# Patient Record
Sex: FEMALE | Race: WHITE | HISPANIC OR LATINO | Employment: OTHER | URBAN - METROPOLITAN AREA
[De-identification: names, ages, dates, MRNs, and addresses within clinical notes are randomized per-mention and may not be internally consistent; named-entity substitution may affect disease eponyms.]

---

## 2019-11-11 ENCOUNTER — HOSPITAL ENCOUNTER (OUTPATIENT)
Dept: RADIOLOGY | Facility: MEDICAL CENTER | Age: 34
End: 2019-11-11
Attending: SPECIALIST
Payer: COMMERCIAL

## 2019-11-11 DIAGNOSIS — N94.4 PRIMARY DYSMENORRHEA: ICD-10-CM

## 2019-11-11 DIAGNOSIS — N94.10 DYSPAREUNIA IN FEMALE: ICD-10-CM

## 2019-11-11 DIAGNOSIS — R10.2 PELVIC PAIN IN FEMALE: ICD-10-CM

## 2019-11-11 PROCEDURE — 76830 TRANSVAGINAL US NON-OB: CPT

## 2019-12-03 DIAGNOSIS — Z01.812 PRE-OPERATIVE LABORATORY EXAMINATION: ICD-10-CM

## 2019-12-03 LAB
ANION GAP SERPL CALC-SCNC: 7 MMOL/L (ref 0–11.9)
BUN SERPL-MCNC: 8 MG/DL (ref 8–22)
CALCIUM SERPL-MCNC: 9.6 MG/DL (ref 8.5–10.5)
CHLORIDE SERPL-SCNC: 104 MMOL/L (ref 96–112)
CO2 SERPL-SCNC: 26 MMOL/L (ref 20–33)
CREAT SERPL-MCNC: 0.64 MG/DL (ref 0.5–1.4)
ERYTHROCYTE [DISTWIDTH] IN BLOOD BY AUTOMATED COUNT: 43.8 FL (ref 35.9–50)
GLUCOSE SERPL-MCNC: 85 MG/DL (ref 65–99)
HCG SERPL QL: NEGATIVE
HCT VFR BLD AUTO: 43.7 % (ref 37–47)
HGB BLD-MCNC: 14.3 G/DL (ref 12–16)
MCH RBC QN AUTO: 31 PG (ref 27–33)
MCHC RBC AUTO-ENTMCNC: 32.7 G/DL (ref 33.6–35)
MCV RBC AUTO: 94.6 FL (ref 81.4–97.8)
PLATELET # BLD AUTO: 235 K/UL (ref 164–446)
PMV BLD AUTO: 9.6 FL (ref 9–12.9)
POTASSIUM SERPL-SCNC: 4.1 MMOL/L (ref 3.6–5.5)
RBC # BLD AUTO: 4.62 M/UL (ref 4.2–5.4)
SODIUM SERPL-SCNC: 137 MMOL/L (ref 135–145)
WBC # BLD AUTO: 8.8 K/UL (ref 4.8–10.8)

## 2019-12-03 PROCEDURE — 85027 COMPLETE CBC AUTOMATED: CPT

## 2019-12-03 PROCEDURE — 84703 CHORIONIC GONADOTROPIN ASSAY: CPT

## 2019-12-03 PROCEDURE — 80048 BASIC METABOLIC PNL TOTAL CA: CPT

## 2019-12-03 PROCEDURE — 36415 COLL VENOUS BLD VENIPUNCTURE: CPT

## 2019-12-03 RX ORDER — FLUTICASONE PROPIONATE 50 MCG
1 SPRAY, SUSPENSION (ML) NASAL DAILY
COMMUNITY

## 2019-12-03 RX ORDER — FEXOFENADINE HCL 180 MG/1
180 TABLET ORAL ONCE
COMMUNITY

## 2019-12-03 RX ORDER — DIPHENHYDRAMINE HCL 25 MG
25 TABLET ORAL EVERY 6 HOURS PRN
Status: ON HOLD | COMMUNITY
End: 2019-12-05

## 2019-12-05 ENCOUNTER — HOSPITAL ENCOUNTER (OUTPATIENT)
Facility: MEDICAL CENTER | Age: 34
End: 2019-12-05
Attending: SPECIALIST | Admitting: SPECIALIST
Payer: COMMERCIAL

## 2019-12-05 ENCOUNTER — ANESTHESIA (OUTPATIENT)
Dept: SURGERY | Facility: MEDICAL CENTER | Age: 34
End: 2019-12-05
Payer: COMMERCIAL

## 2019-12-05 ENCOUNTER — ANESTHESIA EVENT (OUTPATIENT)
Dept: SURGERY | Facility: MEDICAL CENTER | Age: 34
End: 2019-12-05
Payer: COMMERCIAL

## 2019-12-05 VITALS
WEIGHT: 127.43 LBS | TEMPERATURE: 97.1 F | BODY MASS INDEX: 25.69 KG/M2 | OXYGEN SATURATION: 96 % | DIASTOLIC BLOOD PRESSURE: 63 MMHG | HEART RATE: 77 BPM | HEIGHT: 59 IN | RESPIRATION RATE: 16 BRPM | SYSTOLIC BLOOD PRESSURE: 103 MMHG

## 2019-12-05 PROBLEM — J45.909 ASTHMA: Status: ACTIVE | Noted: 2019-12-05

## 2019-12-05 LAB
HCG UR QL: NEGATIVE
PATHOLOGY CONSULT NOTE: NORMAL
SP GR UR REFRACTOMETRY: >=1.03

## 2019-12-05 PROCEDURE — 501572 HCHG TROCAR, SHIELD OBTU 5X100: Performed by: SPECIALIST

## 2019-12-05 PROCEDURE — 160047 HCHG PACU  - EA ADDL 30 MINS PHASE II: Performed by: SPECIALIST

## 2019-12-05 PROCEDURE — A9270 NON-COVERED ITEM OR SERVICE: HCPCS | Performed by: ANESTHESIOLOGY

## 2019-12-05 PROCEDURE — 700111 HCHG RX REV CODE 636 W/ 250 OVERRIDE (IP)

## 2019-12-05 PROCEDURE — 700101 HCHG RX REV CODE 250: Performed by: SPECIALIST

## 2019-12-05 PROCEDURE — 700102 HCHG RX REV CODE 250 W/ 637 OVERRIDE(OP): Performed by: ANESTHESIOLOGY

## 2019-12-05 PROCEDURE — 81025 URINE PREGNANCY TEST: CPT

## 2019-12-05 PROCEDURE — 160025 RECOVERY II MINUTES (STATS): Performed by: SPECIALIST

## 2019-12-05 PROCEDURE — 160002 HCHG RECOVERY MINUTES (STAT): Performed by: SPECIALIST

## 2019-12-05 PROCEDURE — 160041 HCHG SURGERY MINUTES - EA ADDL 1 MIN LEVEL 4: Performed by: SPECIALIST

## 2019-12-05 PROCEDURE — 700105 HCHG RX REV CODE 258: Performed by: ANESTHESIOLOGY

## 2019-12-05 PROCEDURE — 700111 HCHG RX REV CODE 636 W/ 250 OVERRIDE (IP): Performed by: ANESTHESIOLOGY

## 2019-12-05 PROCEDURE — 700101 HCHG RX REV CODE 250: Performed by: ANESTHESIOLOGY

## 2019-12-05 PROCEDURE — 160046 HCHG PACU - 1ST 60 MINS PHASE II: Performed by: SPECIALIST

## 2019-12-05 PROCEDURE — 160036 HCHG PACU - EA ADDL 30 MINS PHASE I: Performed by: SPECIALIST

## 2019-12-05 PROCEDURE — 500854 HCHG NEEDLE, INSUFFLATION FOR STEP: Performed by: SPECIALIST

## 2019-12-05 PROCEDURE — 160035 HCHG PACU - 1ST 60 MINS PHASE I: Performed by: SPECIALIST

## 2019-12-05 PROCEDURE — 501582 HCHG TROCAR, THRD BLADED: Performed by: SPECIALIST

## 2019-12-05 PROCEDURE — 501838 HCHG SUTURE GENERAL: Performed by: SPECIALIST

## 2019-12-05 PROCEDURE — 88302 TISSUE EXAM BY PATHOLOGIST: CPT

## 2019-12-05 PROCEDURE — 700105 HCHG RX REV CODE 258: Performed by: SPECIALIST

## 2019-12-05 PROCEDURE — 160009 HCHG ANES TIME/MIN: Performed by: SPECIALIST

## 2019-12-05 PROCEDURE — A6402 STERILE GAUZE <= 16 SQ IN: HCPCS | Performed by: SPECIALIST

## 2019-12-05 PROCEDURE — 160048 HCHG OR STATISTICAL LEVEL 1-5: Performed by: SPECIALIST

## 2019-12-05 PROCEDURE — 500886 HCHG PACK, LAPAROSCOPY: Performed by: SPECIALIST

## 2019-12-05 PROCEDURE — 160029 HCHG SURGERY MINUTES - 1ST 30 MINS LEVEL 4: Performed by: SPECIALIST

## 2019-12-05 RX ORDER — DIPHENHYDRAMINE HYDROCHLORIDE 50 MG/ML
12.5 INJECTION INTRAMUSCULAR; INTRAVENOUS
Status: DISCONTINUED | OUTPATIENT
Start: 2019-12-05 | End: 2019-12-05 | Stop reason: HOSPADM

## 2019-12-05 RX ORDER — LIDOCAINE HYDROCHLORIDE 20 MG/ML
INJECTION, SOLUTION EPIDURAL; INFILTRATION; INTRACAUDAL; PERINEURAL PRN
Status: DISCONTINUED | OUTPATIENT
Start: 2019-12-05 | End: 2019-12-05 | Stop reason: SURG

## 2019-12-05 RX ORDER — KETOROLAC TROMETHAMINE 30 MG/ML
INJECTION, SOLUTION INTRAMUSCULAR; INTRAVENOUS PRN
Status: DISCONTINUED | OUTPATIENT
Start: 2019-12-05 | End: 2019-12-05 | Stop reason: SURG

## 2019-12-05 RX ORDER — HYDRALAZINE HYDROCHLORIDE 20 MG/ML
5 INJECTION INTRAMUSCULAR; INTRAVENOUS
Status: DISCONTINUED | OUTPATIENT
Start: 2019-12-05 | End: 2019-12-05 | Stop reason: HOSPADM

## 2019-12-05 RX ORDER — ONDANSETRON 2 MG/ML
4 INJECTION INTRAMUSCULAR; INTRAVENOUS ONCE
Status: DISCONTINUED | OUTPATIENT
Start: 2019-12-05 | End: 2019-12-05 | Stop reason: HOSPADM

## 2019-12-05 RX ORDER — DEXAMETHASONE SODIUM PHOSPHATE 4 MG/ML
INJECTION, SOLUTION INTRA-ARTICULAR; INTRALESIONAL; INTRAMUSCULAR; INTRAVENOUS; SOFT TISSUE PRN
Status: DISCONTINUED | OUTPATIENT
Start: 2019-12-05 | End: 2019-12-05 | Stop reason: SURG

## 2019-12-05 RX ORDER — BUPIVACAINE HYDROCHLORIDE AND EPINEPHRINE 2.5; 5 MG/ML; UG/ML
INJECTION, SOLUTION EPIDURAL; INFILTRATION; INTRACAUDAL; PERINEURAL
Status: DISCONTINUED | OUTPATIENT
Start: 2019-12-05 | End: 2019-12-05 | Stop reason: HOSPADM

## 2019-12-05 RX ORDER — CEFOTETAN DISODIUM 2 G/20ML
INJECTION, POWDER, FOR SOLUTION INTRAMUSCULAR; INTRAVENOUS PRN
Status: DISCONTINUED | OUTPATIENT
Start: 2019-12-05 | End: 2019-12-05 | Stop reason: SURG

## 2019-12-05 RX ORDER — PROMETHAZINE HYDROCHLORIDE 25 MG/1
12.5 SUPPOSITORY RECTAL EVERY 4 HOURS PRN
Status: DISCONTINUED | OUTPATIENT
Start: 2019-12-05 | End: 2019-12-05 | Stop reason: HOSPADM

## 2019-12-05 RX ORDER — ROCURONIUM BROMIDE 10 MG/ML
INJECTION, SOLUTION INTRAVENOUS PRN
Status: DISCONTINUED | OUTPATIENT
Start: 2019-12-05 | End: 2019-12-05 | Stop reason: SURG

## 2019-12-05 RX ORDER — LIDOCAINE HYDROCHLORIDE 10 MG/ML
INJECTION, SOLUTION EPIDURAL; INFILTRATION; INTRACAUDAL; PERINEURAL
Status: COMPLETED
Start: 2019-12-05 | End: 2019-12-05

## 2019-12-05 RX ORDER — MEPERIDINE HYDROCHLORIDE 25 MG/ML
12.5 INJECTION INTRAMUSCULAR; INTRAVENOUS; SUBCUTANEOUS
Status: DISCONTINUED | OUTPATIENT
Start: 2019-12-05 | End: 2019-12-05 | Stop reason: HOSPADM

## 2019-12-05 RX ORDER — ACETAMINOPHEN 500 MG
1000 TABLET ORAL ONCE
Status: COMPLETED | OUTPATIENT
Start: 2019-12-05 | End: 2019-12-05

## 2019-12-05 RX ORDER — OXYCODONE HCL 5 MG/5 ML
10 SOLUTION, ORAL ORAL
Status: COMPLETED | OUTPATIENT
Start: 2019-12-05 | End: 2019-12-05

## 2019-12-05 RX ORDER — MIDAZOLAM HYDROCHLORIDE 1 MG/ML
INJECTION INTRAMUSCULAR; INTRAVENOUS PRN
Status: DISCONTINUED | OUTPATIENT
Start: 2019-12-05 | End: 2019-12-05 | Stop reason: SURG

## 2019-12-05 RX ORDER — MIDAZOLAM HYDROCHLORIDE 1 MG/ML
1 INJECTION INTRAMUSCULAR; INTRAVENOUS
Status: DISCONTINUED | OUTPATIENT
Start: 2019-12-05 | End: 2019-12-05 | Stop reason: HOSPADM

## 2019-12-05 RX ORDER — SODIUM CHLORIDE, SODIUM LACTATE, POTASSIUM CHLORIDE, CALCIUM CHLORIDE 600; 310; 30; 20 MG/100ML; MG/100ML; MG/100ML; MG/100ML
INJECTION, SOLUTION INTRAVENOUS
Status: DISCONTINUED | OUTPATIENT
Start: 2019-12-05 | End: 2019-12-05 | Stop reason: SURG

## 2019-12-05 RX ORDER — ONDANSETRON 2 MG/ML
4 INJECTION INTRAMUSCULAR; INTRAVENOUS
Status: COMPLETED | OUTPATIENT
Start: 2019-12-05 | End: 2019-12-05

## 2019-12-05 RX ORDER — HYDROMORPHONE HYDROCHLORIDE 1 MG/ML
0.4 INJECTION, SOLUTION INTRAMUSCULAR; INTRAVENOUS; SUBCUTANEOUS
Status: DISCONTINUED | OUTPATIENT
Start: 2019-12-05 | End: 2019-12-05 | Stop reason: HOSPADM

## 2019-12-05 RX ORDER — ACETAMINOPHEN 500 MG
1000 TABLET ORAL
COMMUNITY
End: 2022-05-19

## 2019-12-05 RX ORDER — HYDROMORPHONE HYDROCHLORIDE 1 MG/ML
0.1 INJECTION, SOLUTION INTRAMUSCULAR; INTRAVENOUS; SUBCUTANEOUS
Status: DISCONTINUED | OUTPATIENT
Start: 2019-12-05 | End: 2019-12-05 | Stop reason: HOSPADM

## 2019-12-05 RX ORDER — PHENYLEPHRINE HCL IN 0.9% NACL 0.5 MG/5ML
SYRINGE (ML) INTRAVENOUS PRN
Status: DISCONTINUED | OUTPATIENT
Start: 2019-12-05 | End: 2019-12-05 | Stop reason: SURG

## 2019-12-05 RX ORDER — HALOPERIDOL 5 MG/ML
1 INJECTION INTRAMUSCULAR
Status: DISCONTINUED | OUTPATIENT
Start: 2019-12-05 | End: 2019-12-05 | Stop reason: HOSPADM

## 2019-12-05 RX ORDER — OXYCODONE HCL 5 MG/5 ML
5 SOLUTION, ORAL ORAL
Status: COMPLETED | OUTPATIENT
Start: 2019-12-05 | End: 2019-12-05

## 2019-12-05 RX ORDER — LABETALOL HYDROCHLORIDE 5 MG/ML
5 INJECTION, SOLUTION INTRAVENOUS
Status: DISCONTINUED | OUTPATIENT
Start: 2019-12-05 | End: 2019-12-05 | Stop reason: HOSPADM

## 2019-12-05 RX ORDER — SODIUM CHLORIDE, SODIUM LACTATE, POTASSIUM CHLORIDE, CALCIUM CHLORIDE 600; 310; 30; 20 MG/100ML; MG/100ML; MG/100ML; MG/100ML
INJECTION, SOLUTION INTRAVENOUS CONTINUOUS
Status: DISCONTINUED | OUTPATIENT
Start: 2019-12-05 | End: 2019-12-05 | Stop reason: HOSPADM

## 2019-12-05 RX ORDER — HYDROMORPHONE HYDROCHLORIDE 1 MG/ML
0.2 INJECTION, SOLUTION INTRAMUSCULAR; INTRAVENOUS; SUBCUTANEOUS
Status: DISCONTINUED | OUTPATIENT
Start: 2019-12-05 | End: 2019-12-05 | Stop reason: HOSPADM

## 2019-12-05 RX ORDER — ONDANSETRON 2 MG/ML
INJECTION INTRAMUSCULAR; INTRAVENOUS PRN
Status: DISCONTINUED | OUTPATIENT
Start: 2019-12-05 | End: 2019-12-05 | Stop reason: SURG

## 2019-12-05 RX ORDER — GABAPENTIN 300 MG/1
600 CAPSULE ORAL ONCE
Status: COMPLETED | OUTPATIENT
Start: 2019-12-05 | End: 2019-12-05

## 2019-12-05 RX ADMIN — OXYCODONE HYDROCHLORIDE 10 MG: 5 SOLUTION ORAL at 16:15

## 2019-12-05 RX ADMIN — MIDAZOLAM 2 MG: 1 INJECTION INTRAMUSCULAR; INTRAVENOUS at 15:03

## 2019-12-05 RX ADMIN — ONDANSETRON 4 MG: 2 INJECTION INTRAMUSCULAR; INTRAVENOUS at 16:13

## 2019-12-05 RX ADMIN — SODIUM CHLORIDE, POTASSIUM CHLORIDE, SODIUM LACTATE AND CALCIUM CHLORIDE: 600; 310; 30; 20 INJECTION, SOLUTION INTRAVENOUS at 16:20

## 2019-12-05 RX ADMIN — DEXAMETHASONE SODIUM PHOSPHATE 8 MG: 4 INJECTION, SOLUTION INTRA-ARTICULAR; INTRALESIONAL; INTRAMUSCULAR; INTRAVENOUS; SOFT TISSUE at 15:07

## 2019-12-05 RX ADMIN — GABAPENTIN 600 MG: 300 CAPSULE ORAL at 14:16

## 2019-12-05 RX ADMIN — ROCURONIUM BROMIDE 50 MG: 10 INJECTION, SOLUTION INTRAVENOUS at 15:07

## 2019-12-05 RX ADMIN — SUGAMMADEX 200 MG: 100 INJECTION, SOLUTION INTRAVENOUS at 15:45

## 2019-12-05 RX ADMIN — PROPOFOL 150 MG: 10 INJECTION, EMULSION INTRAVENOUS at 15:07

## 2019-12-05 RX ADMIN — Medication 0.5 ML: at 14:12

## 2019-12-05 RX ADMIN — KETOROLAC TROMETHAMINE 30 MG: 30 INJECTION, SOLUTION INTRAMUSCULAR at 15:14

## 2019-12-05 RX ADMIN — FENTANYL CITRATE 25 MCG: 0.05 INJECTION, SOLUTION INTRAMUSCULAR; INTRAVENOUS at 17:30

## 2019-12-05 RX ADMIN — CEFOTETAN DISODIUM 2 G: 2 INJECTION, POWDER, FOR SOLUTION INTRAMUSCULAR; INTRAVENOUS at 15:07

## 2019-12-05 RX ADMIN — FENTANYL CITRATE 25 MCG: 0.05 INJECTION, SOLUTION INTRAMUSCULAR; INTRAVENOUS at 16:48

## 2019-12-05 RX ADMIN — LIDOCAINE HYDROCHLORIDE 60 MG: 20 INJECTION, SOLUTION EPIDURAL; INFILTRATION; INTRACAUDAL at 15:07

## 2019-12-05 RX ADMIN — LIDOCAINE HYDROCHLORIDE 0.5 ML: 10 INJECTION, SOLUTION EPIDURAL; INFILTRATION; INTRACAUDAL at 14:12

## 2019-12-05 RX ADMIN — ACETAMINOPHEN 1000 MG: 500 TABLET ORAL at 14:16

## 2019-12-05 RX ADMIN — FENTANYL CITRATE 100 MCG: 50 INJECTION, SOLUTION INTRAMUSCULAR; INTRAVENOUS at 15:12

## 2019-12-05 RX ADMIN — FENTANYL CITRATE 100 MCG: 50 INJECTION, SOLUTION INTRAMUSCULAR; INTRAVENOUS at 15:07

## 2019-12-05 RX ADMIN — SODIUM CHLORIDE, POTASSIUM CHLORIDE, SODIUM LACTATE AND CALCIUM CHLORIDE: 600; 310; 30; 20 INJECTION, SOLUTION INTRAVENOUS at 15:03

## 2019-12-05 RX ADMIN — PROPOFOL 50 MG: 10 INJECTION, EMULSION INTRAVENOUS at 15:12

## 2019-12-05 RX ADMIN — ONDANSETRON 4 MG: 2 INJECTION INTRAMUSCULAR; INTRAVENOUS at 15:40

## 2019-12-05 RX ADMIN — SODIUM CHLORIDE, POTASSIUM CHLORIDE, SODIUM LACTATE AND CALCIUM CHLORIDE: 600; 310; 30; 20 INJECTION, SOLUTION INTRAVENOUS at 14:14

## 2019-12-05 RX ADMIN — Medication 80 MCG: at 15:15

## 2019-12-05 ASSESSMENT — PAIN SCALES - GENERAL: PAIN_LEVEL: 2

## 2019-12-05 NOTE — OR NURSING
Assume care for pt in pre-op. Patient allergies and NPO status verified. Belongings secured. Patient verbalizes understanding of pain scale, expected course of stay and plan of care. Surgical site verified with patient. IV access established. Call light within reach. No further needs at this time. Hourly rounding in place.

## 2019-12-05 NOTE — ANESTHESIA PROCEDURE NOTES
Airway  Date/Time: 12/5/2019 3:08 PM  Performed by: Henrry Bender D.O.  Authorized by: Henrry Bender D.O.     Location:  OR  Urgency:  Elective  Indications for Airway Management:  Anesthesia  Spontaneous Ventilation: absent    Sedation Level:  Deep  Preoxygenated: Yes    Patient Position:  Sniffing  Mask Difficulty Assessment:  1 - vent by mask  Final Airway Type:  Endotracheal airway  Final Endotracheal Airway:  ETT  Cuffed: Yes    Technique Used for Successful ETT Placement:  Direct laryngoscopy  Insertion Site:  Oral  Blade Type:  Carla  Laryngoscope Blade/Videolaryngoscope Blade Size:  3  ETT Size (mm):  7.0  Measured from:  Teeth  ETT to Teeth (cm):  22  Placement Verified by: auscultation and capnometry    Cormack-Lehane Classification:  Grade I - full view of glottis  Number of Attempts at Approach:  1

## 2019-12-05 NOTE — OR SURGEON
Immediate Post OP Note    PreOp Diagnosis: Desire for permanent sterilization,    PostOp Diagnosis: Same, normal pelvis    Procedure(s):  PELVISCOPY - Wound Class: Clean Contaminated  Bilateral SALPINGECTOMY - Wound Class: Clean Contaminated  REMOVAL, INTRAUTERINE DEVICE - Wound Class: Clean Contaminated    Surgeon(s):  Celso Burnett M.D.    Anesthesiologist/Type of Anesthesia:  Anesthesiologist: Henrry Bender D.O./General    Surgical Staff:  Circulator: Alejandrina Dalton R.N.  Scrub Person: Summer Trent [place]: Marco Antonio Elizabeth R.N.    Specimens removed if any:  ID Type Source Tests Collected by Time Destination   A : Bilateral fallopian tubes Tissue Fallopian Tube PATHOLOGY SPECIMEN Celso Burnett M.D. 12/5/2019 1540        Estimated Blood Loss: minimal    Findings: normal uterus,  tubes and ovaries.  Peritoneal services clean    Complications: none    To RR stable, note dictated        12/5/2019 3:51 PM Celso Burnett M.D.

## 2019-12-05 NOTE — ANESTHESIA PREPROCEDURE EVALUATION
Relevant Problems   PULMONARY   (+) Asthma       Physical Exam    Airway   Mallampati: II  TM distance: >3 FB  Neck ROM: full       Cardiovascular - normal exam  Rhythm: regular  Rate: normal  (-) murmur     Dental - normal exam         Pulmonary - normal exam  Breath sounds clear to auscultation     Abdominal    Neurological - normal exam                 Anesthesia Plan    ASA 2       Plan - general       Airway plan will be ETT        Induction: intravenous    Postoperative Plan: Postoperative administration of opioids is intended.    Pertinent diagnostic labs and testing reviewed    Informed Consent:    Anesthetic plan and risks discussed with patient.    Use of blood products discussed with: patient whom consented to blood products.

## 2019-12-06 NOTE — ANESTHESIA TIME REPORT
Anesthesia Start and Stop Event Times     Date Time Event    12/5/2019 1456 Ready for Procedure     1503 Anesthesia Start     1603 Anesthesia Stop        Responsible Staff  12/05/19    Name Role Begin End    Henrry Bender D.O. Anesth 1503 1603        Preop Diagnosis (Free Text):  Pre-op Diagnosis     ENCOUNTER FOR STERILIZATION, DYSPAREUNIA DEEP, ENDOMETRIOSIS, PELVIC PAIN        Preop Diagnosis (Codes):    Post op Diagnosis  Endometriosis      Premium Reason  A. 3PM - 7AM    Comments:

## 2019-12-06 NOTE — DISCHARGE INSTRUCTIONS
ACTIVITY: Rest and take it easy for the first 24 hours.  A responsible adult is recommended to remain with you during that time.  It is normal to feel sleepy.  We encourage you to not do anything that requires balance, judgment or coordination.    MILD FLU-LIKE SYMPTOMS ARE NORMAL. YOU MAY EXPERIENCE GENERALIZED MUSCLE ACHES, THROAT IRRITATION, HEADACHE AND/OR SOME NAUSEA.    FOR 24 HOURS DO NOT:  Drive, operate machinery or run household appliances.  Drink beer or alcoholic beverages.   Make important decisions or sign legal documents.    SPECIAL INSTRUCTIONS: Follow any instructions from Dr Burnett    DIET: To avoid nausea, slowly advance diet as tolerated, avoiding spicy or greasy foods for the first day.  Add more substantial food to your diet according to your physician's instructions. INCREASE FLUIDS AND FIBER TO AVOID CONSTIPATION.    SURGICAL DRESSING/BATHING: no bathing, hot tubs or swimming pools until OK by your MD.    FOLLOW-UP APPOINTMENT:  A follow-up appointment should be arranged with your doctor in 1-2 weeks; call to schedule.    You should CALL YOUR PHYSICIAN if you develop:  Fever greater than 101 degrees F.  Pain not relieved by medication, or persistent nausea or vomiting.  Excessive bleeding (blood soaking through dressing) or unexpected drainage from the wound.  Extreme redness or swelling around the incision site, drainage of pus or foul smelling drainage.  Inability to urinate or empty your bladder within 8 hours.  Problems with breathing or chest pain.    You should call 911 if you develop problems with breathing or chest pain.  If you are unable to contact your doctor or surgical center, you should go to the nearest emergency room or urgent care center.    Physician's telephone #: [phone]    If any questions arise, call your doctor.  If your doctor is not available, please feel free to call the Surgical Center at [phone].  The Center is open Monday through Friday from 7AM  to 7PM.  You can also call the HEALTH HOTLINE open 24 hours/day, 7 days/week and speak to a nurse at [phone], or toll free at [phone].    A registered nurse may call you a few days after your surgery to see how you are doing after your procedure.    MEDICATIONS: Resume taking daily medication.  Take prescribed pain medication with food.  If no medication is prescribed, you may take non-aspirin pain medication if needed.  PAIN MEDICATION CAN BE VERY CONSTIPATING.  Take a stool softener or laxative such as senokot, pericolace, or milk of magnesia if needed.    Prescription given for Prescriptions at home.  Last pain medication given at Oxycodone 10mg at 4:15pm today..    If your physician has prescribed pain medication that includes Acetaminophen (Tylenol), do not take additional Acetaminophen (Tylenol) while taking the prescribed medication.    Depression / Suicide Risk    As you are discharged from this [facility] facility, it is important to learn how to keep safe from harming yourself.    Recognize the warning signs:  · Abrupt changes in personality, positive or negative- including increase in energy   · Giving away possessions  · Change in eating patterns- significant weight changes-  positive or negative  · Change in sleeping patterns- unable to sleep or sleeping all the time   · Unwillingness or inability to communicate  · Depression  · Unusual sadness, discouragement and loneliness  · Talk of wanting to die  · Neglect of personal appearance   · Rebelliousness- reckless behavior  · Withdrawal from people/activities they love  · Confusion- inability to concentrate     If you or a loved one observes any of these behaviors or has concerns about self-harm, here's what you can do:  · Talk about it- your feelings and reasons for harming yourself  · Remove any means that you might use to hurt yourself (examples: pills, rope, extension cords, firearm)  · Get professional help from the community (Mental  Health, Substance Abuse, psychological counseling)  · Do not be alone:Call your Safe Contact- someone whom you trust who will be there for you.  · Call your local CRISIS HOTLINE 900-2122 or 045-157-7138  · Call your local Children's Mobile Crisis Response Team [place] (550) 921-0529 or www.[facility].com  · Call the toll free National Suicide Prevention Hotlines   · National Suicide Prevention Lifeline 721-743-XOGU (3585)  · National Hope Line Network 800-SUICIDE (876-4782)

## 2019-12-06 NOTE — OR NURSING
Report called to Viri KIM. All pertinent events, medical information and care plan details reported to receiving RN. Reviewed hemodynamics, allergies, code status, applicable medications including pain and nausea medicine given, and pertinent assessment findings including focused lower abdominal surgical site assessment. Surgical site reviewed at bedside with receiving RN. All questions and concerns addressed. Patient remains HDS on RA, AOx4 at this time. Pt tolerating PO liquids. Patient educated on next phase of care.This RN transferred patient to phase 2, assisted pt to ambulate, sit in chair with receiving RN.

## 2019-12-06 NOTE — OR NURSING
Patient's spouse, Santos, called per patient request. Patient's family updated via telephone number listed on chart. Patient's family updated on current POC and patient status. All questions and concerns addressed.

## 2019-12-06 NOTE — OR NURSING
VSS, pt steady with ambulation, meets discharge criteria. Discharged home with family. Wheeled to car with hospital escort. Rx given to pt as written by physician. IV dc'd, cathlon intact. Pt to f/u with physician as directed by physician. Pt to return to ER for any emergent sx. Pt verbalizes understanding of discharge instructions and all questions were answered.

## 2019-12-06 NOTE — OP REPORT
DATE OF SERVICE:  12/05/2019    PREOPERATIVE DIAGNOSES:  Desire for permanent sterilization and history of   endometriosis.    POSTOPERATIVE DIAGNOSES:  Desire for permanent sterilization and history of   endometriosis with normal pelvis.    PROCEDURE:  Pelviscopy with bilateral salpingectomy and IUD removal.    SURGEON:  Celso Burnett MD    ANESTHESIA:  General endotracheal.    ANESTHESIOLOGIST:  Henrry Bender DO    FINDINGS:  Mobile anteverted, normal sized uterus.  IUD was in place and   removed.  Tubes and ovaries were mobile bilaterally.  The ovaries appeared   normal.  All of the peritoneal surfaces appeared clean.  There was no evidence   of any active endometriosis.  The liver edge was smooth.  Surface of the   bowel pattern was normal.    PROCEDURE IN DETAIL:  The patient was brought to the OR and after adequate   general endotracheal anesthesia, placed in the low lithotomy position with   knee high NATHANAEL hose and intermittent compression stockings in place.  Multiple   layers of Betadine prep were applied to the vagina, lower abdomen, thighs, and   perineum and then chlorhexidine to the abdomen and then the patient was   draped in a sterile fashion.  After exam under anesthesia, Ballesteros catheter was   sterilely inserted.  Weighted speculum placed and the uterus sounded to 8 cm   and a #8 Nataliya intrauterine manipulator was placed without difficulty.    Attention was paid up above where the umbilicus was infiltrated with 0.25%   Marcaine with epinephrine.  Elliptical skin incision was made and then a   Veress needle was inserted with initial insufflation pressure of 4 mmHg.  The   abdomen was insufflated up to 15 mmHg and then the 11 mm bladed trocar was   placed without difficulty.  The operative scope with the video camera attached   was used and then an incision was made in the suprapubic area and a 5 mm   trocar was placed under direct visualization and the Prestige grasper was used   to grasp the  distal end of the right tube.  It was elevated.  The mesosalpinx   identified, progressively cauterized and cut until it was freed up of the   tubouterine junction and then placed in the cul-de-sac.  Left tube was removed   in a similar fashion and both tubes were then brought out through the   umbilical trocar.  The pedicles created were recauterized and CO2 allowed to   deflate to confirm good hemostasis and then this portion of the procedure was   terminated.  Prior to placing the Nataliya, the IUD had been removed.  After CO2   was deflated, trocars were removed and the umbilical fascia reapproximated   with interrupted #0 Vicryl stitch and then 4-0 Monocryl subcuticular stitches   were placed to reapproximate the skin.  The Tegaderm dressings were placed.    Attention was paid down below where the Ballesteros catheter was removed.  The Nataliya   was removed.  There was good hemostasis.  Patient went to the recovery room in   good condition with sponge, needle, and instrument counts correct.       ____________________________________     MD PAO Sanz / CHAYO    DD:  12/05/2019 15:57:18  DT:  12/05/2019 19:04:48    D#:  [id number]  Job#:  [id number]

## 2019-12-06 NOTE — ANESTHESIA QCDR
2019 [facility] Clinical Data Registry (for Quality Improvement)     Postoperative nausea/vomiting risk protocol (Adult = 18 yrs and Pediatric 3-17 yrs)- (430 and 463)  General inhalation anesthetic (NOT TIVA) with PONV risk factors: Yes  Provision of anti-emetic therapy with at least 2 different classes of agents: Yes   Patient DID NOT receive anti-emetic therapy and reason is documented in Medical Record:  N/A    Multimodal Pain Management- (AQI59)  Patient undergoing Elective Surgery (i.e. Outpatient, or ASC, or Prescheduled Surgery prior to Hospital Admission): Yes  Use of Multimodal Pain Management, two or more drugs and/or interventions, NOT including systemic opioids: Yes   Exception: Documented allergy to multiple classes of analgesics:  N/A    PACU assessment of acute postoperative pain prior to Anesthesia Care End- Applies to Patients Age = 18- (ABG7)  Initial PACU pain score is which of the following: < 7/10  Patient unable to report pain score: N/A    Post-anesthetic transfer of care checklist/protocol to PACU/ICU- (426 and 427)  Upon conclusion of case, patient transferred to which of the following locations: PACU/Non-ICU  Use of transfer checklist/protocol: Yes  Exclusion: Service Performed in Patient Hospital Room (and thus did not require transfer): N/A    PACU Reintubation- (AQI31)  General anesthesia requiring endotracheal intubation (ETT) along with subsequent extubation in OR or PACU: Yes  Required reintubation in the PACU: No   Extubation was a planned trial documented in the medical record prior to removal of the original airway device:  N/A    Unplanned admission to ICU related to anesthesia service up through end of PACU care- (MD51)  Unplanned admission to ICU (not initially anticipated at anesthesia start time): No

## 2019-12-06 NOTE — ANESTHESIA POSTPROCEDURE EVALUATION
Patient: Tesha Mason    Procedure Summary     Date:  12/05/19 Room / Location:  [place] [id number] / SURGERY [facility]    Anesthesia Start:  1503 Anesthesia Stop:  1603    Procedures:       PELVISCOPY (Abdomen)      SALPINGECTOMY (Bilateral Abdomen)      REMOVAL, INTRAUTERINE DEVICE (Uterus) Diagnosis:  (ENCOUNTER FOR STERILIZATION, DYSPAREUNIA DEEP, ENDOMETRIOSIS, PELVIC PAIN)    Surgeon:  Celso Burnett M.D. Responsible Provider:  Henrry Bender D.O.    Anesthesia Type:  general ASA Status:  2          Final Anesthesia Type: general  Last vitals  BP   Blood Pressure: 103/63    Temp   36.2 °C (97.1 °F)    Pulse   Pulse: 77   Resp   16    SpO2   96 %      Anesthesia Post Evaluation    Patient location during evaluation: PACU  Patient participation: complete - patient participated  Level of consciousness: awake and alert  Pain score: 2    Airway patency: patent  Anesthetic complications: no  Cardiovascular status: hemodynamically stable  Respiratory status: acceptable  Hydration status: euvolemic    PONV: none           Nurse Pain Score: 2 (NPRS)

## 2019-12-06 NOTE — OR NURSING
Assumed patient care at 1800. Patient alert and oriented. See flow sheet for VS. Pain is rated 2/10. Dressings are clean, dry, and intact. Call light and personal belongings within reach.  Gurney in lowest position. Monitor alarms set appropriately.

## 2019-12-20 ENCOUNTER — HOSPITAL ENCOUNTER (OUTPATIENT)
Dept: LAB | Facility: MEDICAL CENTER | Age: 34
End: 2019-12-20
Attending: SPECIALIST
Payer: COMMERCIAL

## 2019-12-20 PROCEDURE — 87086 URINE CULTURE/COLONY COUNT: CPT

## 2019-12-23 LAB
BACTERIA UR CULT: NORMAL
SIGNIFICANT IND 70042: NORMAL
SITE SITE: NORMAL
SOURCE SOURCE: NORMAL

## 2020-02-21 ENCOUNTER — HOSPITAL ENCOUNTER (OUTPATIENT)
Dept: LAB | Facility: MEDICAL CENTER | Age: 35
End: 2020-02-21
Attending: INTERNAL MEDICINE
Payer: COMMERCIAL

## 2020-02-21 LAB
ALBUMIN SERPL BCP-MCNC: 5 G/DL (ref 3.2–4.9)
ALBUMIN/GLOB SERPL: 1.8 G/DL
ALP SERPL-CCNC: 51 U/L (ref 30–99)
ALT SERPL-CCNC: 14 U/L (ref 2–50)
ANION GAP SERPL CALC-SCNC: 5 MMOL/L (ref 0–11.9)
APPEARANCE UR: CLEAR
AST SERPL-CCNC: 18 U/L (ref 12–45)
BASOPHILS # BLD AUTO: 1 % (ref 0–1.8)
BASOPHILS # BLD: 0.07 K/UL (ref 0–0.12)
BILIRUB SERPL-MCNC: 0.8 MG/DL (ref 0.1–1.5)
BILIRUB UR QL STRIP.AUTO: NEGATIVE
BUN SERPL-MCNC: 9 MG/DL (ref 8–22)
CALCIUM SERPL-MCNC: 9.7 MG/DL (ref 8.5–10.5)
CHLORIDE SERPL-SCNC: 103 MMOL/L (ref 96–112)
CO2 SERPL-SCNC: 28 MMOL/L (ref 20–33)
COLOR UR: YELLOW
CREAT SERPL-MCNC: 0.91 MG/DL (ref 0.5–1.4)
EOSINOPHIL # BLD AUTO: 0.42 K/UL (ref 0–0.51)
EOSINOPHIL NFR BLD: 5.8 % (ref 0–6.9)
ERYTHROCYTE [DISTWIDTH] IN BLOOD BY AUTOMATED COUNT: 44 FL (ref 35.9–50)
GLOBULIN SER CALC-MCNC: 2.8 G/DL (ref 1.9–3.5)
GLUCOSE SERPL-MCNC: 89 MG/DL (ref 65–99)
GLUCOSE UR STRIP.AUTO-MCNC: NEGATIVE MG/DL
HCT VFR BLD AUTO: 45.9 % (ref 37–47)
HGB BLD-MCNC: 15.2 G/DL (ref 12–16)
IMM GRANULOCYTES # BLD AUTO: 0.02 K/UL (ref 0–0.11)
IMM GRANULOCYTES NFR BLD AUTO: 0.3 % (ref 0–0.9)
KETONES UR STRIP.AUTO-MCNC: NEGATIVE MG/DL
LEUKOCYTE ESTERASE UR QL STRIP.AUTO: NEGATIVE
LYMPHOCYTES # BLD AUTO: 2.73 K/UL (ref 1–4.8)
LYMPHOCYTES NFR BLD: 37.4 % (ref 22–41)
MCH RBC QN AUTO: 31.4 PG (ref 27–33)
MCHC RBC AUTO-ENTMCNC: 33.1 G/DL (ref 33.6–35)
MCV RBC AUTO: 94.8 FL (ref 81.4–97.8)
MICRO URNS: NORMAL
MONOCYTES # BLD AUTO: 0.46 K/UL (ref 0–0.85)
MONOCYTES NFR BLD AUTO: 6.3 % (ref 0–13.4)
NEUTROPHILS # BLD AUTO: 3.6 K/UL (ref 2–7.15)
NEUTROPHILS NFR BLD: 49.2 % (ref 44–72)
NITRITE UR QL STRIP.AUTO: NEGATIVE
NRBC # BLD AUTO: 0 K/UL
NRBC BLD-RTO: 0 /100 WBC
PH UR STRIP.AUTO: 6 [PH] (ref 5–8)
PLATELET # BLD AUTO: 301 K/UL (ref 164–446)
PMV BLD AUTO: 10.2 FL (ref 9–12.9)
POTASSIUM SERPL-SCNC: 4.3 MMOL/L (ref 3.6–5.5)
PROT SERPL-MCNC: 7.8 G/DL (ref 6–8.2)
PROT UR QL STRIP: NEGATIVE MG/DL
RBC # BLD AUTO: 4.84 M/UL (ref 4.2–5.4)
RBC UR QL AUTO: NEGATIVE
SODIUM SERPL-SCNC: 136 MMOL/L (ref 135–145)
SP GR UR REFRACTOMETRY: 1.02
UROBILINOGEN UR STRIP.AUTO-MCNC: 0.2 MG/DL
WBC # BLD AUTO: 7.3 K/UL (ref 4.8–10.8)

## 2020-02-21 PROCEDURE — 36415 COLL VENOUS BLD VENIPUNCTURE: CPT

## 2020-02-21 PROCEDURE — 81003 URINALYSIS AUTO W/O SCOPE: CPT

## 2020-02-21 PROCEDURE — 80053 COMPREHEN METABOLIC PANEL: CPT

## 2020-02-21 PROCEDURE — 85025 COMPLETE CBC W/AUTO DIFF WBC: CPT

## 2021-10-12 ENCOUNTER — APPOINTMENT (OUTPATIENT)
Dept: BEHAVIORAL HEALTH | Facility: PSYCHIATRIC FACILITY | Age: 36
End: 2021-10-12
Payer: COMMERCIAL

## 2021-10-26 ENCOUNTER — OFFICE VISIT (OUTPATIENT)
Dept: BEHAVIORAL HEALTH | Facility: PSYCHIATRIC FACILITY | Age: 36
End: 2021-10-26
Payer: COMMERCIAL

## 2021-10-26 DIAGNOSIS — F90.9 ATTENTION DEFICIT HYPERACTIVITY DISORDER (ADHD), UNSPECIFIED ADHD TYPE: ICD-10-CM

## 2021-10-26 DIAGNOSIS — F43.10 PTSD (POST-TRAUMATIC STRESS DISORDER): ICD-10-CM

## 2021-10-26 DIAGNOSIS — F41.1 GENERALIZED ANXIETY DISORDER: ICD-10-CM

## 2021-10-26 DIAGNOSIS — F90.2 ATTENTION DEFICIT HYPERACTIVITY DISORDER (ADHD), COMBINED TYPE: ICD-10-CM

## 2021-10-26 PROCEDURE — 99213 OFFICE O/P EST LOW 20 MIN: CPT | Mod: GE | Performed by: STUDENT IN AN ORGANIZED HEALTH CARE EDUCATION/TRAINING PROGRAM
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
## 2021-10-27 VITALS — WEIGHT: 118.8 LBS | BODY MASS INDEX: 23.99 KG/M2 | SYSTOLIC BLOOD PRESSURE: 122 MMHG | DIASTOLIC BLOOD PRESSURE: 78 MMHG

## 2021-10-27 PROBLEM — F90.9 ADHD: Status: ACTIVE | Noted: 2021-10-27

## 2021-10-27 PROBLEM — F43.10 PTSD (POST-TRAUMATIC STRESS DISORDER): Status: ACTIVE | Noted: 2021-10-27

## 2021-10-27 PROBLEM — F41.1 GENERALIZED ANXIETY DISORDER: Status: ACTIVE | Noted: 2021-10-27

## 2021-10-27 RX ORDER — METHYLPHENIDATE HYDROCHLORIDE 27 MG/1
27 TABLET ORAL EVERY MORNING
Qty: 30 TABLET | Refills: 0 | Status: SHIPPED | OUTPATIENT
Start: 2021-10-27 | End: 2021-11-26

## 2021-10-27 RX ORDER — ESCITALOPRAM OXALATE 5 MG/1
5 TABLET ORAL DAILY
Qty: 30 TABLET | Refills: 2 | Status: SHIPPED | OUTPATIENT
Start: 2021-10-27 | End: 2021-12-07 | Stop reason: DRUGHIGH

## 2021-10-27 ASSESSMENT — FIBROSIS 4 INDEX: FIB4 SCORE: 0.53

## 2021-12-07 ENCOUNTER — APPOINTMENT (OUTPATIENT)
Dept: BEHAVIORAL HEALTH | Facility: PSYCHIATRIC FACILITY | Age: 36
End: 2021-12-07
Payer: COMMERCIAL

## 2021-12-07 ENCOUNTER — TELEPHONE (OUTPATIENT)
Dept: BEHAVIORAL HEALTH | Facility: PSYCHIATRIC FACILITY | Age: 36
End: 2021-12-07

## 2021-12-07 DIAGNOSIS — F90.9 ATTENTION DEFICIT HYPERACTIVITY DISORDER (ADHD), UNSPECIFIED ADHD TYPE: ICD-10-CM

## 2021-12-07 DIAGNOSIS — F41.1 GENERALIZED ANXIETY DISORDER: ICD-10-CM

## 2021-12-07 RX ORDER — METHYLPHENIDATE HYDROCHLORIDE 27 MG/1
27 TABLET, EXTENDED RELEASE ORAL DAILY
Qty: 30 TABLET | Refills: 0 | Status: SHIPPED | OUTPATIENT
Start: 2021-12-07 | End: 2022-01-10 | Stop reason: SDUPTHER

## 2021-12-07 RX ORDER — ESCITALOPRAM OXALATE 10 MG/1
10 TABLET ORAL DAILY
Qty: 30 TABLET | Refills: 2 | Status: SHIPPED | OUTPATIENT
Start: 2021-12-07 | End: 2022-01-18

## 2021-12-07 NOTE — TELEPHONE ENCOUNTER
Writer contacted patient at listed telephone number due to clinic cancellation to ensure patient has adequate refills to get to next appointment.    Patient requested refill of methylphenidate ER 27mg PO daily as well as increasing escitalopram to 10mg daily as discussed at last visit as she states she has responded to this medication well without side effects at 5mg PO daily. Writer placed orders for these medications.    [place]  checked and patient has not received any prescriptions for controlled substances from other providers within last 90 days.

## 2022-01-10 DIAGNOSIS — F90.9 ATTENTION DEFICIT HYPERACTIVITY DISORDER (ADHD), UNSPECIFIED ADHD TYPE: ICD-10-CM

## 2022-01-11 RX ORDER — METHYLPHENIDATE HYDROCHLORIDE 27 MG/1
27 TABLET, EXTENDED RELEASE ORAL DAILY
Qty: 30 TABLET | Refills: 0 | Status: SHIPPED | OUTPATIENT
Start: 2022-01-11 | End: 2022-01-18 | Stop reason: SDUPTHER

## 2022-01-11 NOTE — TELEPHONE ENCOUNTER
Received request via: Patient    Was the patient seen in the last year in this department? Yes    Does the patient have an active prescription (recently filled or refills available) for medication(s) requested? No     Patient called needs refill for methylphenidate 27 MG. Next appt is 1/18.

## 2022-01-18 ENCOUNTER — OFFICE VISIT (OUTPATIENT)
Dept: BEHAVIORAL HEALTH | Facility: PSYCHIATRIC FACILITY | Age: 37
End: 2022-01-18
Payer: COMMERCIAL

## 2022-01-18 DIAGNOSIS — F41.1 GENERALIZED ANXIETY DISORDER: ICD-10-CM

## 2022-01-18 DIAGNOSIS — F90.9 ATTENTION DEFICIT HYPERACTIVITY DISORDER (ADHD), UNSPECIFIED ADHD TYPE: ICD-10-CM

## 2022-01-18 PROCEDURE — 99213 OFFICE O/P EST LOW 20 MIN: CPT | Mod: GE | Performed by: STUDENT IN AN ORGANIZED HEALTH CARE EDUCATION/TRAINING PROGRAM

## 2022-01-18 RX ORDER — ESCITALOPRAM OXALATE 10 MG/1
20 TABLET ORAL DAILY
Qty: 30 TABLET | Refills: 3 | Status: SHIPPED | OUTPATIENT
Start: 2022-01-18 | End: 2022-02-08

## 2022-01-18 RX ORDER — METHYLPHENIDATE HYDROCHLORIDE 27 MG/1
27 TABLET, EXTENDED RELEASE ORAL DAILY
Qty: 30 TABLET | Refills: 0 | Status: SHIPPED | OUTPATIENT
Start: 2022-01-18 | End: 2022-02-17
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
## 2022-02-05 DIAGNOSIS — F41.1 GENERALIZED ANXIETY DISORDER: ICD-10-CM

## 2022-02-08 RX ORDER — ESCITALOPRAM OXALATE 10 MG/1
TABLET ORAL
Qty: 180 TABLET | Refills: 1 | Status: SHIPPED | OUTPATIENT
Start: 2022-02-08 | End: 2022-02-22 | Stop reason: SDUPTHER

## 2022-02-22 ENCOUNTER — OFFICE VISIT (OUTPATIENT)
Dept: BEHAVIORAL HEALTH | Facility: PSYCHIATRIC FACILITY | Age: 37
End: 2022-02-22
Payer: COMMERCIAL

## 2022-02-22 VITALS — DIASTOLIC BLOOD PRESSURE: 81 MMHG | BODY MASS INDEX: 26.86 KG/M2 | WEIGHT: 133 LBS | SYSTOLIC BLOOD PRESSURE: 131 MMHG

## 2022-02-22 DIAGNOSIS — F41.1 GENERALIZED ANXIETY DISORDER: ICD-10-CM

## 2022-02-22 DIAGNOSIS — F90.2 ADHD (ATTENTION DEFICIT HYPERACTIVITY DISORDER), COMBINED TYPE: ICD-10-CM

## 2022-02-22 PROCEDURE — 99213 OFFICE O/P EST LOW 20 MIN: CPT | Mod: GE | Performed by: STUDENT IN AN ORGANIZED HEALTH CARE EDUCATION/TRAINING PROGRAM

## 2022-02-22 RX ORDER — ESCITALOPRAM OXALATE 10 MG/1
20 TABLET ORAL
Qty: 180 TABLET | Refills: 1 | Status: SHIPPED | OUTPATIENT
Start: 2022-02-22 | End: 2022-03-15 | Stop reason: SDUPTHER

## 2022-02-22 RX ORDER — METHYLPHENIDATE HYDROCHLORIDE 36 MG/1
36 TABLET ORAL EVERY MORNING
Qty: 14 TABLET | Refills: 0 | Status: SHIPPED | OUTPATIENT
Start: 2022-02-22 | End: 2022-03-08
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
## 2022-03-15 ENCOUNTER — OFFICE VISIT (OUTPATIENT)
Dept: BEHAVIORAL HEALTH | Facility: PSYCHIATRIC FACILITY | Age: 37
End: 2022-03-15
Payer: COMMERCIAL

## 2022-03-15 DIAGNOSIS — F90.2 ADHD (ATTENTION DEFICIT HYPERACTIVITY DISORDER), COMBINED TYPE: ICD-10-CM

## 2022-03-15 DIAGNOSIS — F41.1 GENERALIZED ANXIETY DISORDER: ICD-10-CM

## 2022-03-15 PROCEDURE — 99214 OFFICE O/P EST MOD 30 MIN: CPT | Performed by: STUDENT IN AN ORGANIZED HEALTH CARE EDUCATION/TRAINING PROGRAM

## 2022-03-15 RX ORDER — ESCITALOPRAM OXALATE 10 MG/1
20 TABLET ORAL
Qty: 180 TABLET | Refills: 3 | Status: SHIPPED | OUTPATIENT
Start: 2022-03-15 | End: 2022-03-25

## 2022-03-15 RX ORDER — METHYLPHENIDATE HYDROCHLORIDE 36 MG/1
36 TABLET, EXTENDED RELEASE ORAL DAILY
Qty: 30 TABLET | Refills: 0 | Status: SHIPPED | OUTPATIENT
Start: 2022-03-15 | End: 2022-04-14 | Stop reason: SDUPTHER
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
## 2022-03-24 DIAGNOSIS — F41.1 GENERALIZED ANXIETY DISORDER: ICD-10-CM

## 2022-03-25 RX ORDER — ESCITALOPRAM OXALATE 20 MG/1
20 TABLET ORAL DAILY
Qty: 30 TABLET | Refills: 3 | Status: SHIPPED | OUTPATIENT
Start: 2022-03-25 | End: 2022-04-20

## 2022-04-14 DIAGNOSIS — F90.2 ADHD (ATTENTION DEFICIT HYPERACTIVITY DISORDER), COMBINED TYPE: ICD-10-CM

## 2022-04-14 RX ORDER — METHYLPHENIDATE HYDROCHLORIDE 36 MG/1
36 TABLET, EXTENDED RELEASE ORAL DAILY
Qty: 30 TABLET | Refills: 0 | Status: SHIPPED | OUTPATIENT
Start: 2022-04-14 | End: 2022-05-03 | Stop reason: SDUPTHER

## 2022-04-14 NOTE — TELEPHONE ENCOUNTER
Received request via: Patient    Was the patient seen in the last year in this department? Yes    Does the patient have an active prescription (recently filled or refills available) for medication(s) requested? No     Patient called she needs refill for methylphenidate.Next appt is 5/3

## 2022-04-20 DIAGNOSIS — F41.1 GENERALIZED ANXIETY DISORDER: ICD-10-CM

## 2022-04-20 RX ORDER — ESCITALOPRAM OXALATE 20 MG/1
TABLET ORAL
Qty: 30 TABLET | Refills: 3 | Status: SHIPPED | OUTPATIENT
Start: 2022-04-20 | End: 2022-08-15 | Stop reason: SDUPTHER

## 2022-05-03 ENCOUNTER — OFFICE VISIT (OUTPATIENT)
Dept: BEHAVIORAL HEALTH | Facility: PSYCHIATRIC FACILITY | Age: 37
End: 2022-05-03
Payer: COMMERCIAL

## 2022-05-03 DIAGNOSIS — F90.2 ADHD (ATTENTION DEFICIT HYPERACTIVITY DISORDER), COMBINED TYPE: ICD-10-CM

## 2022-05-03 PROCEDURE — 99213 OFFICE O/P EST LOW 20 MIN: CPT | Mod: GE | Performed by: STUDENT IN AN ORGANIZED HEALTH CARE EDUCATION/TRAINING PROGRAM

## 2022-05-03 RX ORDER — METHYLPHENIDATE HYDROCHLORIDE 36 MG/1
36 TABLET, EXTENDED RELEASE ORAL DAILY
Qty: 30 TABLET | Refills: 0 | Status: SHIPPED | OUTPATIENT
Start: 2022-05-14 | End: 2022-06-13
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
## 2022-05-19 ENCOUNTER — OFFICE VISIT (OUTPATIENT)
Dept: NEUROLOGY | Facility: MEDICAL CENTER | Age: 37
End: 2022-05-19
Attending: PSYCHIATRY & NEUROLOGY
Payer: COMMERCIAL

## 2022-05-19 ENCOUNTER — TELEPHONE (OUTPATIENT)
Dept: NEUROLOGY | Facility: MEDICAL CENTER | Age: 37
End: 2022-05-19

## 2022-05-19 VITALS
OXYGEN SATURATION: 98 % | RESPIRATION RATE: 16 BRPM | HEIGHT: 59 IN | HEART RATE: 83 BPM | SYSTOLIC BLOOD PRESSURE: 112 MMHG | TEMPERATURE: 96.7 F | BODY MASS INDEX: 26.62 KG/M2 | DIASTOLIC BLOOD PRESSURE: 70 MMHG | WEIGHT: 132.06 LBS

## 2022-05-19 DIAGNOSIS — G43.719 INTRACTABLE CHRONIC MIGRAINE WITHOUT AURA AND WITHOUT STATUS MIGRAINOSUS: Primary | ICD-10-CM

## 2022-05-19 PROBLEM — F90.8 ATTENTION-DEFICIT HYPERACTIVITY DISORDER, OTHER TYPE: Status: ACTIVE | Noted: 2021-01-11

## 2022-05-19 PROBLEM — F43.10 POSTTRAUMATIC STRESS DISORDER: Status: ACTIVE | Noted: 2021-01-11

## 2022-05-19 PROBLEM — F41.1 GENERALIZED ANXIETY DISORDER: Status: ACTIVE | Noted: 2021-01-11

## 2022-05-19 PROCEDURE — 99212 OFFICE O/P EST SF 10 MIN: CPT | Performed by: PSYCHIATRY & NEUROLOGY

## 2022-05-19 PROCEDURE — 99204 OFFICE O/P NEW MOD 45 MIN: CPT | Performed by: PSYCHIATRY & NEUROLOGY

## 2022-05-19 RX ORDER — SUMATRIPTAN 50 MG/1
TABLET, FILM COATED ORAL
Qty: 9 TABLET | Refills: 5 | Status: SHIPPED | OUTPATIENT
Start: 2022-05-19 | End: 2022-06-18

## 2022-05-19 RX ORDER — ERENUMAB-AOOE 70 MG/ML
70 INJECTION SUBCUTANEOUS
Qty: 1 ML | Refills: 5 | Status: SHIPPED | OUTPATIENT
Start: 2022-05-19 | End: 2022-11-13

## 2022-05-19 RX ORDER — ONDANSETRON 4 MG/1
TABLET, FILM COATED ORAL
Qty: 20 TABLET | Refills: 5 | Status: SHIPPED | OUTPATIENT
Start: 2022-05-19 | End: 2022-06-18

## 2022-05-19 ASSESSMENT — PATIENT HEALTH QUESTIONNAIRE - PHQ9
CLINICAL INTERPRETATION OF PHQ2 SCORE: 2
SUM OF ALL RESPONSES TO PHQ QUESTIONS 1-9: 7
5. POOR APPETITE OR OVEREATING: 0 - NOT AT ALL

## 2022-05-19 ASSESSMENT — PAIN SCALES - GENERAL: PAINLEVEL: 2=MINIMAL-SLIGHT

## 2022-05-19 NOTE — TELEPHONE ENCOUNTER
Aimovig 70mg/ml SOAJ    PA submitted via CMM and approved caseId: [id number]; Status: Approved; Review Type: Prior Auth; Coverage Start Date: 04/19/2022; Coverage End Date: 05/19/2023, TC paid copay $100.00 #1ml/30 DS Max 30 DS notifying liaison Kinga Mcfadden. - 05/19/2022 11:49am

## 2022-05-19 NOTE — PROGRESS NOTES
"[facility] NEUROLOGY  GENERAL NEUROLOGY  NEW PATIENT VISIT    Referral source: Don Bauman MD    CC: \"headache, dizziness, giddiness\"    HISTORY OF ILLNESS:  Tesha Mason is a 36 y.o. woman with a history most notable for asthma, ADHD, JENN, and PTSD.  Today, she was accompanied by her , and she provided the following history:    The following is a summary of headache symptoms, presented in my standard format:    Family History: unknown (adopted)  Age at onset: childhood  Location: occipital  Radiation: bi-frontal  Frequency: 1-7 days/week  Duration: 3-5 hours  Headache Days/Month: 25/30  Quality: \"pressure, tension, throbbing\"  Intensity: 4-10/10  Aura: none  Photophobia/Phonophobia/Nausea/Vomiting: yes/yes/yes/no  Provoked by Physical Activity?: no  Triggers: anxiety  Associated Symptoms: \"vertigo\"  Autonomic Signs (such as ptosis, miosis, conjunctival injection, rhinorrhea, increased lacrimation): no  Head Trauma: yes  Association with Menses: no  ED Visits: yes  Hospitalizations: no  Missed Work Days (): yes  Sleep: 5-8 hours/night  Caffeine Intake: de-caf  Hydration: keeps well-hydrated  Nutrition: eats regular meals  Exercise:   Analgesic Overuse:     Current Medication Regimen:  - Excedrin: somewhat helpful    Medications Tried: Response  Preventive:  - venlafaxine: ineffective    Abortive:  - ibuprofen  - acetaminophen    Medications Not Tried:  - tricyclic antidepressants: will avoid given use of escitalopram  - propranolol: will avoid given history of asthma    MEDICAL AND SURGICAL HISTORY:  Past Medical History:   Diagnosis Date   • Asthma     prn inhaler   • Bronchitis     2009   • Head ache      Past Surgical History:   Procedure Laterality Date   • [place] LAP,DIAGNOSTIC ABDOMEN  12/5/2019    Procedure: PELVISCOPY;  Surgeon: Celso Burnett M.D.;  Location: SURGERY [facility];  Service: Gynecology   • [place] REMOVE INTRAUTERINE DEVICE  12/5/2019    Procedure: REMOVAL, " INTRAUTERINE DEVICE;  Surgeon: Celso Burnett M.D.;  Location: SURGERY [facility];  Service: Gynecology   • SALPINGECTOMY Bilateral 12/5/2019    Procedure: SALPINGECTOMY;  Surgeon: Celso Burnett M.D.;  Location: SURGERY [facility];  Service: Gynecology   • GYN SURGERY  2010, 2012    laparoscopy   • OTHER      tonsillectomy   • OTHER      wisdom teeth removal 2006     MEDICATIONS:  Current Outpatient Medications   Medication Sig   • Methylphenidate HCl ER 36 MG TABLET SR 24 HR Take 36 mg by mouth every day for 30 days.   • escitalopram (LEXAPRO) 20 MG tablet TAKE 1 TABLET BY MOUTH EVERY DAY   • fluticasone (FLONASE) 50 MCG/ACT nasal spray Spray 1 Spray in nose every day.   • asa/apap/caffeine (EXCEDRIN) 250-250-65 MG Tab Take 1 Tab by mouth every 6 hours as needed for Headache.   • NON SPECIFIED 1 Each by Injection route 2X A WEEK. Allergy shot 2x per week    • fexofenadine (ALLEGRA) 180 MG tablet Take 180 mg by mouth Once.     SOCIAL HISTORY:  Social History     Tobacco Use   • Smoking status: Never Smoker   • Smokeless tobacco: Never Used   Substance Use Topics   • Alcohol use: Yes     Comment: 2 drinks per week     Social History     Social History Narrative   • Not on file     FAMILY HISTORY:  History reviewed. No pertinent family history.     REVIEW OF SYSTEMS:  A ROS was completed.  Pertinent positives and negatives were included in the HPI, above.  All other systems were reviewed and are negative.    PHYSICAL EXAM:  General/Medical:  - NAD  - hair, skin, nails, and joints were normal    Neuro:  MENTAL STATUS: awake and alert; no deficits of speech or language; oriented to person, place, and time; affect was appropriate to situation; pleasant, cooperative    CRANIAL NERVES:    II: acuity: J1+/J1+, fields: intact to confrontation, pupils: 3/3 to 2/2 without a relative afferent pupillary defect, discs: sharp    III/IV/VI: versions: intact without nystagmus    V: facial sensation: symmetric to  "light touch    VII: facial expression: symmetric    VIII: hearing: intact to finger rub    IX/X: palate: elevates symmetrically    XI: shoulder shrug: symmetric    XII: tongue: midline    MOTOR:  - bulk: normal throughout  - tone: normal in the upper extremities  Upper Extremity Strength  (R/L)    5/5   Elbow flexion 5/5   Elbow extension 5/5   Shoulder abduction 5/5     Lower Extremity Strength  (R/L)   Hip flexion 5/5   Knee extension 5/5   Knee flexion 5/5   Ankle plantarflexion 5/5   Ankle dorsiflexion 5/5     - pronator drift: absent  - abnormal movements: none    SENSATION:  - light touch: grossly intact over the upper and lower extremities  - vibration (R/L, seconds): NT/NT at the great toes  - pinprick: NT  - proprioception: NT  - Romberg: absent    COORDINATION:  - finger to nose: normal, no ataxia on exam  - finger tapping: rapid and accurate, bilaterally    REFLEXES:  Reflex Right Left   BR 2+ 2+   Biceps 2+ 2+   Triceps 2+ 2+   Patellae 2+ 2+   Achilles NT NT   Toes NT NT     GAIT:  - narrow base and normal  - heel-raised/toe-raised gait: NT/NT  - tandem gait: NT    REVIEW OF IMAGING STUDIES: I reviewed the following studies:  MRI Brain:  Date: 3/28/2022  W/o and w/ contrast?: yes  Indication: \"dizziness, memory loss, migraine, visual disturbances...\"  Comparison: none  Impression:  \"No sign of intracranial traumatic shear injury or other specific abnormality.  Moderate inflammatory mucosal swelling circumferentially lining the wall of the left maxillary sinus surrounding occluding the left ostiomeatal complex and containing inspissated mucus.\"    REVIEW OF LABORATORY STUDIES:  No recent data available.    ASSESSMENT:  Tesha Mason is a 36 y.o. woman with chronic migraine w/o aura as well as asthma, ADHD, JENN, and PTSD.  Plans/recoimmendations as follows:    PLAN:  Chronic Migraine w/o Aura:  Prevention:  - start Aimovig 70 mg/month  - will avoid amitriptyline and nortriptyline due to " drug-drug interaction with escitalopram  - will avoid propranolol given h/o asthma requiring medication  - try supplementing:  - magnesium: 400-600 mg once or 200-300 mg twice daily  - riboflavin (vitamin B2): 400 mg once daily  - coenzyme Q10: 300 mg daily  - get 7-9 hours of sleep per night; can try supplementing melatonin 2-10 mg, 2-3 hours before bedtime  - drink plenty of fluids (urine should be nearly clear)  - avoid excessive caffeine intake (no more than 2 servings per day and nothing in the afternoon)  - eat regular meals (don't skip meals)  - get moderate exercise (even just a 20 minute walk daily)    Rescue:  - sumatriptan 50 mg: take this at the onset of aura or headache pain; may re-dose x1 after 2 hours if headache persists; if you find you often have to re-dose after 2 hours, simply take 100 mg at the onset of headache pain; do not use more than 2 days/week  - prochlorperazine 5-10 mg: take this at the onset of aura or headache to prevent or reduce nausea; may re-dose after 2 hours if headache or nausea persist; do not use more often than 3 days/week  - do not use analgesics (e.g., ibuprofen, acetaminophen) more than 2 days per week in order to avoid analgesic rebound headaches    - keep a headache log    Follow-Up:  - Return in about 4 months (around 9/19/2022).    Signed: Dorian Love M.D.

## 2022-05-24 PROCEDURE — RXMED WILLOW AMBULATORY MEDICATION CHARGE: Performed by: PSYCHIATRY & NEUROLOGY

## 2022-05-26 ENCOUNTER — DOCUMENTATION (OUTPATIENT)
Dept: PHARMACY | Facility: MEDICAL CENTER | Age: 37
End: 2022-05-26
Payer: COMMERCIAL

## 2022-05-29 NOTE — PROGRESS NOTES
PHARMACIST PRE SCREEN - **TRF Onboarding**     List Drug Allergies: hydrocodone  List Non-Drug Allergies: nka  List ICD-10:  G43.719  List Diagnosis: Intractable chronic migraine without aura and without status migrainosus  List Goals of Therapy:   • To reduce migraine frequency, duration, and severity   • To improve acute medication responsiveness and reduce need for acute attacks   • To identify and modify migraine triggers  Drug Therapy (name/formulation/dose/route of admin/freq): Erenumab (AIMOVIG) 70 mg/mL Auto-injector, 1 pen (70mg/mL) SC Q 30 days   • Appropriateness Review (Y/N + If being prescribed off label, notate supporting reference): Y    • Dose appropriateness (Y/N + BSA, height/weight if applicable): Y    • Any Renal/Hepatic adjustments needed (Y/N + explain)? N    • Comorbidity and Past Medical History reviewed in EMR. Any comorbidities, PMH, precautions, or contraindications that pose medication safety concern (Y/N + document and reach out to provider if appropriate)? N   • EMR med list reviewed. List DDI’s: no clinically significant DDIs   • Patient’s ability to self-administer medication: No issues identified per EMR        PHARMACIST NEW START - Migraine Call Review   Dx:  G43.719 Intractable chronic migraine without aura and without status migrainosus     Tx prescribed:  Erenumab (AIMOVIG) 70 mg/mL Auto-injector SC Q 30 days   • Duration of therapy: until progression, toxicity, or no longer clinically beneficial   • Past Txt: Imitrex; excedrin; venlafaxine; ibuprofen; acetaminophen  Med Rec/updated Drug list: EMR accurate, no medication changes reviewed with Tesha.   • Common DI Avoidance: none   • Acute Episodic Medication Use:  Imitrex; excedrin; OTC tiger balm (at the base of her neck for tension + heat wrap)   • Other Pertinent Migraine Med: prochlorperazine   DI Check:  no clinically significant DDIs  Goals of Therapy:   • To reduce migraine frequency, duration, and severity   • To  "improve acute medication responsiveness and reduce need for acute attacks   • To identify and modify migraine triggers  Patient has agreed to/understands goals of therapy during education/counseling   S/Sx(Baseline)   • # of Migraine Days Past 30 Days:  2 to 3 a week   • Migraine Symptoms:     ? Sensitivity to :     § Light     § Sound     § Nausea      ? Vomiting     ? Pressure    ? Tension - back of neck and head    ? Throbbing    ? Dizziness/vertigo    ? Vision changes: blurry \"right eye since a few sessions ago\"    • Migraine Pain Severity: Usually a 3 to 4 in severity but sometimes it's a 8 - 10; this is when she goes home    • Average Duration of Migraine (Hours): if left untreated/episodic meds ineffective can last up to 24 hours.     ? Medicated: excedrine at onset usually approx 1 to a couple hours. Will take a 2nd dose if not working excedrin PM + melatonin  Labs no recent, last in 2021   • CBC:    • Chem7:    • LFTs:    • BP/HR:  Admin/Schedule:  Erenumab (AIMOVIG) 70 mg/mL Auto-injector, 1 pen (70mg/mL) SC Q 30 days   • Inj technique: decllined review, shared she and her spouse had no difficulty injecting in to her outer upper arm. Advised can also use the top of thigh along the quads (avoiding the inner/outer thighs and saying away from the groin creases and knee cap), and the abdomen (avoiding the bellybutton by at least 2 inches).   Adherence: demoed understanding to dose on the same numerical day of each month   • Missed dose mgmt: n/a   • Barriers (if exist): none     List Current SE Reviewed: Shared she felt a little \"loopy\" after her first injection but went away over time. Review of common side effects and mitigation below  Proper Handling/Storage/Excursion/Disposal: aware to use sharps container once injection is completed. For any pens on hand to store in fridge, protect from heat, light, and freezing. Bring to room temp over 30 minutes but  do not shake, hold, or heat. Once brought to room " "temp not return to refrigeration, can be kept at room temp (max of 77F) for up to 7 days.     Wellness/Lifestyle:     • Triggers: stress   • Diet/hydration: eats at regular intervals, avoids caffeine   • Lifestyle: feels her posture while working contributes to her neck stiffness which in turn triggers her migraines. Currently has a kneeling chair and will change position throughout the day (sitting/standing) and trys to not slouch. Is on the phone throughout the day and has to use a headset/speaker phone to ensure she is not tilting her head to hold the .   Risk for falls (use STEADI): n/a  Vaccines: deferred**  ADLs: none missed   Additional: Had a nice talk with Tesha and welcomed her to our services. She had her first injection administered by her  on 5/19 and verbalized next will be due on 6/19. She feels in a short time she's already starting to see improvements. She'll start to get a HA and then when they typically start to progress they start to subside.     [facility] review/discussion:  She shared a \"loopy\" feeling of when she gets injections and I had advised that is not a known side effect of aimovig but continue to monitor for now. Denies any constipation or injection site reactions but recommend icing her injection site prior to cleaning with alcohol and after admin to limit pain/discomfort; to maintain adequate hydration of 64 - 80 oz/water/day to help minimize constipation. She thanked me for the call and happy to have the additional support; agreeable to future calls from clinical pharmacist team.   "

## 2022-05-31 ENCOUNTER — PHARMACY VISIT (OUTPATIENT)
Dept: PHARMACY | Facility: MEDICAL CENTER | Age: 37
End: 2022-05-31
Payer: COMMERCIAL

## 2022-06-07 ENCOUNTER — APPOINTMENT (OUTPATIENT)
Dept: BEHAVIORAL HEALTH | Facility: PSYCHIATRIC FACILITY | Age: 37
End: 2022-06-07
Payer: COMMERCIAL

## 2022-06-16 ENCOUNTER — TELEPHONE (OUTPATIENT)
Dept: BEHAVIORAL HEALTH | Facility: PSYCHIATRIC FACILITY | Age: 37
End: 2022-06-16

## 2022-06-16 NOTE — TELEPHONE ENCOUNTER
Received request via: Patient    Was the patient seen in the last year in this department? Yes    Does the patient have an active prescription (recently filled or refills available) for medication(s) requested? No     Patient called she needs refill for methylphenidate 36 MG.

## 2022-06-20 ENCOUNTER — TELEPHONE (OUTPATIENT)
Dept: BEHAVIORAL HEALTH | Facility: PSYCHIATRIC FACILITY | Age: 37
End: 2022-06-20
Payer: COMMERCIAL

## 2022-06-20 DIAGNOSIS — F90.2 ADHD (ATTENTION DEFICIT HYPERACTIVITY DISORDER), COMBINED TYPE: ICD-10-CM

## 2022-06-20 RX ORDER — METHYLPHENIDATE HYDROCHLORIDE 36 MG/1
36 TABLET ORAL EVERY MORNING
Qty: 30 TABLET | Refills: 0 | Status: SHIPPED | OUTPATIENT
Start: 2022-06-20 | End: 2022-07-18 | Stop reason: SDUPTHER

## 2022-06-21 ENCOUNTER — APPOINTMENT (OUTPATIENT)
Dept: BEHAVIORAL HEALTH | Facility: PSYCHIATRIC FACILITY | Age: 37
End: 2022-06-21
Payer: COMMERCIAL

## 2022-06-22 ENCOUNTER — PHARMACY VISIT (OUTPATIENT)
Dept: PHARMACY | Facility: MEDICAL CENTER | Age: 37
End: 2022-06-22
Payer: COMMERCIAL

## 2022-06-22 PROCEDURE — RXMED WILLOW AMBULATORY MEDICATION CHARGE: Performed by: PSYCHIATRY & NEUROLOGY

## 2022-07-18 DIAGNOSIS — F90.2 ADHD (ATTENTION DEFICIT HYPERACTIVITY DISORDER), COMBINED TYPE: ICD-10-CM

## 2022-07-18 RX ORDER — METHYLPHENIDATE HYDROCHLORIDE 36 MG/1
36 TABLET ORAL EVERY MORNING
Qty: 30 TABLET | Refills: 0 | Status: SHIPPED | OUTPATIENT
Start: 2022-07-18 | End: 2022-08-15 | Stop reason: SDUPTHER

## 2022-07-18 NOTE — TELEPHONE ENCOUNTER
Pt. Has medication (concerta 36 mg daily) till 7.20.22 and sees new provider in this clinic on 8.1.22.  I personally saw this patient on 7.13.22 in this clinic and will reorder for 30 days.  checked and no concerns.

## 2022-08-01 ENCOUNTER — OFFICE VISIT (OUTPATIENT)
Dept: BEHAVIORAL HEALTH | Facility: PSYCHIATRIC FACILITY | Age: 37
End: 2022-08-01
Payer: COMMERCIAL

## 2022-08-01 VITALS
SYSTOLIC BLOOD PRESSURE: 134 MMHG | OXYGEN SATURATION: 92 % | HEIGHT: 59 IN | BODY MASS INDEX: 29.03 KG/M2 | DIASTOLIC BLOOD PRESSURE: 83 MMHG | WEIGHT: 144 LBS | HEART RATE: 93 BPM

## 2022-08-01 DIAGNOSIS — F90.2 ADHD (ATTENTION DEFICIT HYPERACTIVITY DISORDER), COMBINED TYPE: ICD-10-CM

## 2022-08-01 DIAGNOSIS — F41.1 GENERALIZED ANXIETY DISORDER: ICD-10-CM

## 2022-08-01 DIAGNOSIS — F33.0 MILD EPISODE OF RECURRENT MAJOR DEPRESSIVE DISORDER (HCC): ICD-10-CM

## 2022-08-01 DIAGNOSIS — F43.10 PTSD (POST-TRAUMATIC STRESS DISORDER): ICD-10-CM

## 2022-08-01 PROCEDURE — 99213 OFFICE O/P EST LOW 20 MIN: CPT | Mod: GE | Performed by: STUDENT IN AN ORGANIZED HEALTH CARE EDUCATION/TRAINING PROGRAM

## 2022-08-01 ASSESSMENT — ENCOUNTER SYMPTOMS
HEMOPTYSIS: 0
NAUSEA: 0
CONSTIPATION: 0
DIARRHEA: 0
SHORTNESS OF BREATH: 0
TREMORS: 0
CHILLS: 0
BLOOD IN STOOL: 0
BLURRED VISION: 0
PALPITATIONS: 0
FEVER: 0
VOMITING: 0
HEARTBURN: 1
HEADACHES: 0
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
## 2022-08-08 ENCOUNTER — OFFICE VISIT (OUTPATIENT)
Dept: BEHAVIORAL HEALTH | Facility: PSYCHIATRIC FACILITY | Age: 37
End: 2022-08-08
Payer: COMMERCIAL

## 2022-08-08 DIAGNOSIS — F41.1 GENERALIZED ANXIETY DISORDER: ICD-10-CM

## 2022-08-08 DIAGNOSIS — F43.10 PTSD (POST-TRAUMATIC STRESS DISORDER): ICD-10-CM

## 2022-08-08 DIAGNOSIS — F33.0 MILD EPISODE OF RECURRENT MAJOR DEPRESSIVE DISORDER (HCC): ICD-10-CM

## 2022-08-08 PROCEDURE — 90834 PSYTX W PT 45 MINUTES: CPT | Performed by: STUDENT IN AN ORGANIZED HEALTH CARE EDUCATION/TRAINING PROGRAM

## 2022-08-08 PROCEDURE — RXMED WILLOW AMBULATORY MEDICATION CHARGE: Performed by: PSYCHIATRY & NEUROLOGY

## 2022-08-09 ENCOUNTER — PHARMACY VISIT (OUTPATIENT)
Dept: PHARMACY | Facility: MEDICAL CENTER | Age: 37
End: 2022-08-09
Payer: COMMERCIAL

## 2022-08-10 NOTE — PROGRESS NOTES
[facility]  Psychotherapy Summary Note    Full therapy note has been documented and is under restricted viewing.  Please see below for summary of today's session.     Patient Name: Tesha Mason  Patient MRN: [id number]  Today's Date:  8/8/22    Resident/Fellow providing service: Cherelle Reyes M.D.    Type of session:Individual psychotherapy  Session start time: 2:10  Session stop time: 3:00pm  Length of time spent face to face with patient: 50min  Persons in attendance:Patient    Diagnoses:   1. Generalized anxiety disorder    2. PTSD (post-traumatic stress disorder)    3. Mild episode of recurrent major depressive disorder (HCC)         Symptoms currently being addressed in therapy: anxiety and interpersonal difficulty    Therapeutic Intervention(s): Supportive psychotherapy and Therapeutic relationship    Medications Reviewed: No    Treatment Goal(s)/Objective(s) addressed: establish rapport, interpersonal conflict skills     Progress toward Treatment Goals: No change    Plan:      - Continue weekly therapy.    Discussed with supervising attending, Zaki Bishop MD.    Cherelle Reyes M.D.

## 2022-08-10 NOTE — PSYCHOTHERAPY
" [facility]  Psychotherapy Progress Note    Patient Name: Tesha Mason  Patient MRN: [id number]  Today's Date: 8/9/2022     Resident/Fellow providing service: Cherelle Reyes M.D.  Supervising Attending: Zaki Bishop MD    Type of session:Individual psychotherapy  Session start time: 2;10  Session stop time: 300  Length of time spent face to face with patient: 50m  Persons in attendance:Patient    Subjective/New Info:   Interpersonal conflict with [employer] club she joined with pressure from . Club takes all of spare time and is very small, 8 people. 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 She finds this difficult to maange and as her boss is part of it too, it's all encompasing.     Significant lack of confidence around progress and  Competence due to hx of ADHD making yary difficult and her finally accomplishing her dream to be a  to have politics intervene. They told her she was not able to make adequate reports and fired her during her probation period to give her job to a staff memver returning from leave. She takes that to mean she was incompetent. Avoids [facility] and her friends. She left [facility] after that for lyly to start over. States she runs from anxiety and wants to stop. Will return with list of goals    Objective/Observations:   Participation: Active verbal participation, Engaged and Open to feedback   Grooming: Casual   Cognition: Alert and Fully Oriented   Eye contact: Good   Mood: \"good\"   Affect: Flexible, Full range and Bright   Thought process: Logical, Circumstantial and Tangential   Speech: Rate within normal limits, Volume within normal limits and Hypertalkative   Other:     Diagnoses:   1. Generalized anxiety disorder    2. PTSD (post-traumatic stress disorder)    3. Mild episode of recurrent major depressive disorder (HCC)         Current assessment of risk:   SUICIDE: Not applicable   Homicide: " Not applicable   Self-harm: Not applicable   Relapse: Not applicable   Other:     Therapeutic Intervention(s): Supportive psychotherapy and Therapeutic relationship    Treatment Goal(s)/Objective(s) addressed: interpersonal skills    Progress toward Treatment Goals: No change    Plan:      - Continue weekly therapy.    Cherelle Reyes M.D.  8/9/2022

## 2022-08-15 ENCOUNTER — OFFICE VISIT (OUTPATIENT)
Dept: BEHAVIORAL HEALTH | Facility: PSYCHIATRIC FACILITY | Age: 37
End: 2022-08-15
Payer: COMMERCIAL

## 2022-08-15 DIAGNOSIS — F41.1 GENERALIZED ANXIETY DISORDER: ICD-10-CM

## 2022-08-15 DIAGNOSIS — F90.2 ADHD (ATTENTION DEFICIT HYPERACTIVITY DISORDER), COMBINED TYPE: ICD-10-CM

## 2022-08-15 PROCEDURE — 90837 PSYTX W PT 60 MINUTES: CPT | Performed by: STUDENT IN AN ORGANIZED HEALTH CARE EDUCATION/TRAINING PROGRAM

## 2022-08-15 RX ORDER — METHYLPHENIDATE HYDROCHLORIDE 36 MG/1
36 TABLET ORAL EVERY MORNING
Qty: 30 TABLET | Refills: 0 | Status: SHIPPED | OUTPATIENT
Start: 2022-08-15 | End: 2022-09-13 | Stop reason: SDUPTHER

## 2022-08-15 RX ORDER — ESCITALOPRAM OXALATE 20 MG/1
20 TABLET ORAL
Qty: 30 TABLET | Refills: 3 | Status: SHIPPED | OUTPATIENT
Start: 2022-08-15 | End: 2022-09-13 | Stop reason: SDUPTHER

## 2022-08-16 NOTE — PROGRESS NOTES
[facility]  Psychotherapy Summary Note    Full therapy note has been documented and is under restricted viewing.  Please see below for summary of today's session.     Patient Name: Tesha Mason  Patient MRN: [id number]  Today's Date:      Resident/Fellow providing service: Cherelle Reyes M.D.    Type of session:Medication management with psychotherapy  Session start time: 3:05  Session stop time: 4:00  Length of time spent face to face with patient: 55m  Persons in attendance:Patient    Diagnoses:   1. Generalized anxiety disorder    2. ADHD (attention deficit hyperactivity disorder), combined type         Symptoms currently being addressed in therapy: interpersonal difficulty    Therapeutic Intervention(s): Goal-setting    Medications Reviewed: Yes: refills sent    Treatment Goal(s)/Objective(s) addressed: goal setting, interpersonal convlict     Progress toward Treatment Goals: No change    Plan:      - Continue weekly therapy.    Discussed with supervising attending, Zaki Bishop MD.    Cherelle Reyes M.D.

## 2022-08-16 NOTE — PSYCHOTHERAPY
" [facility]  Psychotherapy Progress Note    Patient Name: Tesha Mason  Patient MRN: [id number]  Today's Date: 8/15/2022     Resident/Fellow providing service: Cherelle Reyes M.D.  Supervising Attending: Zaki Bishop MD    Type of session:Medication management with psychotherapy  Session start time: 3:05  Session stop time: 4:00  Length of time spent face to face with patient: 55m  Persons in attendance:Patient    Subjective/New Info:   Pt was able to speak in meeting but \"blacked out\" about what she said when she was so mad. Has occurred since childhoold. Would consider propranolol but BP inconsistent between 135's and 98 systolic. Unclear If machine inaccurate.     Discussed her goals:   --confidence, to speak out instead of shutting down (issue since childhood)  -find source of confidence lost since before ptsd    Previously she was confident before PTSD, worked security, discussed source of confidence, she will think about for next visit. Found exercise and being physically strong helpful, discussed boxing and working out. She is suseptable to peer pressure in that music and TV shows of empowerment are helpful. Discussed her need to feel validated and has done \"a good job\"     Objective/Observations:   Participation: Active verbal participation, Attentive, and Engaged   Grooming: Casual   Cognition: Alert and Fully Oriented   Eye contact: Good   Mood: \"good\"   Affect: Flexible and Full range   Thought process: Logical and Goal-directed   Speech: Volume within normal limits and Hypertalkative   Other:     Diagnoses:   1. Generalized anxiety disorder    2. ADHD (attention deficit hyperactivity disorder), combined type         Current assessment of risk:   SUICIDE: Low   Homicide: Low   Self-harm: Low   Relapse: Low   Other:    Safety Plan reviewed? Not Indicated   If evidence of imminent risk is present, intervention/plan:     Therapeutic Intervention(s): Goal-setting    Treatment " Goal(s)/Objective(s) addressed: goal setting      Progress toward Treatment Goals: No change    Plan:      - Continue weekly therapy.    Cherelle Reyes M.D.  8/15/2022

## 2022-08-22 ENCOUNTER — OFFICE VISIT (OUTPATIENT)
Dept: BEHAVIORAL HEALTH | Facility: PSYCHIATRIC FACILITY | Age: 37
End: 2022-08-22
Payer: COMMERCIAL

## 2022-08-22 DIAGNOSIS — F41.1 GENERALIZED ANXIETY DISORDER: ICD-10-CM

## 2022-08-22 PROCEDURE — 90837 PSYTX W PT 60 MINUTES: CPT | Performed by: STUDENT IN AN ORGANIZED HEALTH CARE EDUCATION/TRAINING PROGRAM
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
## 2022-08-23 NOTE — PROGRESS NOTES
[facility]  Psychotherapy Summary Note    Full therapy note has been documented and is under restricted viewing.  Please see below for summary of today's session.     Patient Name: Tesha Mason  Patient MRN: [id number]  Today's Date:      Resident/Fellow providing service: Cherelle Reyes M.D.    Type of session:Individual psychotherapy  Session start time: 3:15pm  Session stop time: 4:10pm  Length of time spent face to face with patient: 55m  Persons in attendance:Patient    Diagnoses:   1. Generalized anxiety disorder         Symptoms currently being addressed in therapy: social anxiety and interpersonal difficulty    Therapeutic Intervention(s): Distress tolerance skills and Maladaptive behavior addressed    Medications Reviewed: No    Treatment Goal(s)/Objective(s) addressed: interpersonal skills, confidence     Progress toward Treatment Goals: No change    Plan:      - Continue weekly therapy.    Discussed with supervising attending, Zaki Bishop MD.    Cherelle Reyes M.D.

## 2022-08-29 ENCOUNTER — OFFICE VISIT (OUTPATIENT)
Dept: BEHAVIORAL HEALTH | Facility: PSYCHIATRIC FACILITY | Age: 37
End: 2022-08-29
Payer: COMMERCIAL

## 2022-08-29 DIAGNOSIS — F41.1 GENERALIZED ANXIETY DISORDER: ICD-10-CM

## 2022-08-29 PROCEDURE — 90837 PSYTX W PT 60 MINUTES: CPT | Performed by: STUDENT IN AN ORGANIZED HEALTH CARE EDUCATION/TRAINING PROGRAM

## 2022-08-29 RX ORDER — PROPRANOLOL HYDROCHLORIDE 10 MG/1
5 TABLET ORAL
Qty: 15 TABLET | Refills: 0 | Status: SHIPPED | OUTPATIENT
Start: 2022-08-29 | End: 2022-09-26 | Stop reason: SDUPTHER

## 2022-08-30 NOTE — PSYCHOTHERAPY
" [facility]  Psychotherapy Progress Note    Patient Name: Tesha Mason  Patient MRN: [id number]  Today's Date: 8/29/2022     Resident/Fellow providing service: Cherelle Reyes M.D.  Supervising Attending: Zaki Bishop MD    Type of session:Individual psychotherapy  Session start time: 3:10pm  Session stop time: 4:10pm  Length of time spent face to face with patient: 60m  Persons in attendance:Patient    Subjective/New Info:   Did very well in meeting and was able to go to dinner with  for their anniversary with minimal anxiety, 3/10. She is having poor boundaries sexually at home, at work, and in her family. Her sister is very close with her roommate who just moved in 3 months ago and is part of the [employer] community. Sister is not open. She told the partner of the sister and sister is now very upset. She is feeling more confident in her boundaries and daily interactions with others. She is bringing her support animal to visits and not \"neglicting him at home\" as much. Her daughter is asking some questions about the man that visits a lot who she is having a sexual relationship with, daughter 11yo. Is not discussing with her at this time.     She had increase in migraines and dry mouth with 12.5mg hydroxyzine. Dc'd. Previously failed buspar but does not remember why. Discussed propranolol 5mg daily prn for use before public speaking.     Objective/Observations:   Participation: Active verbal participation, Engaged, and Open to feedback   Grooming: Casual   Cognition: Alert and Fully Oriented   Eye contact: Good   Mood: \"good\"   Affect: Flexible and Full range   Thought process: Logical and Goal-directed   Speech: Rate within normal limits and Volume within normal limits   Other:     Diagnoses:   1. Generalized anxiety disorder         Current assessment of risk:   SUICIDE: Low   Homicide: Not applicable   Self-harm: Low   Relapse: Not applicable   Other:    Safety Plan reviewed? Not " Indicated   If evidence of imminent risk is present, intervention/plan:     Therapeutic Intervention(s): Cognitive behavioral therapy, Conflict resolution skills, and Maladaptive behavior addressed    Treatment Goal(s)/Objective(s) addressed: interpersonal skills, anxiety mitigation      Progress toward Treatment Goals: No change    Plan:      - Continue weekly therapy.    Cherelle Ryees M.D.  8/22/2022

## 2022-08-30 NOTE — PROGRESS NOTES
[facility]  Psychotherapy Summary Note    Full therapy note has been documented and is under restricted viewing.  Please see below for summary of today's session.     Patient Name: Tesha Mason  Patient MRN: [id number]  Today's Date:      Resident/Fellow providing service: Cherelle Reyes M.D.    Type of session:Individual psychotherapy  Session start time: 3:10pm  Session stop time: 4:10pm  Length of time spent face to face with patient: 60m  Persons in attendance:Patient    Diagnoses:   1. Generalized anxiety disorder         Symptoms currently being addressed in therapy: social anxiety and interpersonal difficulty    Therapeutic Intervention(s): Distress tolerance skills and Maladaptive behavior addressed    Medications Reviewed: yes, 5mg propranolol for anxiety daily prn     Treatment Goal(s)/Objective(s) addressed: interpersonal skills, confidence     Progress toward Treatment Goals: No change    Plan:      - Continue weekly therapy.    Discussed with supervising attending, aZki Bishop MD.    Cherelle Reyes M.D.

## 2022-09-09 ENCOUNTER — OFFICE VISIT (OUTPATIENT)
Dept: NEUROLOGY | Facility: MEDICAL CENTER | Age: 37
End: 2022-09-09
Attending: PSYCHIATRY & NEUROLOGY
Payer: COMMERCIAL

## 2022-09-09 ENCOUNTER — TELEPHONE (OUTPATIENT)
Dept: NEUROLOGY | Facility: MEDICAL CENTER | Age: 37
End: 2022-09-09

## 2022-09-09 VITALS
HEART RATE: 79 BPM | RESPIRATION RATE: 15 BRPM | OXYGEN SATURATION: 97 % | SYSTOLIC BLOOD PRESSURE: 116 MMHG | BODY MASS INDEX: 26.89 KG/M2 | HEIGHT: 59 IN | DIASTOLIC BLOOD PRESSURE: 74 MMHG | WEIGHT: 133.38 LBS | TEMPERATURE: 96.8 F

## 2022-09-09 DIAGNOSIS — G43.719 INTRACTABLE CHRONIC MIGRAINE WITHOUT AURA AND WITHOUT STATUS MIGRAINOSUS: Primary | ICD-10-CM

## 2022-09-09 PROBLEM — H81.393 PERIPHERAL VERTIGO OF BOTH EARS: Status: ACTIVE | Noted: 2022-08-11

## 2022-09-09 PROBLEM — A64 STD (FEMALE): Status: ACTIVE | Noted: 2022-06-06

## 2022-09-09 PROBLEM — F90.9 ATTENTION DEFICIT HYPERACTIVITY DISORDER (ADHD): Status: ACTIVE | Noted: 2021-01-11

## 2022-09-09 PROBLEM — J45.20 MILD INTERMITTENT ASTHMA, UNCOMPLICATED: Status: ACTIVE | Noted: 2022-08-11

## 2022-09-09 PROCEDURE — 99214 OFFICE O/P EST MOD 30 MIN: CPT | Performed by: PSYCHIATRY & NEUROLOGY

## 2022-09-09 PROCEDURE — 99211 OFF/OP EST MAY X REQ PHY/QHP: CPT | Performed by: PSYCHIATRY & NEUROLOGY

## 2022-09-09 RX ORDER — ERENUMAB-AOOE 140 MG/ML
140 INJECTION, SOLUTION SUBCUTANEOUS
Qty: 1 ML | Refills: 5 | Status: SHIPPED | OUTPATIENT
Start: 2022-09-09 | End: 2023-05-11 | Stop reason: SDUPTHER

## 2022-09-09 RX ORDER — ELETRIPTAN HYDROBROMIDE 40 MG/1
TABLET, FILM COATED ORAL
Qty: 9 TABLET | Refills: 5 | Status: SHIPPED | OUTPATIENT
Start: 2022-09-09 | End: 2022-10-09

## 2022-09-09 RX ORDER — ONDANSETRON 4 MG/1
4 TABLET, FILM COATED ORAL EVERY 4 HOURS PRN
Qty: 20 TABLET | Refills: 11 | Status: SHIPPED | OUTPATIENT
Start: 2022-09-09 | End: 2022-10-09

## 2022-09-09 ASSESSMENT — FIBROSIS 4 INDEX: FIB4 SCORE: 0.73

## 2022-09-09 NOTE — TELEPHONE ENCOUNTER
Aimovig 140mg/ml SOAJ    Request rec'd via [employer], WILLIAM Hodgson paid copay $5.00 after copay card on file - EXPIRATION [id number] MAX QTY SUPPLY   DAY PERIOD MAXIMUM DAY SUPPLY  ALLOWED, notifying liaison Kinga Mcfadden.  - 09/09/2022 9:39am

## 2022-09-09 NOTE — PROGRESS NOTES
"[facility] NEUROLOGY  GENERAL NEUROLOGY  FOLLOW-UP VISIT    CC: migraine w/o aura    INTERVAL HISTORY:  Tesha Mason is a 36 y.o. woman with chronic migraine w/o aura as well as asthma, ADHD, JENN, and PTSD.  I last saw her in the clinic on 5/16/2022.  At that time I recommended she start Aimovig 70 mg/month and take sumatriptan PRN.  Today, she was unaccompanied, and she provided the following interval history:    The following is a summary of headache symptoms, presented in my standard format:     Family History: unknown (adopted)  Age at onset: childhood  Location: occipital  Radiation: bi-frontal  Frequency: baseline: 1-7 days/week, lately: daily  Duration: 3-5 hours  Headache Days/Month: baseline: 25/30, lately:   Quality: \"pressure, tension, throbbing\"  Intensity: baseline: 4-10/10  Aura: none  Photophobia/Phonophobia/Nausea/Vomiting: yes/yes/yes/no  Provoked by Physical Activity?: no  Triggers: anxiety  Associated Symptoms: \"vertigo\"  Autonomic Signs (such as ptosis, miosis, conjunctival injection, rhinorrhea, increased lacrimation): no  Head Trauma: yes  Association with Menses: no  ED Visits: yes  Hospitalizations: no  Missed Work Days (): yes  Sleep: 7-8 hours/night  Caffeine Intake: de-caf coffee, no soda w/ caffeine  Hydration: keeps well-hydrated  Nutrition: eats regular meals  Exercise:   Analgesic Overuse:      Current Medication Regimen:  - Aimovig 70 mg/month: helpful at reducing intensity of headaches  - ondansetron: helpful  - Excedrin: somewhat helpful     Medications Tried: Response  Preventive:  - venlafaxine: ineffective  - propranolol: 5 mg was ineffective     Abortive:  - ibuprofen  - acetaminophen  - sumatriptan: 50 mg was associated with chest pain and shortness breath     Medications Not Tried:  - tricyclic antidepressants: will avoid given use of escitalopram  - propranolol: will avoid given history of asthma    MEDICATIONS:  Current Outpatient Medications   Medication " Sig    Erenumab-aooe (AIMOVIG) 140 MG/ML Solution Auto-injector Inject 140 mg under the skin Q30 DAYS for 1 dose.    eletriptan (RELPAX) 40 MG tablet Take 40 mg at the onset of aura/HA; may re-dose x1 after 2 hrs if HA persists; MDD: 80 mg; do not use >2 days/week.    ondansetron (ZOFRAN) 4 MG Tab tablet Take 1 Tablet by mouth every four hours as needed for Nausea/Vomiting for up to 30 days.    escitalopram (LEXAPRO) 20 MG tablet Take 1 Tablet by mouth every day.    methylphenidate (CONCERTA) 36 MG CR tablet Take 1 Tablet by mouth every morning for 30 days.    fluticasone (FLONASE) 50 MCG/ACT nasal spray Spray 1 Spray in nose every day.    asa/apap/caffeine (EXCEDRIN) 250-250-65 MG Tab Take 1 Tab by mouth every 6 hours as needed for Headache.    NON SPECIFIED 1 Each by Injection route 2X A WEEK. Allergy shot 2x per week     fexofenadine (ALLEGRA) 180 MG tablet Take 180 mg by mouth Once.    propranolol (INDERAL) 10 MG Tab Take 0.5 Tablets by mouth 1 time a day as needed (for anxiety, take 60min before public speaking) for up to 30 days. (Patient not taking: Reported on 9/9/2022)     MEDICAL, SOCIAL, AND FAMILY HISTORY:  There is no change in the patient's ROS or medical, social, or family histories since the previous visit on 5/19/2022.    REVIEW OF SYSTEMS:  A ROS was completed.  Pertinent positives and negatives were included in the HPI, above.  All other systems were reviewed and are negative.    PHYSICAL EXAM:  General/Medical:  - NAD    Neuro:  MENTAL STATUS: awake and alert; no deficits of speech or language; oriented to person, place, and time; affect was appropriate to situation; pleasant, cooperative    CRANIAL NERVES:    II: acuity: NT, fields: NT, pupils: NT, discs: NT    III/IV/VI: versions: grossly intact    V: facial sensation: NT    VII: facial expression: symmetric    VIII: hearing: intact to voice    IX/X: palate: NT    XI: shoulder shrug: NT    XII: tongue: NT    MOTOR:  - bulk: NT  - tone:  NT  Upper Extremity Strength  (R/L)    NT   Elbow flexion NT   Elbow extension NT   Shoulder abduction NT     Lower Extremity Strength  (R/L)   Hip flexion NT   Knee extension NT   Knee flexion NT   Ankle plantarflexion NT   Ankle dorsiflexion NT     - pronator drift: NT  - abnormal movements: none    SENSATION:  - light touch: NT  - vibration (R/L, seconds): NT at the great toes  - pinprick: NT  - proprioception: NT  - Romberg: absent    COORDINATION:  - finger to nose: NT  - finger tapping: NT    REFLEXES:  Reflex Right Left   BR NT NT   Biceps NT NT   Triceps NT NT   Patellae NT NT   Achilles NT NT   Toes NT NT     GAIT:  - NT    REVIEW OF IMAGING STUDIES:  No additional data since the last visit.    REVIEW OF LABORATORY STUDIES:  8/11/2022:  - CMP: WNL    ASSESSMENT:  Tesha Mason is a 36 y.o. woman with chronic migraine without aura as well as asthma, ADHD, JENN, and PTSD.    PLAN:  Chronic Migraine w/o Aura:  Prevention:  - increase Aimovig to 140 mg/month  - try supplementing:  - magnesium: 400-600 mg once or 200-300 mg twice daily  - riboflavin (vitamin B2): 400 mg once daily  - coenzyme Q10: 300 mg daily  - get 7-9 hours of sleep per night; can try supplementing melatonin 2-10 mg, 2-3 hours before bedtime  - drink plenty of fluids (urine should be nearly clear)  - avoid excessive caffeine intake (no more than 2 servings per day and nothing in the afternoon)  - eat regular meals (don't skip meals)  - get moderate exercise (even just a 20 minute walk daily)    Rescue:  - stop sumatriptan (due to side effects)  - start eletriptan 40 mg: take this at the onset of aura or headache pain; may re-dose x1 after 2 hours if headache persists; do not use more than 2 days/week  - do not use analgesics (e.g., ibuprofen, acetaminophen) more than 2 days per week in order to avoid analgesic rebound headaches    - keep a headache log    Follow-Up:  - Return in about 3 months (around 12/9/2022).    Signed: Dorian NGUYEN  DEBBIE Love.

## 2022-09-12 ENCOUNTER — APPOINTMENT (OUTPATIENT)
Dept: BEHAVIORAL HEALTH | Facility: PSYCHIATRIC FACILITY | Age: 37
End: 2022-09-12
Payer: COMMERCIAL

## 2022-09-13 DIAGNOSIS — F90.2 ADHD (ATTENTION DEFICIT HYPERACTIVITY DISORDER), COMBINED TYPE: ICD-10-CM

## 2022-09-13 DIAGNOSIS — F41.1 GENERALIZED ANXIETY DISORDER: ICD-10-CM

## 2022-09-13 RX ORDER — METHYLPHENIDATE HYDROCHLORIDE 36 MG/1
36 TABLET ORAL EVERY MORNING
Qty: 30 TABLET | Refills: 0 | Status: SHIPPED | OUTPATIENT
Start: 2022-09-13 | End: 2022-10-13

## 2022-09-13 RX ORDER — ESCITALOPRAM OXALATE 20 MG/1
20 TABLET ORAL
Qty: 30 TABLET | Refills: 3 | Status: SHIPPED | OUTPATIENT
Start: 2022-09-13 | End: 2022-11-01

## 2022-09-14 ENCOUNTER — PHARMACY VISIT (OUTPATIENT)
Dept: PHARMACY | Facility: MEDICAL CENTER | Age: 37
End: 2022-09-14
Payer: COMMERCIAL

## 2022-09-14 PROCEDURE — RXMED WILLOW AMBULATORY MEDICATION CHARGE: Performed by: PSYCHIATRY & NEUROLOGY

## 2022-09-19 ENCOUNTER — OFFICE VISIT (OUTPATIENT)
Dept: BEHAVIORAL HEALTH | Facility: PSYCHIATRIC FACILITY | Age: 37
End: 2022-09-19
Payer: COMMERCIAL

## 2022-09-19 DIAGNOSIS — F33.0 MILD EPISODE OF RECURRENT MAJOR DEPRESSIVE DISORDER (HCC): ICD-10-CM

## 2022-09-19 DIAGNOSIS — F41.1 GENERALIZED ANXIETY DISORDER: ICD-10-CM

## 2022-09-19 PROCEDURE — 90837 PSYTX W PT 60 MINUTES: CPT | Mod: GC | Performed by: STUDENT IN AN ORGANIZED HEALTH CARE EDUCATION/TRAINING PROGRAM

## 2022-09-20 NOTE — PROGRESS NOTES
[facility]  Psychotherapy Summary Note    Full therapy note has been documented and is under restricted viewing.  Please see below for summary of today's session.     Patient Name: Tesha Mason  Patient MRN: [id number]  Today's Date:      Resident/Fellow providing service: Cherelle Reyes M.D.    Type of session:Individual psychotherapy  Session start time: 3:10pm  Session stop time: 4:10pm  Length of time spent face to face with patient: 60m  Persons in attendance:Patient    Diagnoses:   1. Generalized anxiety disorder     2.    Major Depression, recurrent, in depressive state    Symptoms currently being addressed in therapy: social anxiety and interpersonal difficulty leading to exacerbation of MDD and SI. Discussed interpersonal conflict skills, conflict resolution plans, and completed a safety plan for SI which listed her  and close family as contacts.pt aware of emergency numbers including [facility] and 911. Pt SI intrusive thoughts with means but without plan or intent to die.    Therapeutic Intervention(s): Distress tolerance skills and Maladaptive behavior addressed. Safety plan completed.     Medications Reviewed: yes, increased to 10mg propranolol for anxiety daily prn.     Treatment Goal(s)/Objective(s) addressed: interpersonal skills, boundary setting     Progress toward Treatment Goals: deteriorated    Plan:      - Continue weekly therapy.    Discussed with supervising attending, Zaki Bishop MD.    Cherelle Reyes M.D.
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
## 2022-10-04 ENCOUNTER — OFFICE VISIT (OUTPATIENT)
Dept: BEHAVIORAL HEALTH | Facility: PSYCHIATRIC FACILITY | Age: 37
End: 2022-10-04
Payer: COMMERCIAL

## 2022-10-04 DIAGNOSIS — F33.0 MILD EPISODE OF RECURRENT MAJOR DEPRESSIVE DISORDER (HCC): ICD-10-CM

## 2022-10-04 DIAGNOSIS — F41.1 GENERALIZED ANXIETY DISORDER: ICD-10-CM

## 2022-10-04 DIAGNOSIS — F43.10 PTSD (POST-TRAUMATIC STRESS DISORDER): ICD-10-CM

## 2022-10-04 PROCEDURE — 90837 PSYTX W PT 60 MINUTES: CPT | Performed by: STUDENT IN AN ORGANIZED HEALTH CARE EDUCATION/TRAINING PROGRAM
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
## 2022-10-05 NOTE — PROGRESS NOTES
[facility]  Psychotherapy Summary Note    Full therapy note has been documented and is under restricted viewing.  Please see below for summary of today's session.     Patient Name: Tesha Mason  Patient MRN: [id number]  Today's Date:     Resident/Fellow providing service: Cherelle Reyes M.D.    Type of session:Individual psychotherapy  Session start time: 3:10pm  Session stop time: 4:10pm  Length of time spent face to face with patient: 60min  Persons in attendance:Patient    Diagnoses:   1. Generalized anxiety disorder    2. PTSD (post-traumatic stress disorder)    3. Mild episode of recurrent major depressive disorder (HCC)           Symptoms currently being addressed in therapy: depression, anxiety, and interpersonal difficulty MDD -SI, reviewed Safety plan reviewed, denies active SI with plan, intrusive thoughts resolved. Has means but no intent as she reminds herself of her daughter and does not want to die. Forward thinking and goal oriented. low risk.     Therapeutic Intervention(s): Conflict resolution skills and Distress tolerance skills    Medications Reviewed: No    Treatment Goal(s)/Objective(s) addressed: interpersonal conflict skills     Progress toward Treatment Goals: Mild improvement and Exacerbation of symptoms    Plan:      - Continue weekly therapy.    Discussed with supervising attending, Latrell Martell MD.    Cherelle Reyes M.D.

## 2022-10-10 ENCOUNTER — PHARMACY VISIT (OUTPATIENT)
Dept: PHARMACY | Facility: MEDICAL CENTER | Age: 37
End: 2022-10-10
Payer: COMMERCIAL

## 2022-10-10 ENCOUNTER — DOCUMENTATION (OUTPATIENT)
Dept: PHARMACY | Facility: MEDICAL CENTER | Age: 37
End: 2022-10-10
Payer: COMMERCIAL

## 2022-10-10 PROCEDURE — RXMED WILLOW AMBULATORY MEDICATION CHARGE: Performed by: PSYCHIATRY & NEUROLOGY

## 2022-10-11 ENCOUNTER — OFFICE VISIT (OUTPATIENT)
Dept: BEHAVIORAL HEALTH | Facility: PSYCHIATRIC FACILITY | Age: 37
End: 2022-10-11
Payer: COMMERCIAL

## 2022-10-11 DIAGNOSIS — F41.1 GENERALIZED ANXIETY DISORDER: ICD-10-CM

## 2022-10-11 DIAGNOSIS — F43.10 PTSD (POST-TRAUMATIC STRESS DISORDER): ICD-10-CM

## 2022-10-11 PROCEDURE — 90837 PSYTX W PT 60 MINUTES: CPT | Performed by: STUDENT IN AN ORGANIZED HEALTH CARE EDUCATION/TRAINING PROGRAM
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
## 2022-10-12 NOTE — PROGRESS NOTES
[facility]  Psychotherapy Summary Note    Full therapy note has been documented and is under restricted viewing.  Please see below for summary of today's session.     Patient Name: Tesha Mason  Patient MRN: [id number]  Today's Date:     Resident/Fellow providing service: Cherelle Reyes M.D.    Type of session:Individual psychotherapy  Session start time: 3:00pm  Session stop time: 4:00pm  Length of time spent face to face with patient: 60min  Persons in attendance:Patient    Diagnoses:   1. Generalized anxiety disorder    2. PTSD (post-traumatic stress disorder)          Symptoms currently being addressed in therapy: depression, anxiety, and interpersonal difficulty denies SI, focusing on interpersonal difficulties and upcoming social stressors.  Denies need for medication changes at this time    Therapeutic Intervention(s): Conflict resolution skills and Distress tolerance skills    Medications Reviewed: No    Treatment Goal(s)/Objective(s) addressed: interpersonal conflict skills     Progress toward Treatment Goals: Mild improvement    Plan:      - Continue weekly therapy.    Discussed with supervising attending, Latrell Martell MD.    Cherelle Reyes M.D.

## 2022-10-14 NOTE — PROGRESS NOTES
====CLINICAL INTERVENTION-PATIENT===  Savannah reached out to [facility] team to relay dosing concern. Tesha had recently increased her Aimovig dose from 70mg monthly to 140mg every 30 days, however was sent a single 70mg dose for her 10/9 fill - confirmed she had started 140mg, provided during last Neuro OV 9/9/22. She had injected the 70mg dose on 10/9 and wonders when she should inject the other 70mg pen that will be arriving from the pharmacy.     Advised she can inject the Aimovig 70mg pen upon arrival and avoid the last injection site by at least 2 inches. Next dose of Aimovig 140mg/mL, one pen injected 11/9/22. Thanked me for the review, no additional questions/concerns at this time

## 2022-10-17 DIAGNOSIS — F90.2 ADHD (ATTENTION DEFICIT HYPERACTIVITY DISORDER), COMBINED TYPE: ICD-10-CM

## 2022-10-17 RX ORDER — METHYLPHENIDATE HYDROCHLORIDE 36 MG/1
36 TABLET ORAL EVERY MORNING
Qty: 30 TABLET | Refills: 0 | Status: CANCELLED | OUTPATIENT
Start: 2022-10-17 | End: 2022-11-16

## 2022-10-18 ENCOUNTER — OFFICE VISIT (OUTPATIENT)
Dept: BEHAVIORAL HEALTH | Facility: PSYCHIATRIC FACILITY | Age: 37
End: 2022-10-18
Payer: COMMERCIAL

## 2022-10-18 DIAGNOSIS — F90.2 ADHD (ATTENTION DEFICIT HYPERACTIVITY DISORDER), COMBINED TYPE: ICD-10-CM

## 2022-10-18 PROCEDURE — 90837 PSYTX W PT 60 MINUTES: CPT | Performed by: STUDENT IN AN ORGANIZED HEALTH CARE EDUCATION/TRAINING PROGRAM

## 2022-10-18 RX ORDER — METHYLPHENIDATE HYDROCHLORIDE 36 MG/1
36 TABLET ORAL EVERY MORNING
Qty: 30 TABLET | Refills: 0 | Status: SHIPPED | OUTPATIENT
Start: 2022-10-18 | End: 2022-11-15 | Stop reason: SDUPTHER

## 2022-10-19 NOTE — PROGRESS NOTES
" [facility]  Psychotherapy Summary Note    Full therapy note has been documented and is under restricted viewing.  Please see below for summary of today's session.     Patient Name: Tesha Mason  Patient MRN: [id number]  Today's Date:     Resident/Fellow providing service: Cherelle Reyes M.D.    Type of session:Individual psychotherapy  Session start time: 3:00pm  Session stop time: 4:00pm  Length of time spent face to face with patient: 60min  Persons in attendance:Patient    Diagnoses:   1. ADHD (attention deficit hyperactivity disorder), combined type          Symptoms currently being addressed in therapy: depression, anxiety, and interpersonal difficulty denies SI, focusing on interpersonal difficulties and upcoming social stressors.  Denies need for medication changes at this time    Therapeutic Intervention(s): Conflict resolution skills and Distress tolerance skills    Medications Reviewed: Yes: Discussed current asthma symptoms.  Patient reports no active asthma attacks in recent memory.  Reports asthma is \"better than it was, I just had spirometry done.\"  Discussed risk/benefits of continuing propanolol due to interaction with asthma.  Patient aware it may exacerbate asthma and would like to continue taking it for anxiety.  Understands she is to discontinue immediately if she has an asthma attack at all.  Increase propanolol to 10 mg twice daily to be used 5 mg twice daily and 5 mg as needed, max 20 mg a day.    Treatment Goal(s)/Objective(s) addressed: interpersonal conflict skills     Progress toward Treatment Goals: Mild improvement    Plan:      - Continue weekly therapy.    Discussed with supervising attending, Latrell Martell MD.    Cherelle Reyes M.D.      "
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
## 2022-10-23 DIAGNOSIS — F41.1 GENERALIZED ANXIETY DISORDER: ICD-10-CM

## 2022-10-24 RX ORDER — PROPRANOLOL HYDROCHLORIDE 10 MG/1
TABLET ORAL
Qty: 30 TABLET | Refills: 1 | Status: SHIPPED | OUTPATIENT
Start: 2022-10-24 | End: 2022-11-17

## 2022-10-24 NOTE — TELEPHONE ENCOUNTER
Received request via: Pharmacy    Was the patient seen in the last year in this department? Yes    Does the patient have an active prescription (recently filled or refills available) for medication(s) requested?  1 Refill Ordered

## 2022-10-25 ENCOUNTER — APPOINTMENT (OUTPATIENT)
Dept: BEHAVIORAL HEALTH | Facility: PSYCHIATRIC FACILITY | Age: 37
End: 2022-10-25
Payer: COMMERCIAL

## 2022-11-01 ENCOUNTER — OFFICE VISIT (OUTPATIENT)
Dept: BEHAVIORAL HEALTH | Facility: PSYCHIATRIC FACILITY | Age: 37
End: 2022-11-01
Payer: COMMERCIAL

## 2022-11-01 DIAGNOSIS — F90.2 ADHD (ATTENTION DEFICIT HYPERACTIVITY DISORDER), COMBINED TYPE: ICD-10-CM

## 2022-11-01 DIAGNOSIS — F41.1 GENERALIZED ANXIETY DISORDER: ICD-10-CM

## 2022-11-01 PROCEDURE — 90837 PSYTX W PT 60 MINUTES: CPT | Performed by: STUDENT IN AN ORGANIZED HEALTH CARE EDUCATION/TRAINING PROGRAM

## 2022-11-01 NOTE — PROGRESS NOTES
[facility]  Psychotherapy Summary Note    Full therapy note has been documented and is under restricted viewing.  Please see below for summary of today's session.     Patient Name: Tehsa Mason  Patient MRN: [id number]  Today's Date:     Resident/Fellow providing service: Cherelle Reyes M.D.    Type of session:Individual psychotherapy  Session start time: 3:00pm  Session stop time: 4:00pm  Length of time spent face to face with patient: 60min  Persons in attendance:Patient    Diagnoses:   1. Generalized anxiety disorder    2. ADHD (attention deficit hyperactivity disorder), combined type            Symptoms currently being addressed in therapy: depression, anxiety, and interpersonal difficulty denies SI, focusing on interpersonal difficulties and upcoming social stressors.  Increase daily use of PRN propranolol for breakthrough anxiety.     Therapeutic Intervention(s): Conflict resolution skills and Distress tolerance skills    Medications Reviewed: yes, increase use of PRN propranolol (10mg every am, 5mg qnoon roughly 3x/w) on top of Lexapro 20mg. Baseline anxiety uncontrolled, cross tapering Lexapro with Zoloft. Lexapro to decrease by 5mg weekly until discontinued. Zoloft to be started at 25mg daily for two weeks then increased to 50mg daily. Longterm plan to DC proranolol PRN, no increase or change in asthma.     Treatment Goal(s)/Objective(s) addressed: interpersonal conflict skills     Progress toward Treatment Goals: Mild improvement in depression symptoms, anxiety continues     Plan:      - Continue weekly therapy.    Discussed with supervising attending, Latrell Martell MD.    Cherelle Reyes M.D.

## 2022-11-01 NOTE — PSYCHOTHERAPY
" [facility]  Psychotherapy Progress Note    Patient Name: Tesha Mason  Patient MRN: [id number]  Today's Date:     Resident/Fellow providing service: Cherelle Reyes M.D.  Supervising Attending: Latrell Martell MD    Type of session:Medication management with psychotherapy  Session start time: 3:00pm  Session stop time: 4:00pm  Length of time spent face to face with patient: 60min  Persons in attendance:Patient    Subjective/New Info:   Continues to have difficulty with identity. Allowed bf to close relationship into triad against what she sees is her identity. Discussed transitioning to a more DBT model in [facility] to address underlinnig cluster B traits and emotional regulation. Having to take proranolol 10mg daily with prn 5mg around noon 3x week, changing to zoloft, discussed possibility of mood stabilizer later to help therapy. Borderline much more evident. Discussed modeling with adrián and how she behaves when she is disregulated can be similar/how she is controlled by dad is similar.       Stressors:   -- not being seen for who I am  -- failing     Objective/Observations:   Participation: Active verbal participation, Attentive, Engaged, and Open to feedback   Grooming: appropriate   Cognition: Alert   Eye contact: appropriate   Mood: \"stable\"   Affect: Flexible and Full range   Thought process: Logical and Goal-directed   Speech: Rate within normal limits and Volume within normal limits   Other:     Diagnoses:   1. Generalized anxiety disorder    2. ADHD (attention deficit hyperactivity disorder), combined type         Current assessment of risk:   SUICIDE: Low   Homicide: Not applicable   Self-harm: Low   Relapse: Not applicable   Other:    Safety Plan reviewed? Yes   If evidence of imminent risk is present, intervention/plan: has safety plan, she gave it to her spouse, bf, and mother    Therapeutic Intervention(s): Distress tolerance skills and Limit-setting    Treatment Goal(s)/Objective(s) " addressed: conflict resolution skills, Starting Zoloft 25mg for two weeks then 50mg for two weeks. Will decrease Lexapro 20mg daily by 5mg every week until discontinued. Ultimate plan to DC propranolol as anxiety reduced. MARCELLO signed for allergist to discuss propranolol     Progress toward Treatment Goals: Mild improvement    Plan:      - Continue weekly therapy.    Cherelle Reyes M.D.

## 2022-11-03 ENCOUNTER — PHARMACY VISIT (OUTPATIENT)
Dept: PHARMACY | Facility: MEDICAL CENTER | Age: 37
End: 2022-11-03
Payer: COMMERCIAL

## 2022-11-03 PROCEDURE — RXMED WILLOW AMBULATORY MEDICATION CHARGE: Performed by: PSYCHIATRY & NEUROLOGY

## 2022-11-08 ENCOUNTER — OFFICE VISIT (OUTPATIENT)
Dept: BEHAVIORAL HEALTH | Facility: PSYCHIATRIC FACILITY | Age: 37
End: 2022-11-08
Payer: COMMERCIAL

## 2022-11-08 DIAGNOSIS — F43.10 PTSD (POST-TRAUMATIC STRESS DISORDER): ICD-10-CM

## 2022-11-08 DIAGNOSIS — F41.1 GENERALIZED ANXIETY DISORDER: ICD-10-CM

## 2022-11-08 PROCEDURE — 90837 PSYTX W PT 60 MINUTES: CPT | Performed by: STUDENT IN AN ORGANIZED HEALTH CARE EDUCATION/TRAINING PROGRAM

## 2022-11-09 NOTE — PROGRESS NOTES
[facility]  Psychotherapy Summary Note    Full therapy note has been documented and is under restricted viewing.  Please see below for summary of today's session.     Patient Name: Tesha Mason  Patient MRN: [id number]  Today's Date:     Resident/Fellow providing service: Cherelle Reyes M.D.    Type of session:Individual psychotherapy  Session start time: 3:00pm  Session stop time: 4:00pm  Length of time spent face to face with patient: 60min  Persons in attendance:Patient    Diagnoses:   1. Generalized anxiety disorder    2. PTSD (post-traumatic stress disorder)          Symptoms currently being addressed in therapy: depression, anxiety, and interpersonal difficulty denies SI, focusing on interpersonal difficulties and upcoming social stressors.  Increase daily use of PRN propranolol for breakthrough anxiety.     Therapeutic Intervention(s): Conflict resolution skills and Distress tolerance skills    Medications Reviewed: yes, Baseline anxiety uncontrolled, cross tapering Lexapro with Zoloft. Lexapro to decrease by 5mg weekly until discontinued. Zoloft to be started at 25mg daily for two weeks then increased to 50mg daily. Longterm plan to DC proranolol PRN, no increase or change in asthma.     Treatment Goal(s)/Objective(s) addressed: interpersonal conflict skills     Progress toward Treatment Goals: Mild improvement in depression symptoms, anxiety continues     Plan:      - Continue weekly therapy.    Discussed with supervising attending, Latrell Martell MD.    Cherelle Reyes M.D.

## 2022-11-09 NOTE — PSYCHOTHERAPY
" [facility]  Psychotherapy Progress Note    Patient Name: Tesha Mason  Patient MRN: [id number]  Today's Date:     Resident/Fellow providing service: Cherelle Reyes M.D.  Supervising Attending: Latrell Martell MD    Type of session:Medication management with psychotherapy  Session start time: 3:00pm  Session stop time: 4:00pm  Length of time spent face to face with patient: 60min  Persons in attendance:Patient    Subjective/New Info:   Doing very well in interpersonal relationships with boyfriend and .  She is presenting the past since I have spoken with her.  Discussed relationship requirements with boyfriend goal to \"get my motor back\" boyfriend of her dancing which is what she previously had done prior to PTSD events.   and boyfriend incredibly supportive to getting her back to baseline with understanding that open relationship can continue after the holidays if necessary.  Is continuing taper with out side effects anxiety is well controlled at work now.  Has very few panic attacks and is goal oriented to increase stress tolerance.      Stressors:   -- not being seen for who I am  -- failing     Objective/Observations:   Participation: Active verbal participation, Attentive, Engaged, and Open to feedback   Grooming: appropriate   Cognition: Alert   Eye contact: appropriate   Mood: \"stable\"   Affect: Flexible and Full range   Thought process: Logical and Goal-directed   Speech: Rate within normal limits and Volume within normal limits   Other:     Diagnoses:   1. Generalized anxiety disorder    2. PTSD (post-traumatic stress disorder)         Current assessment of risk:   SUICIDE: Low   Homicide: Not applicable   Self-harm: Low   Relapse: Not applicable   Other:    Safety Plan reviewed? Yes   If evidence of imminent risk is present, intervention/plan: has safety plan, she gave it to her spouse, bf, and mother    Therapeutic Intervention(s): Distress tolerance skills and " Limit-setting    Treatment Goal(s)/Objective(s) addressed: conflict resolution skills, Starting Zoloft 25mg for two weeks then 50mg for two weeks. Will decrease Lexapro 20mg daily by 5mg every week until discontinued. Ultimate plan to DC propranolol as anxiety reduced. MARCELLO signed for allergist to discuss propranolol     Progress toward Treatment Goals: Mild improvement    Plan:      - Continue weekly therapy.    Cherelle Reyes M.D.

## 2022-11-15 ENCOUNTER — OFFICE VISIT (OUTPATIENT)
Dept: BEHAVIORAL HEALTH | Facility: PSYCHIATRIC FACILITY | Age: 37
End: 2022-11-15
Payer: COMMERCIAL

## 2022-11-15 DIAGNOSIS — F90.2 ADHD (ATTENTION DEFICIT HYPERACTIVITY DISORDER), COMBINED TYPE: ICD-10-CM

## 2022-11-15 PROCEDURE — 90837 PSYTX W PT 60 MINUTES: CPT | Performed by: STUDENT IN AN ORGANIZED HEALTH CARE EDUCATION/TRAINING PROGRAM

## 2022-11-15 RX ORDER — METHYLPHENIDATE HYDROCHLORIDE 36 MG/1
36 TABLET ORAL EVERY MORNING
Qty: 30 TABLET | Refills: 0 | Status: SHIPPED | OUTPATIENT
Start: 2022-11-15 | End: 2022-12-19 | Stop reason: SDUPTHER

## 2022-11-17 DIAGNOSIS — F41.1 GENERALIZED ANXIETY DISORDER: ICD-10-CM

## 2022-11-17 RX ORDER — PROPRANOLOL HYDROCHLORIDE 10 MG/1
TABLET ORAL
Qty: 30 TABLET | Refills: 1 | Status: SHIPPED | OUTPATIENT
Start: 2022-11-17 | End: 2022-12-06

## 2022-11-17 NOTE — TELEPHONE ENCOUNTER
Received request via: Pharmacy    Was the patient seen in the last year in this department? Yes    Does the patient have an active prescription (recently filled or refills available) for medication(s) requested?  Will be out of med on 11/24/22, one refill ordered    Does the patient have custodial Plus and need 100 day supply (blood pressure, diabetes and cholesterol meds only)? Patient does not have SCP

## 2022-11-17 NOTE — PSYCHOTHERAPY
" [facility]  Psychotherapy Progress Note    Patient Name: Tesha Mason  Patient MRN: [id number]  Today's Date:     Resident/Fellow providing service: Cherelle Reyes M.D.  Supervising Attending: Latrell Martell MD    Type of session:Medication management with psychotherapy  Session start time: 3:00pm  Session stop time: 4:00pm  Length of time spent face to face with patient: 60min  Persons in attendance:Patient    Subjective/New Info:   Doing very well in interpersonal relationships with boyfriend and .  She is presenting the past since I have spoken with her.  Discussed relationship requirements with boyfriend goal to \"get my motor back\" boyfriend of her dancing which is what she previously had done prior to PTSD events.   and boyfriend incredibly supportive to getting her back to baseline with understanding that open relationship can continue after the holidays if necessary.  Is continuing taper with out side effects anxiety is well controlled at work now.  Has very few panic attacks and is goal oriented to increase stress tolerance.    Engaged with workup with daughter adrián who is being seen fridays by dr. Melendez. She is having some crossed messages with intent of discussions specifically about telling daughter she does not have bio dad. Is doing well on medicaitons and starting 50mg this week.       Stressors:   -- not being seen for who I am  -- failing     Objective/Observations:   Participation: Active verbal participation, Attentive, Engaged, and Open to feedback   Grooming: appropriate   Cognition: Alert   Eye contact: appropriate   Mood: \"stable\"   Affect: Flexible and Full range   Thought process: Logical and Goal-directed   Speech: Rate within normal limits and Volume within normal limits   Other:     Diagnoses:   1. ADHD (attention deficit hyperactivity disorder), combined type         Current assessment of risk:   SUICIDE: Low   Homicide: Not applicable   Self-harm: " Low   Relapse: Not applicable   Other:    Safety Plan reviewed? Yes   If evidence of imminent risk is present, intervention/plan: has safety plan, she gave it to her spouse, bf, and mother    Therapeutic Intervention(s): Distress tolerance skills and Limit-setting    Treatment Goal(s)/Objective(s) addressed: conflict resolution skills, Starting Zoloft 25mg for two weeks then 50mg for two weeks. Will decrease Lexapro 20mg daily by 5mg every week until discontinued. Ultimate plan to DC propranolol as anxiety reduced. MARCELLO signed for allergist to discuss propranolol     Progress toward Treatment Goals: Mild improvement    Plan:      - Continue weekly therapy.    Cherelle Reyes M.D.

## 2022-11-22 ENCOUNTER — APPOINTMENT (OUTPATIENT)
Dept: BEHAVIORAL HEALTH | Facility: PSYCHIATRIC FACILITY | Age: 37
End: 2022-11-22
Payer: COMMERCIAL

## 2022-11-23 DIAGNOSIS — F41.1 GENERALIZED ANXIETY DISORDER: ICD-10-CM

## 2022-11-23 NOTE — TELEPHONE ENCOUNTER
Received request via: Pharmacy    Was the patient seen in the last year in this department? Yes    Does the patient have an active prescription (recently filled or refills available) for medication(s) requested?  End date: 12/1/22    Does the patient have group home Plus and need 100 day supply (blood pressure, diabetes and cholesterol meds only)? Patient does not have SCP

## 2022-11-25 DIAGNOSIS — F41.1 GENERALIZED ANXIETY DISORDER: ICD-10-CM

## 2022-11-25 RX ORDER — SERTRALINE HYDROCHLORIDE 100 MG/1
100 TABLET, FILM COATED ORAL DAILY
Qty: 30 TABLET | Refills: 2 | Status: SHIPPED | OUTPATIENT
Start: 2022-11-25 | End: 2022-12-06

## 2022-11-29 ENCOUNTER — APPOINTMENT (OUTPATIENT)
Dept: BEHAVIORAL HEALTH | Facility: PSYCHIATRIC FACILITY | Age: 37
End: 2022-11-29
Payer: COMMERCIAL

## 2022-11-30 PROCEDURE — RXMED WILLOW AMBULATORY MEDICATION CHARGE: Performed by: PSYCHIATRY & NEUROLOGY

## 2022-12-01 ENCOUNTER — DOCUMENTATION (OUTPATIENT)
Dept: PHARMACY | Facility: MEDICAL CENTER | Age: 37
End: 2022-12-01
Payer: COMMERCIAL

## 2022-12-01 ENCOUNTER — PHARMACY VISIT (OUTPATIENT)
Dept: PHARMACY | Facility: MEDICAL CENTER | Age: 37
End: 2022-12-01
Payer: COMMERCIAL

## 2022-12-01 NOTE — PROGRESS NOTES
Spoke with pt regarding Aimovig refill $0/30. Pt is doing great on medication and reports no changes. Pt has 0 doses remaining and next dose due 12/9. Pt had no questions or concerns. Delivery scheduled 12/2

## 2022-12-06 ENCOUNTER — OFFICE VISIT (OUTPATIENT)
Dept: BEHAVIORAL HEALTH | Facility: PSYCHIATRIC FACILITY | Age: 37
End: 2022-12-06
Payer: COMMERCIAL

## 2022-12-06 ENCOUNTER — TELEPHONE (OUTPATIENT)
Dept: NEUROLOGY | Facility: MEDICAL CENTER | Age: 37
End: 2022-12-06
Payer: COMMERCIAL

## 2022-12-06 DIAGNOSIS — F41.1 GENERALIZED ANXIETY DISORDER: ICD-10-CM

## 2022-12-06 PROCEDURE — 90837 PSYTX W PT 60 MINUTES: CPT | Performed by: STUDENT IN AN ORGANIZED HEALTH CARE EDUCATION/TRAINING PROGRAM

## 2022-12-06 RX ORDER — SERTRALINE HYDROCHLORIDE 100 MG/1
150 TABLET, FILM COATED ORAL DAILY
Qty: 30 TABLET | Refills: 2 | Status: SHIPPED | OUTPATIENT
Start: 2022-12-06 | End: 2023-02-20 | Stop reason: SDUPTHER

## 2022-12-13 ENCOUNTER — OFFICE VISIT (OUTPATIENT)
Dept: BEHAVIORAL HEALTH | Facility: PSYCHIATRIC FACILITY | Age: 37
End: 2022-12-13
Payer: COMMERCIAL

## 2022-12-13 ENCOUNTER — APPOINTMENT (OUTPATIENT)
Dept: NEUROLOGY | Facility: MEDICAL CENTER | Age: 37
End: 2022-12-13
Attending: PSYCHIATRY & NEUROLOGY
Payer: COMMERCIAL

## 2022-12-13 DIAGNOSIS — F43.10 PTSD (POST-TRAUMATIC STRESS DISORDER): ICD-10-CM

## 2022-12-13 DIAGNOSIS — F90.2 ADHD (ATTENTION DEFICIT HYPERACTIVITY DISORDER), COMBINED TYPE: ICD-10-CM

## 2022-12-13 DIAGNOSIS — F41.1 GENERALIZED ANXIETY DISORDER: ICD-10-CM

## 2022-12-13 PROCEDURE — 90837 PSYTX W PT 60 MINUTES: CPT | Performed by: STUDENT IN AN ORGANIZED HEALTH CARE EDUCATION/TRAINING PROGRAM

## 2022-12-14 DIAGNOSIS — F90.2 ADHD (ATTENTION DEFICIT HYPERACTIVITY DISORDER), COMBINED TYPE: ICD-10-CM

## 2022-12-14 NOTE — TELEPHONE ENCOUNTER
Received request via: Patient    Was the patient seen in the last year in this department? Yes    Does the patient have an active prescription (recently filled or refills available) for medication(s) requested? No    Patient called pt needs refill for methylphenidate 36 MG. Thank you

## 2022-12-14 NOTE — PROGRESS NOTES
[facility]  Psychotherapy Summary Note    Full therapy note has been documented and is under restricted viewing.  Please see below for summary of today's session.     Patient Name: Tesha Mason  Patient MRN: [id number]  Today's Date:     Resident/Fellow providing service: Cherelle Reyes M.D.    Type of session:Individual psychotherapy  Session start time: 3:00pm  Session stop time: 4:00pm  Length of time spent face to face with patient: 60min  Persons in attendance:Patient    Diagnoses:   1. Generalized anxiety disorder          Symptoms currently being addressed in therapy: depression, anxiety, and interpersonal difficulty denies SI, focusing on interpersonal difficulties and upcoming social stressors.     Therapeutic Intervention(s): Conflict resolution skills and Distress tolerance skills    Medications Reviewed: yes, refilled concerta. Zoloft increased to 150mg qam  with good effect on anxiety and no side effects. Denies panic attacks. Propranolol Dc'd.     Treatment Goal(s)/Objective(s) addressed: interpersonal conflict skills     Progress toward Treatment Goals: Mild improvement in depression symptoms, anxiety continues     Plan:      - Continue weekly therapy.    Discussed with supervising attending, Latrell Martell MD.    Cherelle Reyes M.D.

## 2022-12-14 NOTE — PROGRESS NOTES
[facility]  Psychotherapy Summary Note    Full therapy note has been documented and is under restricted viewing.  Please see below for summary of today's session.     Patient Name: Tesha Mason  Patient MRN: [id number]  Today's Date:     Resident/Fellow providing service: Cherelle Reyes M.D.    Type of session:Individual psychotherapy  Session start time: 3:00pm  Session stop time: 4:00pm  Length of time spent face to face with patient: 60min  Persons in attendance:Patient    Diagnoses:   1. ADHD (attention deficit hyperactivity disorder), combined type          Symptoms currently being addressed in therapy: depression, anxiety, and interpersonal difficulty denies SI, focusing on interpersonal difficulties and upcoming social stressors.     Therapeutic Intervention(s): Conflict resolution skills and Distress tolerance skills    Medications Reviewed: NO    Treatment Goal(s)/Objective(s) addressed: interpersonal conflict skills     Progress toward Treatment Goals: Mild improvement in depression symptoms, anxiety continues     Plan:      - Continue weekly therapy.    Discussed with supervising attending, Zaki Bishop MD.    Cherelle Reyes M.D.

## 2022-12-14 NOTE — PSYCHOTHERAPY
" [facility]  Psychotherapy Progress Note    Patient Name: Tesha Mason  Patient MRN: [id number]  Today's Date:     Resident/Fellow providing service: Cherelle Reyes M.D.  Supervising Attending: Latrell Martell MD    Type of session:Medication management with psychotherapy  Session start time: 3:00pm  Session stop time: 4:00pm  Length of time spent face to face with patient: 60min  Persons in attendance:Patient    Subjective/New Info:   Increased interpersonal conflict with  as he pulls away. Declines sex and wants to be with other women. Tesha increases needs from him. As he changed to working nights she is upset he is not home and requires BF to talk her to sleep every night. Discussed self soothing options that do not involve others including calm dawn and meditation. She agreed to trial. Agreed to use stress reduction skills at her upcoming trip to [place]. Discussed how to time it throughout the day to decrease anxiety and behavioral issues with her and child.     Relationship with child still strained, some missed appointments with Dr. Melendez but otherwise family trying to engage with treatment though have poor insight into overall effects of behaviors on child.       Stressors:   -- not being seen for who I am  -- failing     Objective/Observations:   Participation: Active verbal participation, Attentive, Engaged, and Open to feedback   Grooming: appropriate   Cognition: Alert   Eye contact: appropriate   Mood: \"alright\"   Affect: Flexible and Full range   Thought process: Logical and Goal-directed   Speech: Rate within normal limits and Volume within normal limits   Other:     Diagnoses:   1. Generalized anxiety disorder         Current assessment of risk:   SUICIDE: Low   Homicide: Not applicable   Self-harm: Low   Relapse: Not applicable   Other:    Safety Plan reviewed? Yes   If evidence of imminent risk is present, intervention/plan: has safety plan, she gave it to her spouse, bf, " and mother    Therapeutic Intervention(s): Distress tolerance skills and Limit-setting    Treatment Goal(s)/Objective(s) addressed: conflict resolution skills, stress tolerance skills     Progress toward Treatment Goals: Mild improvement    Plan:      - Continue weekly therapy.    Cherelle Reyes M.D.
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
## 2022-12-16 ENCOUNTER — OFFICE VISIT (OUTPATIENT)
Dept: NEUROLOGY | Facility: MEDICAL CENTER | Age: 37
End: 2022-12-16
Attending: PSYCHIATRY & NEUROLOGY
Payer: COMMERCIAL

## 2022-12-16 VITALS
BODY MASS INDEX: 27.03 KG/M2 | TEMPERATURE: 97.3 F | WEIGHT: 133.82 LBS | DIASTOLIC BLOOD PRESSURE: 78 MMHG | OXYGEN SATURATION: 99 % | SYSTOLIC BLOOD PRESSURE: 124 MMHG | HEART RATE: 70 BPM

## 2022-12-16 DIAGNOSIS — G43.719 INTRACTABLE CHRONIC MIGRAINE WITHOUT AURA AND WITHOUT STATUS MIGRAINOSUS: ICD-10-CM

## 2022-12-16 PROCEDURE — 99213 OFFICE O/P EST LOW 20 MIN: CPT | Performed by: PSYCHIATRY & NEUROLOGY

## 2022-12-16 PROCEDURE — 99212 OFFICE O/P EST SF 10 MIN: CPT | Performed by: PSYCHIATRY & NEUROLOGY

## 2022-12-16 RX ORDER — METHYLPHENIDATE HYDROCHLORIDE 5 MG/1
5 TABLET ORAL 2 TIMES DAILY
COMMUNITY
End: 2022-12-20 | Stop reason: CLARIF

## 2022-12-16 ASSESSMENT — FIBROSIS 4 INDEX: FIB4 SCORE: 0.65

## 2022-12-16 NOTE — PROGRESS NOTES
"[facility] NEUROLOGY  GENERAL NEUROLOGY  FOLLOW-UP VISIT    CC: migraine w/o aura    INTERVAL HISTORY:  Tesha Mason is a 37 y.o. woman with chronic migraine w/o aura as well as asthma, ADHD, JENN, and PTSD.  I last saw her in the clinic on 9/9/2022.  At that time I recommended she increase Aimovig to 140 mg/month and take eletritpan PRN.  Today, she was unaccompanied, and she provided the following interval history:    The following is a summary of headache symptoms, presented in my standard format:     Family History: unknown (adopted)  Age at onset: childhood  Location: occipital  Radiation: bi-frontal  Frequency: baseline: 1-7 days/week, lately: every 2-3 days  Duration: 3-5 hours  Headache Days/Month: baseline: 25/30, lately: 10/30  Quality: \"pressure, tension, throbbing\"  Intensity: baseline: 4-10/10, lately: 2-3/10  Aura: none  Photophobia/Phonophobia/Nausea/Vomiting: yes/yes/yes/no  Provoked by Physical Activity?: no  Triggers: anxiety  Associated Symptoms: \"vertigo\"  Autonomic Signs (such as ptosis, miosis, conjunctival injection, rhinorrhea, increased lacrimation): no  Head Trauma: yes  Association with Menses: no  ED Visits: yes  Hospitalizations: no  Missed Work Days (): yes  Sleep: 6 hours/night  Caffeine Intake: de-caf coffee, no soda w/ caffeine  Hydration: keeps well-hydrated  Nutrition: eats regular meals  Exercise:   Analgesic Overuse:      Current Medication Regimen:  - Aimovig 140 mg/month: helpful, better than 70 mg/month dosage  - Excedrin PM: helpful  - eletritpan: hasn't tried this yet (forgot about it)    - ondansetron: helpful  - Excedrin: somewhat helpful     Medications Tried: Response  Preventive:  - venlafaxine: ineffective  - propranolol: 5 mg was ineffective  - Aimovig 70 mg/month: helpful at reducing intensity of headaches     Abortive:  - ibuprofen  - acetaminophen  - sumatriptan: 50 mg was associated with chest pain and shortness breath     Medications Not Tried:  - " tricyclic antidepressants: will avoid given use of escitalopram  - propranolol: will avoid given history of asthma    MEDICATIONS:  Current Outpatient Medications   Medication Sig    methylphenidate (RITALIN) 5 MG Tab Take 5 mg by mouth 2 times a day.    sertraline (ZOLOFT) 100 MG Tab Take 1.5 Tablets by mouth every day.    Erenumab-aooe (AIMOVIG) 140 MG/ML Solution Auto-injector Inject 140 mg under the skin every 30 days.    fluticasone (FLONASE) 50 MCG/ACT nasal spray Spray 1 Spray in nose every day.    asa/apap/caffeine (EXCEDRIN) 250-250-65 MG Tab Take 1 Tab by mouth every 6 hours as needed for Headache.    NON SPECIFIED 1 Each by Injection route 2X A WEEK. Allergy shot 2x per week     fexofenadine (ALLEGRA) 180 MG tablet Take 180 mg by mouth Once.     MEDICAL, SOCIAL, AND FAMILY HISTORY:  There is no change in the patient's ROS or medical, social, or family histories since the previous visit on 9/9/2022.    REVIEW OF SYSTEMS:  A ROS was completed.  Pertinent positives and negatives were included in the HPI, above.  All other systems were reviewed and are negative.    PHYSICAL EXAM:  General/Medical:  - NAD    Neuro:  MENTAL STATUS: awake and alert; no deficits of speech or language; oriented to person, place, and time; affect was appropriate to situation; pleasant, cooperative    CRANIAL NERVES:    II: acuity: NT, fields: NT, pupils: NT, discs: NT    III/IV/VI: versions: grossly intact    V: facial sensation: NT    VII: facial expression: symmetric    VIII: hearing: intact to voice    IX/X: palate: NT    XI: shoulder shrug: NT    XII: tongue: NT    MOTOR:  - bulk: NT  - tone: NT  Upper Extremity Strength  (R/L)    NT   Elbow flexion NT   Elbow extension NT   Shoulder abduction NT     Lower Extremity Strength  (R/L)   Hip flexion NT   Knee extension NT   Knee flexion NT   Ankle plantarflexion NT   Ankle dorsiflexion NT     - pronator drift: NT  - abnormal movements: none    SENSATION:  - light touch: NT  -  vibration (R/L, seconds): NT at the great toes  - pinprick: NT  - proprioception: NT  - Romberg: absent    COORDINATION:  - finger to nose: NT  - finger tapping: NT    REFLEXES:  Reflex Right Left   BR NT NT   Biceps NT NT   Triceps NT NT   Patellae NT NT   Achilles NT NT   Toes NT NT     GAIT:  - NT    REVIEW OF IMAGING STUDIES:  No additional data since the last visit.    REVIEW OF LABORATORY STUDIES:  8/11/2022:  - CMP: WNL    ASSESSMENT:  Tesha Mason is a 37 y.o. woman with chronic migraine without aura as well as asthma, ADHD, JENN, and PTSD.    PLAN:  Chronic Migraine w/o Aura:  Prevention:  - continue Aimovig to 140 mg/month  - try supplementing:  - magnesium: 400-600 mg once or 200-300 mg twice daily  - riboflavin (vitamin B2): 400 mg once daily  - coenzyme Q10: 300 mg daily  - get 7-9 hours of sleep per night; can try supplementing melatonin 2-10 mg, 2-3 hours before bedtime  - drink plenty of fluids (urine should be nearly clear)  - avoid excessive caffeine intake (no more than 2 servings per day and nothing in the afternoon)  - eat regular meals (don't skip meals)  - get moderate exercise (even just a 20 minute walk daily)    Rescue:  - continue eletriptan 40 mg: take this at the onset of aura or headache pain; may re-dose x1 after 2 hours if headache persists; do not use more than 2 days/week  - samples of Nurtec 75 mg: take 1 tablet (75 mg) at the onset of aura/headache; limit 1 tablet (75 mg) in 24 hours; try to limit Nurtec to 1 tablet every 48 hours; do not dose Nurtec and any triptan within 24-hours of one another  - do not use analgesics (e.g., ibuprofen, acetaminophen) more than 2 days per week in order to avoid analgesic rebound headaches    - keep a headache log    Follow-Up:  - Return in about 6 months (around 6/16/2023).    Signed: Dorian Love M.D.    BILLING DOCUMENTATION:   I spent 20 minutes reviewing the medical record, interviewing and examining the patient, discussing my  "impression (see \"assessment\" above), and coordinating care.  "

## 2022-12-19 DIAGNOSIS — F41.1 GENERALIZED ANXIETY DISORDER: ICD-10-CM

## 2022-12-19 DIAGNOSIS — F90.2 ADHD (ATTENTION DEFICIT HYPERACTIVITY DISORDER), COMBINED TYPE: ICD-10-CM

## 2022-12-19 NOTE — TELEPHONE ENCOUNTER
Received request via: Patient    Was the patient seen in the last year in this department? Yes    Does the patient have an active prescription (recently filled or refills available) for medication(s) requested? No    Patient called pt needs refill for sertraline 100 MG not sure what other medication she is on. Thank you

## 2022-12-20 RX ORDER — METHYLPHENIDATE HYDROCHLORIDE 36 MG/1
36 TABLET ORAL EVERY MORNING
Qty: 30 TABLET | Refills: 0 | OUTPATIENT
Start: 2022-12-20 | End: 2023-01-19

## 2022-12-20 RX ORDER — METHYLPHENIDATE HYDROCHLORIDE 36 MG/1
36 TABLET ORAL EVERY MORNING
Qty: 30 TABLET | Refills: 0 | Status: SHIPPED | OUTPATIENT
Start: 2022-12-20 | End: 2023-01-10 | Stop reason: DRUGHIGH

## 2022-12-20 RX ORDER — SERTRALINE HYDROCHLORIDE 100 MG/1
150 TABLET, FILM COATED ORAL DAILY
Qty: 30 TABLET | Refills: 2 | OUTPATIENT
Start: 2022-12-20

## 2022-12-27 ENCOUNTER — APPOINTMENT (OUTPATIENT)
Dept: BEHAVIORAL HEALTH | Facility: PSYCHIATRIC FACILITY | Age: 37
End: 2022-12-27
Payer: COMMERCIAL

## 2023-01-10 ENCOUNTER — OFFICE VISIT (OUTPATIENT)
Dept: BEHAVIORAL HEALTH | Facility: PSYCHIATRIC FACILITY | Age: 38
End: 2023-01-10
Payer: COMMERCIAL

## 2023-01-10 DIAGNOSIS — F90.2 ADHD (ATTENTION DEFICIT HYPERACTIVITY DISORDER), COMBINED TYPE: Primary | ICD-10-CM

## 2023-01-10 PROCEDURE — 90837 PSYTX W PT 60 MINUTES: CPT | Performed by: STUDENT IN AN ORGANIZED HEALTH CARE EDUCATION/TRAINING PROGRAM

## 2023-01-10 RX ORDER — METHYLPHENIDATE HYDROCHLORIDE 54 MG/1
54 TABLET, EXTENDED RELEASE ORAL EVERY MORNING
Qty: 30 TABLET | Refills: 0 | Status: SHIPPED | OUTPATIENT
Start: 2023-01-19 | End: 2023-02-20 | Stop reason: SDUPTHER
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
## 2023-01-11 NOTE — PROGRESS NOTES
[facility]  Psychotherapy Summary Note    Full therapy note has been documented and is under restricted viewing.  Please see below for summary of today's session.     Patient Name: Tesha Mason  Patient MRN: [id number]  Today's Date:     Resident/Fellow providing service: Cherelle Reyes M.D.    Type of session:Individual psychotherapy  Session start time: 3:00pm  Session stop time: 4:00pm  Length of time spent face to face with patient: 60min  Persons in attendance:Patient    Diagnoses:   1. ADHD (attention deficit hyperactivity disorder), combined type          Symptoms currently being addressed in therapy: anxiety and interpersonal relationship skills    Therapeutic Intervention(s): Conflict resolution skills and Distress tolerance skills    Medications Reviewed: Yes, increased concerta to 54mg ER due to decrease in concentration and impulsivity. RX will start 1/19/23    Treatment Goal(s)/Objective(s) addressed: interpersonal conflict skills     Progress toward Treatment Goals: Mild improvement in depression symptoms and moderate improvement in anxiety    Plan:      - Continue weekly therapy.    Discussed with supervising attending, Zaki Bishop MD.    Cherelle Reyes M.D.

## 2023-01-17 ENCOUNTER — APPOINTMENT (OUTPATIENT)
Dept: BEHAVIORAL HEALTH | Facility: PSYCHIATRIC FACILITY | Age: 38
End: 2023-01-17
Payer: COMMERCIAL

## 2023-01-23 PROCEDURE — RXMED WILLOW AMBULATORY MEDICATION CHARGE: Performed by: PSYCHIATRY & NEUROLOGY

## 2023-01-24 ENCOUNTER — APPOINTMENT (OUTPATIENT)
Dept: BEHAVIORAL HEALTH | Facility: PSYCHIATRIC FACILITY | Age: 38
End: 2023-01-24
Payer: COMMERCIAL

## 2023-01-27 ENCOUNTER — PHARMACY VISIT (OUTPATIENT)
Dept: PHARMACY | Facility: MEDICAL CENTER | Age: 38
End: 2023-01-27
Payer: COMMERCIAL

## 2023-01-31 ENCOUNTER — TELEMEDICINE (OUTPATIENT)
Dept: BEHAVIORAL HEALTH | Facility: PSYCHIATRIC FACILITY | Age: 38
End: 2023-01-31
Payer: COMMERCIAL

## 2023-01-31 DIAGNOSIS — F43.10 PTSD (POST-TRAUMATIC STRESS DISORDER): ICD-10-CM

## 2023-01-31 DIAGNOSIS — F90.2 ADHD (ATTENTION DEFICIT HYPERACTIVITY DISORDER), COMBINED TYPE: ICD-10-CM

## 2023-01-31 DIAGNOSIS — F41.1 GENERALIZED ANXIETY DISORDER: ICD-10-CM

## 2023-01-31 PROCEDURE — 90837 PSYTX W PT 60 MINUTES: CPT | Performed by: STUDENT IN AN ORGANIZED HEALTH CARE EDUCATION/TRAINING PROGRAM

## 2023-01-31 NOTE — PROGRESS NOTES
[facility]  Psychotherapy Summary Note    Full therapy note has been documented and is under restricted viewing.  Please see below for summary of today's session.     Patient Name: Tesha Mason  Patient MRN: [id number]  Today's Date:     Resident/Fellow providing service: Cherelle Reyes M.D.    Type of session:Individual psychotherapy, telehealth appointment   Session start time: 3:00pm  Session stop time: 4:00pm  Length of time spent face to face with patient: 60min  Persons in attendance:Patient    Diagnoses:   1. ADHD (attention deficit hyperactivity disorder), combined type          Symptoms currently being addressed in therapy: anxiety and interpersonal relationship skills    Therapeutic Intervention(s): Conflict resolution skills and Distress tolerance skills    Medications Reviewed: Yes, increased concerta to 54mg ER due to decrease in concentration and impulsivity. RX will start 1/19/23    Treatment Goal(s)/Objective(s) addressed: interpersonal conflict skills     Progress toward Treatment Goals: Mild improvement in depression symptoms and moderate improvement in anxiety    Plan:      - Continue weekly therapy.    Discussed with supervising attending, Dr. Jayshree MD.    Cherelle Reyes M.D.
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
## 2023-02-06 ENCOUNTER — DOCUMENTATION (OUTPATIENT)
Dept: PHARMACY | Facility: MEDICAL CENTER | Age: 38
End: 2023-02-06
Payer: COMMERCIAL
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
## 2023-02-17 DIAGNOSIS — F90.2 ADHD (ATTENTION DEFICIT HYPERACTIVITY DISORDER), COMBINED TYPE: ICD-10-CM

## 2023-02-17 NOTE — TELEPHONE ENCOUNTER
Received request via: Patient    Was the patient seen in the last year in this department? Yes    Does the patient have an active prescription (recently filled or refills available) for medication(s) requested? No    Patient called pt needs refill for methylphenidate 54 MG. Thank you

## 2023-02-20 DIAGNOSIS — F90.2 ADHD (ATTENTION DEFICIT HYPERACTIVITY DISORDER), COMBINED TYPE: ICD-10-CM

## 2023-02-20 DIAGNOSIS — F41.1 GENERALIZED ANXIETY DISORDER: ICD-10-CM

## 2023-02-21 ENCOUNTER — TELEPHONE (OUTPATIENT)
Dept: NEUROLOGY | Facility: MEDICAL CENTER | Age: 38
End: 2023-02-21

## 2023-02-21 ENCOUNTER — TELEMEDICINE (OUTPATIENT)
Dept: BEHAVIORAL HEALTH | Facility: PSYCHIATRIC FACILITY | Age: 38
End: 2023-02-21
Payer: COMMERCIAL

## 2023-02-21 ENCOUNTER — OFFICE VISIT (OUTPATIENT)
Dept: NEUROLOGY | Facility: MEDICAL CENTER | Age: 38
End: 2023-02-21
Attending: PSYCHIATRY & NEUROLOGY
Payer: COMMERCIAL

## 2023-02-21 VITALS
DIASTOLIC BLOOD PRESSURE: 60 MMHG | HEIGHT: 59 IN | HEART RATE: 73 BPM | SYSTOLIC BLOOD PRESSURE: 100 MMHG | OXYGEN SATURATION: 97 % | WEIGHT: 132.06 LBS | BODY MASS INDEX: 26.62 KG/M2 | TEMPERATURE: 97.5 F

## 2023-02-21 DIAGNOSIS — F90.2 ADHD (ATTENTION DEFICIT HYPERACTIVITY DISORDER), COMBINED TYPE: ICD-10-CM

## 2023-02-21 DIAGNOSIS — F41.1 GENERALIZED ANXIETY DISORDER: ICD-10-CM

## 2023-02-21 DIAGNOSIS — G43.719 INTRACTABLE CHRONIC MIGRAINE WITHOUT AURA AND WITHOUT STATUS MIGRAINOSUS: Primary | ICD-10-CM

## 2023-02-21 PROCEDURE — 99214 OFFICE O/P EST MOD 30 MIN: CPT | Performed by: PSYCHIATRY & NEUROLOGY

## 2023-02-21 PROCEDURE — 90832 PSYTX W PT 30 MINUTES: CPT | Mod: GT | Performed by: STUDENT IN AN ORGANIZED HEALTH CARE EDUCATION/TRAINING PROGRAM

## 2023-02-21 PROCEDURE — 99212 OFFICE O/P EST SF 10 MIN: CPT | Performed by: PSYCHIATRY & NEUROLOGY

## 2023-02-21 RX ORDER — RIMEGEPANT SULFATE 75 MG/75MG
75 TABLET, ORALLY DISINTEGRATING ORAL
Qty: 8 TABLET | Refills: 5 | Status: SHIPPED | OUTPATIENT
Start: 2023-02-21 | End: 2023-05-11 | Stop reason: SDUPTHER

## 2023-02-21 RX ORDER — CYCLOBENZAPRINE HCL 5 MG
5 TABLET ORAL
COMMUNITY
Start: 2023-01-24 | End: 2023-06-20

## 2023-02-21 RX ORDER — METHYLPHENIDATE HYDROCHLORIDE 54 MG/1
54 TABLET ORAL
COMMUNITY
Start: 2023-01-19 | End: 2023-04-18 | Stop reason: SDUPTHER

## 2023-02-21 RX ORDER — METHYLPHENIDATE HYDROCHLORIDE 54 MG/1
54 TABLET, EXTENDED RELEASE ORAL EVERY MORNING
Qty: 30 TABLET | Refills: 0 | Status: SHIPPED | OUTPATIENT
Start: 2023-02-21 | End: 2023-03-22 | Stop reason: SDUPTHER

## 2023-02-21 RX ORDER — METHYLPHENIDATE HYDROCHLORIDE 54 MG/1
54 TABLET, EXTENDED RELEASE ORAL EVERY MORNING
Qty: 30 TABLET | Refills: 0 | OUTPATIENT
Start: 2023-02-21 | End: 2023-03-23

## 2023-02-21 RX ORDER — IBUPROFEN 800 MG/1
800 TABLET ORAL
COMMUNITY
Start: 2023-01-17 | End: 2023-06-20

## 2023-02-21 RX ORDER — SERTRALINE HYDROCHLORIDE 100 MG/1
150 TABLET, FILM COATED ORAL DAILY
Qty: 30 TABLET | Refills: 2 | Status: SHIPPED | OUTPATIENT
Start: 2023-02-21 | End: 2023-02-28

## 2023-02-21 RX ORDER — ALBUTEROL SULFATE 90 UG/1
AEROSOL, METERED RESPIRATORY (INHALATION)
COMMUNITY
Start: 2023-01-17

## 2023-02-21 RX ORDER — MONTELUKAST SODIUM 10 MG/1
10 TABLET ORAL
COMMUNITY
Start: 2023-01-10 | End: 2023-08-22

## 2023-02-21 ASSESSMENT — FIBROSIS 4 INDEX: FIB4 SCORE: 0.65

## 2023-02-21 ASSESSMENT — PATIENT HEALTH QUESTIONNAIRE - PHQ9: CLINICAL INTERPRETATION OF PHQ2 SCORE: 0
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
## 2023-02-21 NOTE — TELEPHONE ENCOUNTER
NURTEC 75 MG Tbdp    PA had to be called into [facility] CMM could not verify eligibility, Rep Sammie took information PA approved #16/30 DS case ID [id number] - TC paid copay $1065.19 #16/30 DS - EXPIRATION [id number] [place] [id number] ALLOWED MAXIMUM DAY SUPPLY  ALLOWED - notifying liaison Kinga Mcfadden. - 02/21/2023 10:14pm

## 2023-02-22 NOTE — PROGRESS NOTES
[facility]  Psychotherapy Summary Note    Full therapy note has been documented and is under restricted viewing.  Please see below for summary of today's session.     Patient Name: Tesha Mason  Patient MRN: [id number]  Today's Date:     Resident/Fellow providing service: Cherelle Reyes M.D.    Type of session:Individual psychotherapy  Session start time: 3:00pm  Session stop time: 3:30pm  Length of time spent face to face with patient: 30min (telehealth visit)  Persons in attendance:Patient    Diagnoses:   1. ADHD (attention deficit hyperactivity disorder), combined type          Symptoms currently being addressed in therapy: anxiety and interpersonal relationship skills    Therapeutic Intervention(s): Conflict resolution skills and Distress tolerance skills    Medications Reviewed: NO, continue concerta to 54mg ER due to decrease in concentration and impulsivity. Refilled Concerta and Zoloft     Treatment Goal(s)/Objective(s) addressed: interpersonal conflict skills     Progress toward Treatment Goals: Mild improvement in depression symptoms and moderate improvement in anxiety    Plan:      - Continue weekly therapy.    Discussed with supervising attending, Dr. Jayshree MD.    Cherelle Reyes M.D.
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
## 2023-02-24 ENCOUNTER — PHARMACY VISIT (OUTPATIENT)
Dept: PHARMACY | Facility: MEDICAL CENTER | Age: 38
End: 2023-02-24
Payer: COMMERCIAL

## 2023-02-24 PROCEDURE — RXMED WILLOW AMBULATORY MEDICATION CHARGE: Performed by: PSYCHIATRY & NEUROLOGY

## 2023-02-28 ENCOUNTER — TELEMEDICINE (OUTPATIENT)
Dept: BEHAVIORAL HEALTH | Facility: PSYCHIATRIC FACILITY | Age: 38
End: 2023-02-28
Payer: COMMERCIAL

## 2023-02-28 DIAGNOSIS — F90.2 ADHD (ATTENTION DEFICIT HYPERACTIVITY DISORDER), COMBINED TYPE: ICD-10-CM

## 2023-02-28 DIAGNOSIS — F41.1 GENERALIZED ANXIETY DISORDER: ICD-10-CM

## 2023-02-28 PROCEDURE — 90832 PSYTX W PT 30 MINUTES: CPT | Mod: GT | Performed by: STUDENT IN AN ORGANIZED HEALTH CARE EDUCATION/TRAINING PROGRAM

## 2023-02-28 RX ORDER — SERTRALINE HYDROCHLORIDE 100 MG/1
200 TABLET, FILM COATED ORAL DAILY
Qty: 90 TABLET | Refills: 1 | Status: SHIPPED | OUTPATIENT
Start: 2023-02-28 | End: 2023-04-04
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
## 2023-03-07 ENCOUNTER — APPOINTMENT (OUTPATIENT)
Dept: BEHAVIORAL HEALTH | Facility: PSYCHIATRIC FACILITY | Age: 38
End: 2023-03-07
Payer: COMMERCIAL

## 2023-03-07 ENCOUNTER — PHARMACY VISIT (OUTPATIENT)
Dept: PHARMACY | Facility: MEDICAL CENTER | Age: 38
End: 2023-03-07
Payer: COMMERCIAL

## 2023-03-14 ENCOUNTER — OFFICE VISIT (OUTPATIENT)
Dept: BEHAVIORAL HEALTH | Facility: PSYCHIATRIC FACILITY | Age: 38
End: 2023-03-14
Payer: COMMERCIAL

## 2023-03-14 DIAGNOSIS — F43.10 POSTTRAUMATIC STRESS DISORDER: ICD-10-CM

## 2023-03-14 DIAGNOSIS — F41.1 GENERALIZED ANXIETY DISORDER: ICD-10-CM

## 2023-03-14 PROCEDURE — 90832 PSYTX W PT 30 MINUTES: CPT | Mod: 95 | Performed by: STUDENT IN AN ORGANIZED HEALTH CARE EDUCATION/TRAINING PROGRAM
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
## 2023-03-15 NOTE — PROGRESS NOTES
[facility]  Psychotherapy Summary Note    Full therapy note has been documented and is under restricted viewing.  Please see below for summary of today's session.     Patient Name: Tesha Mason  Patient MRN: [id number]  Today's Date:     Resident/Fellow providing service: Cherelle Reyes M.D.    Type of session:Individual psychotherapy  Session start time: 3:00pm  Session stop time: 4:0pm  Length of time spent face to face with patient: 30min (telehealth visit)  Persons in attendance:Patient      Diagnoses:   1. Generalized anxiety disorder        2. Posttraumatic stress disorder              Symptoms currently being addressed in therapy: anxiety and interpersonal relationship skills    Therapeutic Intervention(s): Conflict resolution skills and Distress tolerance skills    Medications Reviewed: yes, tolerated increase of zoloft to 200mg without side effects. Positive effect on mood and decrease in anxiety and panic attacks     Treatment Goal(s)/Objective(s) addressed: interpersonal conflict skills, anxiety     Progress toward Treatment Goals: Mild improvement in depression symptoms and moderate improvement in anxiety    Plan:      - Continue weekly therapy.    Discussed with supervising attending, Dr. Jayshree MD.    Cherelle Reyes M.D.

## 2023-03-21 ENCOUNTER — PHARMACY VISIT (OUTPATIENT)
Dept: PHARMACY | Facility: MEDICAL CENTER | Age: 38
End: 2023-03-21
Payer: COMMERCIAL

## 2023-03-21 ENCOUNTER — APPOINTMENT (OUTPATIENT)
Dept: BEHAVIORAL HEALTH | Facility: PSYCHIATRIC FACILITY | Age: 38
End: 2023-03-21
Payer: COMMERCIAL

## 2023-03-21 PROCEDURE — RXMED WILLOW AMBULATORY MEDICATION CHARGE: Performed by: PSYCHIATRY & NEUROLOGY

## 2023-03-22 ENCOUNTER — DOCUMENTATION (OUTPATIENT)
Dept: PHARMACY | Facility: MEDICAL CENTER | Age: 38
End: 2023-03-22
Payer: COMMERCIAL

## 2023-03-22 DIAGNOSIS — F90.2 ADHD (ATTENTION DEFICIT HYPERACTIVITY DISORDER), COMBINED TYPE: ICD-10-CM

## 2023-03-22 RX ORDER — METHYLPHENIDATE HYDROCHLORIDE 54 MG/1
54 TABLET, EXTENDED RELEASE ORAL EVERY MORNING
Qty: 30 TABLET | Refills: 0 | Status: SHIPPED | OUTPATIENT
Start: 2023-03-22 | End: 2023-04-20

## 2023-03-22 NOTE — PROGRESS NOTES
03/21/23: Spoke with [RIKI]. She is doing well on the [NURTEC 75 MG Tab]. She reports no side effects to the medication and no new allergies. She reports 0 missed doses and has about [3] on hand. Sending delivery for [03/22] via [employer]. Patient stated her next dose for Aimovig 140mg/ml  is 04/09, I advised I will call a week before due date. No questions for [facility]

## 2023-03-28 ENCOUNTER — APPOINTMENT (OUTPATIENT)
Dept: BEHAVIORAL HEALTH | Facility: PSYCHIATRIC FACILITY | Age: 38
End: 2023-03-28
Payer: COMMERCIAL

## 2023-03-31 ENCOUNTER — DOCUMENTATION (OUTPATIENT)
Dept: PHARMACY | Facility: MEDICAL CENTER | Age: 38
End: 2023-03-31
Payer: COMMERCIAL

## 2023-03-31 PROCEDURE — RXMED WILLOW AMBULATORY MEDICATION CHARGE: Performed by: PSYCHIATRY & NEUROLOGY

## 2023-03-31 NOTE — PROGRESS NOTES
3/31/12   REFILL - Contact: Tesha @ [phone]  Patient adherence: Doing well on Aimovig, no missed doses, none on hand. next inj due 4/9 no new allergies, conditions or medications, no side effects, no dose change.   Delivery: [employer] 4/5  Shipping Address: [address] [place] [address]  [facility] [address]  Medication: Aimovig 140mg - inj q 30ds  Copay: $5  Payment Method: [employer] 5179  Supplies: n/a  PT expressed hardship: n/a  Additional Information:   - sross

## 2023-04-04 ENCOUNTER — OFFICE VISIT (OUTPATIENT)
Dept: BEHAVIORAL HEALTH | Facility: PSYCHIATRIC FACILITY | Age: 38
End: 2023-04-04
Payer: COMMERCIAL

## 2023-04-04 ENCOUNTER — PHARMACY VISIT (OUTPATIENT)
Dept: PHARMACY | Facility: MEDICAL CENTER | Age: 38
End: 2023-04-04
Payer: COMMERCIAL

## 2023-04-04 DIAGNOSIS — F41.1 GENERALIZED ANXIETY DISORDER: ICD-10-CM

## 2023-04-04 DIAGNOSIS — F43.10 POSTTRAUMATIC STRESS DISORDER: ICD-10-CM

## 2023-04-04 DIAGNOSIS — F90.2 ADHD (ATTENTION DEFICIT HYPERACTIVITY DISORDER), COMBINED TYPE: ICD-10-CM

## 2023-04-04 PROCEDURE — 90837 PSYTX W PT 60 MINUTES: CPT | Performed by: STUDENT IN AN ORGANIZED HEALTH CARE EDUCATION/TRAINING PROGRAM

## 2023-04-04 RX ORDER — SERTRALINE HYDROCHLORIDE 100 MG/1
200 TABLET, FILM COATED ORAL DAILY
Qty: 120 TABLET | Refills: 1 | Status: SHIPPED | OUTPATIENT
Start: 2023-04-04 | End: 2023-05-09

## 2023-04-05 NOTE — PROGRESS NOTES
[facility]  Psychotherapy Summary Note    Full therapy note has been documented and is under restricted viewing.  Please see below for summary of today's session.     Patient Name: Tesha Mason  Patient MRN: [id number]  Today's Date:     Resident/Fellow providing service: Cherelle Reyes M.D.    Type of session:Individual psychotherapy  Session start time: 3:00pm  Session stop time: 4:0pm  Length of time spent face to face with patient: 30min (telehealth visit)  Persons in attendance:Patient      Diagnoses:   1. ADHD (attention deficit hyperactivity disorder), combined type        2. Generalized anxiety disorder        3. Posttraumatic stress disorder                Symptoms currently being addressed in therapy: anxiety and interpersonal relationship skills    Therapeutic Intervention(s): Conflict resolution skills and Distress tolerance skills    Medications Reviewed: NO, continuing zoloft  200mg. No side effects. Positive effect on mood and decrease in anxiety and panic attacks. Will consider higher dose in future as stepwise improvement seen.     Treatment Goal(s)/Objective(s) addressed: interpersonal conflict skills, anxiety     Progress toward Treatment Goals: Mild improvement in depression symptoms and moderate improvement in anxiety    Plan:      - Continue weekly therapy.    Discussed with supervising attending, Dr. Dario MD.    Cherelle Reyes M.D.
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
## 2023-04-18 ENCOUNTER — DOCUMENTATION (OUTPATIENT)
Dept: PHARMACY | Facility: MEDICAL CENTER | Age: 38
End: 2023-04-18
Payer: COMMERCIAL

## 2023-04-18 DIAGNOSIS — F90.2 ATTENTION DEFICIT HYPERACTIVITY DISORDER (ADHD), COMBINED TYPE: ICD-10-CM

## 2023-04-18 PROCEDURE — RXMED WILLOW AMBULATORY MEDICATION CHARGE: Performed by: PSYCHIATRY & NEUROLOGY

## 2023-04-18 NOTE — PROGRESS NOTES
04/18/23: Spoke with [Tesha]. She is doing well on both the [Aimovig 140mg/mL & Nurtec 75mg tabs]. She stated that she is happy with the medication as it has reduce the frequency of her migraines. She reports no side effects to the medication and no new allergies. She reports 0 missed doses and has about [5 tabs of Nurtec and 0 doses of Aimovig] on hand. She injects Aimovig on the 9th of every month, next dose due 05/09. Arranged to have both medications filled on 04/24 to synchronize the refills. Aimovig is too soon until 04/22 and could not give an actual copay. I informed Tesha that her last copay was $5 and if anything changed I would reach out to her to confirm the charge. Copay $0 for Nurtec. No supplies requested. Sending delivery for [04/25] via [employer], over night.

## 2023-04-20 RX ORDER — METHYLPHENIDATE HYDROCHLORIDE 54 MG/1
54 TABLET ORAL EVERY MORNING
Qty: 30 TABLET | Refills: 0 | Status: SHIPPED | OUTPATIENT
Start: 2023-04-20 | End: 2023-05-09 | Stop reason: SDUPTHER

## 2023-04-24 ENCOUNTER — PHARMACY VISIT (OUTPATIENT)
Dept: PHARMACY | Facility: MEDICAL CENTER | Age: 38
End: 2023-04-24
Payer: COMMERCIAL

## 2023-04-24 PROCEDURE — RXMED WILLOW AMBULATORY MEDICATION CHARGE: Performed by: PSYCHIATRY & NEUROLOGY

## 2023-04-25 ENCOUNTER — OFFICE VISIT (OUTPATIENT)
Dept: BEHAVIORAL HEALTH | Facility: PSYCHIATRIC FACILITY | Age: 38
End: 2023-04-25
Payer: COMMERCIAL

## 2023-04-25 DIAGNOSIS — F90.2 ATTENTION DEFICIT HYPERACTIVITY DISORDER (ADHD), COMBINED TYPE: ICD-10-CM

## 2023-04-25 DIAGNOSIS — F41.1 GENERALIZED ANXIETY DISORDER: ICD-10-CM

## 2023-04-25 PROCEDURE — 90837 PSYTX W PT 60 MINUTES: CPT | Performed by: STUDENT IN AN ORGANIZED HEALTH CARE EDUCATION/TRAINING PROGRAM

## 2023-04-26 NOTE — PSYCHOTHERAPY
" [facility]  Psychotherapy Progress Note    Patient Name: Tesha Mason  Patient MRN: [id number]  Today's Date:     Resident/Fellow providing service: Cherelle Reyes M.D.  Supervising Attending: Latrell Martell MD    Type of session:Medication management with psychotherapy  Session start time: 3:00pm  Session stop time: 4:00pm  Length of time spent face to face with patient: 30min  Persons in attendance:Patient    Subjective/New Info:   Zoloft resolving generalized panic attacks, some occur with driving. Reported panic attacks around interpersonal issues suggest acute stress reaction with \"stomach dropping\" and nausea. She has multiple stressors at home with boyfriend cheating and using manipulation tactics when arguing, attempting to turn her arguments against her \"well you did x first\" and \"you can't keep brining up the past.\" Discussed writing boundaries for interpersonal relationships (friends and lovers) as homework to  be brought next week. Discussed boundaries and to know why we allow people to push ours and to evaluate if we are doing it for the correct reasons. She started cutting last week during difficult converrsation with , discussed using ice and hot mark water for distraction techniques instead. Discussed box breathing and return to guided meditation. No active SI, no passive SI. Some thoughts of leaving her  reflexively.     Moving forward significant patient given to past stressors and conflict skills she has obtained and how to continue positive trajectory.      Stressors:   -- not being seen for who I am  -- failing     Objective/Observations:   Participation: Active verbal participation, Attentive, Engaged, and Open to feedback   Grooming: appropriate   Cognition: Alert   Eye contact: appropriate   Mood: \"tired\"   Affect: Flexible and Full range   Thought process: Logical and Goal-directed   Speech: Rate within normal limits and Volume within normal limits   Other: "     Diagnoses:   1. Attention deficit hyperactivity disorder (ADHD), combined type    2. Generalized anxiety disorder           Current assessment of risk:   SUICIDE: Low   Homicide: Not applicable   Self-harm: Low   Relapse: Not applicable   Other:    Safety Plan reviewed? Yes   If evidence of imminent risk is present, intervention/plan: has safety plan, she gave it to her spouse, bf, and mother    Therapeutic Intervention(s): Distress tolerance skills and Limit-setting    Treatment Goal(s)/Objective(s) addressed: Increasing Zoloft to 200mg daily for residual anxiety, has seen excellent improvement in anxiety symptoms     Progress toward Treatment Goals: Moderate improvement    Plan:      - Continue weekly therapy.    Cherelle Reyes M.D.

## 2023-04-26 NOTE — PROGRESS NOTES
[facility]  Psychotherapy Summary Note    Full therapy note has been documented and is under restricted viewing.  Please see below for summary of today's session.     Patient Name: Tesha Mason  Patient MRN: [id number]  Today's Date:     Resident/Fellow providing service: Cherelle Reyes M.D.    Type of session:Individual psychotherapy  Session start time: 2:00pm  Session stop time: 3:0pm  Length of time spent face to face with patient: 60 minutes  Persons in attendance:Patient      Diagnoses:   1. Attention deficit hyperactivity disorder (ADHD), combined type        2. Generalized anxiety disorder              Symptoms currently being addressed in therapy: anxiety and interpersonal relationship skills    Therapeutic Intervention(s): Conflict resolution skills and Distress tolerance skills    Medications Reviewed: NO, continuing zoloft  200mg. No side effects. Positive effect on mood and decrease in anxiety and panic attacks. Will consider higher dose in future as stepwise improvement seen. Has clear distinction between panic attacks which are almost resolved vs momentary acute stress reaction 2/2 interpersonal conflicts    Treatment Goal(s)/Objective(s) addressed: interpersonal conflict skills, anxiety     Progress toward Treatment Goals: Mild improvement in depression symptoms and moderate improvement in anxiety    Plan:      - Continue weekly therapy.    Discussed with supervising attending, Dr. Jayshree MD.    Cherelle Reyes M.D.

## 2023-05-02 ENCOUNTER — HOSPITAL ENCOUNTER (OUTPATIENT)
Dept: RADIOLOGY | Facility: MEDICAL CENTER | Age: 38
End: 2023-05-02
Payer: COMMERCIAL

## 2023-05-02 ENCOUNTER — HOSPITAL ENCOUNTER (OUTPATIENT)
Facility: MEDICAL CENTER | Age: 38
End: 2023-05-02
Payer: COMMERCIAL

## 2023-05-02 ENCOUNTER — OFFICE VISIT (OUTPATIENT)
Dept: URGENT CARE | Facility: CLINIC | Age: 38
End: 2023-05-02
Payer: COMMERCIAL

## 2023-05-02 ENCOUNTER — APPOINTMENT (OUTPATIENT)
Dept: BEHAVIORAL HEALTH | Facility: PSYCHIATRIC FACILITY | Age: 38
End: 2023-05-02
Payer: COMMERCIAL

## 2023-05-02 VITALS
OXYGEN SATURATION: 99 % | HEIGHT: 59 IN | WEIGHT: 127 LBS | RESPIRATION RATE: 16 BRPM | DIASTOLIC BLOOD PRESSURE: 76 MMHG | BODY MASS INDEX: 25.6 KG/M2 | TEMPERATURE: 97.6 F | HEART RATE: 79 BPM | SYSTOLIC BLOOD PRESSURE: 122 MMHG

## 2023-05-02 DIAGNOSIS — N39.0 UTI (URINARY TRACT INFECTION) DUE TO ENTEROCOCCUS: Primary | ICD-10-CM

## 2023-05-02 DIAGNOSIS — R10.819 ABDOMINAL TENDERNESS IN RIGHT FLANK: ICD-10-CM

## 2023-05-02 DIAGNOSIS — B95.2 UTI (URINARY TRACT INFECTION) DUE TO ENTEROCOCCUS: Primary | ICD-10-CM

## 2023-05-02 LAB
APPEARANCE UR: CLEAR
BILIRUB UR STRIP-MCNC: NEGATIVE MG/DL
COLOR UR AUTO: YELLOW
GLUCOSE UR STRIP.AUTO-MCNC: NEGATIVE MG/DL
KETONES UR STRIP.AUTO-MCNC: NEGATIVE MG/DL
LEUKOCYTE ESTERASE UR QL STRIP.AUTO: NEGATIVE
NITRITE UR QL STRIP.AUTO: NEGATIVE
PH UR STRIP.AUTO: 7 [PH] (ref 5–8)
PROT UR QL STRIP: NEGATIVE MG/DL
RBC UR QL AUTO: NEGATIVE
SP GR UR STRIP.AUTO: 1.02
UROBILINOGEN UR STRIP-MCNC: 0.2 MG/DL

## 2023-05-02 PROCEDURE — 87086 URINE CULTURE/COLONY COUNT: CPT

## 2023-05-02 PROCEDURE — 81002 URINALYSIS NONAUTO W/O SCOPE: CPT

## 2023-05-02 PROCEDURE — 99213 OFFICE O/P EST LOW 20 MIN: CPT

## 2023-05-02 PROCEDURE — 74176 CT ABD & PELVIS W/O CONTRAST: CPT

## 2023-05-02 ASSESSMENT — FIBROSIS 4 INDEX: FIB4 SCORE: 0.65

## 2023-05-02 NOTE — PROGRESS NOTES
Subjective:   Tesha Mason is a 37 y.o. female who presents for Abdominal Pain (Lower abdominal pain , right side pain that travel to back. X 3 days)      HPI:      Patient presents to urgent care with her SO with concerns of RLQ abdominal pain x 3 days.  Patient states that she was cleaning out a fish tank 3 days ago when the pain started.  She states the pain is in the lower right lateral aspect of her abdomen.  Rates pain as an 8 out of 10, is experiencing nausea.  Pain is mostly constant, however she has waves of pain.   Pain radiates to her back  No fever, chills, emesis, diarrhea.  Has history of cystic ovaries.  Denies history of renal stones  Denies decreased appetite, states she is tolerating her diet.  Endorses slight heartburn.   Denies history of constipation, or increased time to pass stool. LBM this morning was brown and formed. Denies melena, mucus.  Denies hematuria, dysuria, urinary frequency, urinary retention. Denies vaginal discharge.  Denies URI symptoms  Denies recent traveling, hospitalization, or use of antibiotics.  Denies use of street drugs. No new medications or changes in medications.    ROS As above in HPI    Medications:    Current Outpatient Medications on File Prior to Visit   Medication Sig Dispense Refill    methylphenidate (CONCERTA) 54 MG CR tablet Take 1 Tablet by mouth every morning for 30 days. 30 Tablet 0    sertraline (ZOLOFT) 100 MG Tab Take 2 Tablets by mouth every day. 120 Tablet 1    albuterol 108 (90 Base) MCG/ACT Aero Soln inhalation aerosol INHALE 2 (TWO) PUFFS INTO THE LUNGS EVERY 4-6 HOURS AS NEEDED      cyclobenzaprine (FLEXERIL) 5 mg tablet Take 5 mg by mouth.      ibuprofen (MOTRIN) 800 MG Tab Take 800 mg by mouth.      montelukast (SINGULAIR) 10 MG Tab Take 10 mg by mouth every day.      Rimegepant Sulfate (NURTEC) 75 MG TABLET DISPERSIBLE Take 1 Tablet by mouth 1 time a day as needed (migraine) for up to 30 days. 8 Tablet 5    Erenumab-aooe (AIMOVIG)  140 MG/ML Solution Auto-injector Inject 140 mg under the skin every 30 days. 1 mL 5    fluticasone (FLONASE) 50 MCG/ACT nasal spray Spray 1 Spray in nose every day.      asa/apap/caffeine (EXCEDRIN) 250-250-65 MG Tab Take 1 Tab by mouth every 6 hours as needed for Headache.      NON SPECIFIED 1 Each by Injection route 2X A WEEK. Allergy shot 2x per week       fexofenadine (ALLEGRA) 180 MG tablet Take 180 mg by mouth Once.       No current facility-administered medications on file prior to visit.        Allergies:   Hydrocodone    Problem List:   Patient Active Problem List   Diagnosis    Asthma    Attention deficit hyperactivity disorder (ADHD)    Posttraumatic stress disorder    Generalized anxiety disorder    Mild episode of recurrent major depressive disorder (HCC)    STD (female)    Peripheral vertigo of both ears    Mild intermittent asthma, uncomplicated        Surgical History:  Past Surgical History:   Procedure Laterality Date    TX LAP,DIAGNOSTIC ABDOMEN  12/5/2019    Procedure: PELVISCOPY;  Surgeon: Celso Burnett M.D.;  Location: SURGERY [facility];  Service: Gynecology    TX REMOVE INTRAUTERINE DEVICE  12/5/2019    Procedure: REMOVAL, INTRAUTERINE DEVICE;  Surgeon: Celso Burnett M.D.;  Location: SURGERY [facility];  Service: Gynecology    SALPINGECTOMY Bilateral 12/5/2019    Procedure: SALPINGECTOMY;  Surgeon: Celso Burnett M.D.;  Location: SURGERY [facility];  Service: Gynecology    GYN SURGERY  2010, 2012    laparoscopy    OTHER      tonsillectomy    OTHER      wisdom teeth removal 2006       Past Social Hx:   Social History     Tobacco Use    Smoking status: Never    Smokeless tobacco: Never   Vaping Use    Vaping Use: Never used   Substance Use Topics    Alcohol use: Yes     Alcohol/week: 0.6 oz     Types: 1 Shots of liquor per week    Drug use: Not Currently     Types: Marijuana     Comment: marijuana 1x per week          Problem list, medications, and allergies  "reviewed by myself today in Epic.     Objective:     /76 (BP Location: Right arm, Patient Position: Sitting, BP Cuff Size: Adult)   Pulse 79   Temp 36.4 °C (97.6 °F) (Temporal)   Resp 16   Ht 1.499 m (4' 11\")   Wt 57.6 kg (127 lb)   SpO2 99%   BMI 25.65 kg/m²     Physical Exam  Vitals and nursing note reviewed.   Constitutional:       General: She is not in acute distress.     Appearance: Normal appearance. She is not ill-appearing or diaphoretic.   HENT:      Head: Normocephalic and atraumatic.      Right Ear: Tympanic membrane and ear canal normal.      Left Ear: Tympanic membrane and ear canal normal.      Nose: Nose normal.      Mouth/Throat:      Mouth: Mucous membranes are moist.      Pharynx: Oropharynx is clear.   Cardiovascular:      Rate and Rhythm: Normal rate and regular rhythm.      Heart sounds: Normal heart sounds. No murmur heard.    No friction rub. No gallop.   Pulmonary:      Effort: Pulmonary effort is normal. No respiratory distress.      Breath sounds: Normal breath sounds. No stridor. No wheezing, rhonchi or rales.   Chest:      Chest wall: No tenderness.   Abdominal:      General: Abdomen is flat. Bowel sounds are normal. There is no distension.      Palpations: Abdomen is soft. There is no hepatomegaly, splenomegaly, mass or pulsatile mass.      Tenderness: There is no abdominal tenderness. There is right CVA tenderness. There is no left CVA tenderness, guarding or rebound. Negative signs include Duran's sign, Rovsing's sign, McBurney's sign and obturator sign.      Hernia: No hernia is present.          Comments: Lower right lateral abdomen is tender to palpation. There is also right CVA tenderness. Negative McBurney's sign, Rovsing's sign. No guarding, rebound.   Musculoskeletal:         General: Normal range of motion.   Skin:     General: Skin is warm and dry.      Capillary Refill: Capillary refill takes less than 2 seconds.      Findings: No rash.   Neurological:      " Mental Status: She is alert and oriented to person, place, and time.       Assessment/Plan:       Results for orders placed or performed in visit on 05/02/23   POCT Urinalysis   Result Value Ref Range    POC Color Yellow Negative    POC Appearance Clear Negative    POC Glucose Negative Negative mg/dL    POC Bilirubin Negative Negative mg/dL    POC Ketones Negative Negative mg/dL    POC Specific Gravity 1.025 <1.005 - >1.030    POC Blood Negative Negative    POC Urine PH 7.0 5.0 - 8.0    POC Protein Negative Negative mg/dL    POC Urobiligen 0.2 Negative (0.2) mg/dL    POC Nitrites Negative Negative    POC Leukocyte Esterase Negative Negative       Diagnosis and associated orders:   1. Abdominal tenderness in right flank  - POCT Urinalysis  - CT-RENAL COLIC EVALUATION(A/P W/O); Future  - URINE CULTURE(NEW); Future        Comments/MDM:     UA: neg nitrites, leukocytes, blood.  Urine culture ordered  Patient has pmh of cystic ovaries. Due to severity of discomfort for 3 days, will obtain advanced imaging. CT renal colic ordered to rule out renal stones, cysts.  Patient instructed to go to ER if her pain, nausea worsen or she becomes febrile.   Patient and her SO verbalized understanding and consented to plan of care.       Please note that this dictation was created using voice recognition software. I have made a reasonable attempt to correct obvious errors, but I expect that there are errors of grammar and possibly content that I did not discover before finalizing the note.    This note was electronically signed by Gia Modi, BRANT

## 2023-05-03 DIAGNOSIS — R10.819 ABDOMINAL TENDERNESS IN RIGHT FLANK: ICD-10-CM

## 2023-05-05 LAB
BACTERIA UR CULT: NORMAL
SIGNIFICANT IND 70042: NORMAL
SITE SITE: NORMAL
SOURCE SOURCE: NORMAL

## 2023-05-09 ENCOUNTER — OFFICE VISIT (OUTPATIENT)
Dept: BEHAVIORAL HEALTH | Facility: PSYCHIATRIC FACILITY | Age: 38
End: 2023-05-09

## 2023-05-09 DIAGNOSIS — F41.1 GENERALIZED ANXIETY DISORDER: ICD-10-CM

## 2023-05-09 DIAGNOSIS — F90.2 ATTENTION DEFICIT HYPERACTIVITY DISORDER (ADHD), COMBINED TYPE: ICD-10-CM

## 2023-05-09 PROCEDURE — 90837 PSYTX W PT 60 MINUTES: CPT | Performed by: STUDENT IN AN ORGANIZED HEALTH CARE EDUCATION/TRAINING PROGRAM

## 2023-05-09 RX ORDER — METHYLPHENIDATE HYDROCHLORIDE 54 MG/1
54 TABLET ORAL EVERY MORNING
Qty: 30 TABLET | Refills: 0 | Status: SHIPPED | OUTPATIENT
Start: 2023-05-09 | End: 2023-06-08

## 2023-05-09 RX ORDER — SERTRALINE HYDROCHLORIDE 100 MG/1
200 TABLET, FILM COATED ORAL DAILY
Qty: 30 TABLET | Refills: 3 | Status: SHIPPED | OUTPATIENT
Start: 2023-05-09 | End: 2023-06-27

## 2023-05-10 NOTE — PSYCHOTHERAPY
" [facility]  Psychotherapy Progress Note    Patient Name: Tesha Mason  Patient MRN: [id number]  Today's Date:     Resident/Fellow providing service: Cherelle Reyes M.D.  Supervising Attending: Latrell Martell MD    Type of session:Medication management with psychotherapy  Session start time: 3:00pm  Session stop time: 4:00pm  Length of time spent face to face with patient: 30min  Persons in attendance:Patient    Subjective/New Info:   Zoloft resolving generalized panic attacks, some occur with driving. Reported panic attacks around interpersonal issues suggest acute stress reaction with \"stomach dropping\" and nausea. She has multiple stressors at home with boyfriend cheating and using manipulation tactics when arguing, attempting to turn her arguments against her \"well you did x first\" and \"you can't keep brining up the past.\" Discussed writing boundaries for interpersonal relationships (friends and lovers) as homework to  be brought next week. Discussed boundaries and to know why we allow people to push ours and to evaluate if we are doing it for the correct reasons. She started cutting last week during difficult converrsation with , discussed using ice and hot mark water for distraction techniques instead. Discussed box breathing and return to guided meditation. No active SI, no passive SI. Some thoughts of leaving her  reflexively.     Moving forward significant patient given to past stressors and conflict skills she has obtained and how to continue positive trajectory.      Stressors:   -- not being seen for who I am  -- failing     Objective/Observations:   Participation: Active verbal participation, Attentive, Engaged, and Open to feedback   Grooming: appropriate   Cognition: Alert   Eye contact: appropriate   Mood: \"tired\"   Affect: Flexible and Full range   Thought process: Logical and Goal-directed   Speech: Rate within normal limits and Volume within normal limits   Other: "     Diagnoses:   1. Generalized anxiety disorder    2. Attention deficit hyperactivity disorder (ADHD), combined type           Current assessment of risk:   SUICIDE: Low   Homicide: Not applicable   Self-harm: Low   Relapse: Not applicable   Other:    Safety Plan reviewed? Yes   If evidence of imminent risk is present, intervention/plan: has safety plan, she gave it to her spouse, bf, and mother    Therapeutic Intervention(s): Distress tolerance skills and Limit-setting    Treatment Goal(s)/Objective(s) addressed: Increasing Zoloft to 200mg daily for residual anxiety, has seen excellent improvement in anxiety symptoms     Progress toward Treatment Goals: Moderate improvement    Plan:      - Continue weekly therapy.    Cherelle Reyes M.D.

## 2023-05-10 NOTE — PROGRESS NOTES
[facility]  Psychotherapy Summary Note    Full therapy note has been documented and is under restricted viewing.  Please see below for summary of today's session.     Patient Name: Tesha Mason  Patient MRN: [id number]  Today's Date:     Resident/Fellow providing service: Cherelle Reyes M.D.    Type of session:Individual psychotherapy  Session start time: 3:00pm  Session stop time: 4:0pm  Length of time spent face to face with patient: 60 minutes  Persons in attendance:Patient      Diagnoses:   1. Generalized anxiety disorder        2. Attention deficit hyperactivity disorder (ADHD), combined type              Symptoms currently being addressed in therapy: anxiety and interpersonal relationship skills    Therapeutic Intervention(s): Conflict resolution skills and Distress tolerance skills    Medications Reviewed: NO, continuing zoloft  200mg. No side effects. Positive effect on mood and decrease in anxiety and panic attacks. Will consider higher dose in future as stepwise improvement seen. Has clear distinction between panic attacks which are almost resolved vs momentary acute stress reaction 2/2 interpersonal conflicts    Treatment Goal(s)/Objective(s) addressed: interpersonal conflict skills, anxiety     Progress toward Treatment Goals: Mild improvement in depression symptoms and moderate improvement in anxiety    Plan:      - Continue weekly therapy.    Discussed with supervising attending, Dr. Jayshree MD.    Cherelle Reyes M.D.

## 2023-05-11 DIAGNOSIS — G43.719 INTRACTABLE CHRONIC MIGRAINE WITHOUT AURA AND WITHOUT STATUS MIGRAINOSUS: ICD-10-CM

## 2023-05-11 PROCEDURE — 1125F AMNT PAIN NOTED PAIN PRSNT: CPT | Performed by: PSYCHIATRY & NEUROLOGY

## 2023-05-15 RX ORDER — RIMEGEPANT SULFATE 75 MG/75MG
75 TABLET, ORALLY DISINTEGRATING ORAL
Qty: 8 TABLET | Refills: 5 | Status: SHIPPED | OUTPATIENT
Start: 2023-05-15 | End: 2024-02-05 | Stop reason: SDUPTHER

## 2023-05-15 RX ORDER — ERENUMAB-AOOE 140 MG/ML
140 INJECTION, SOLUTION SUBCUTANEOUS
Qty: 1 ML | Refills: 5 | Status: SHIPPED | OUTPATIENT
Start: 2023-05-15 | End: 2023-11-24 | Stop reason: SDUPTHER

## 2023-05-16 ENCOUNTER — APPOINTMENT (OUTPATIENT)
Dept: BEHAVIORAL HEALTH | Facility: PSYCHIATRIC FACILITY | Age: 38
End: 2023-05-16

## 2023-05-30 ENCOUNTER — OFFICE VISIT (OUTPATIENT)
Dept: BEHAVIORAL HEALTH | Facility: PSYCHIATRIC FACILITY | Age: 38
End: 2023-05-30
Payer: OTHER MISCELLANEOUS

## 2023-05-30 DIAGNOSIS — F41.1 GENERALIZED ANXIETY DISORDER: ICD-10-CM

## 2023-05-30 DIAGNOSIS — F43.10 PTSD (POST-TRAUMATIC STRESS DISORDER): ICD-10-CM

## 2023-05-30 DIAGNOSIS — F90.2 ADHD (ATTENTION DEFICIT HYPERACTIVITY DISORDER), COMBINED TYPE: ICD-10-CM

## 2023-05-30 PROCEDURE — 90837 PSYTX W PT 60 MINUTES: CPT | Performed by: STUDENT IN AN ORGANIZED HEALTH CARE EDUCATION/TRAINING PROGRAM
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
## 2023-05-31 NOTE — PROGRESS NOTES
[facility]  Psychotherapy Summary Note    Full therapy note has been documented and is under restricted viewing.  Please see below for summary of today's session.     Patient Name: Tesha Mason  Patient MRN: [id number]  Today's Date:     Resident/Fellow providing service: Cherelle Reyes M.D.    Type of session:Individual psychotherapy  Session start time: 3:00pm  Session stop time: 4:0pm  Length of time spent face to face with patient: 60 minutes  Persons in attendance:Patient      Diagnoses:   1. Generalized anxiety disorder        2. ADHD (attention deficit hyperactivity disorder), combined type        3. PTSD (post-traumatic stress disorder)                Symptoms currently being addressed in therapy: anxiety and interpersonal relationship skills    Therapeutic Intervention(s): Conflict resolution skills and Distress tolerance skills    Medications Reviewed: NO, continuing zoloft  200mg. No side effects. Positive effect on mood and decrease in anxiety and panic attacks. Will consider higher dose in future as stepwise improvement seen. Has clear distinction between panic attacks which are almost resolved vs momentary acute stress reaction 2/2 interpersonal conflicts    Treatment Goal(s)/Objective(s) addressed: interpersonal conflict skills, anxiety     Progress toward Treatment Goals: Mild improvement in depression symptoms and moderate improvement in anxiety    Plan:      - Continue weekly therapy.    Discussed with supervising attending, Dr. Jayshree MD.    Cherelle Reyes M.D.

## 2023-06-05 PROCEDURE — RXMED WILLOW AMBULATORY MEDICATION CHARGE: Performed by: PSYCHIATRY & NEUROLOGY

## 2023-06-06 ENCOUNTER — OFFICE VISIT (OUTPATIENT)
Dept: BEHAVIORAL HEALTH | Facility: PSYCHIATRIC FACILITY | Age: 38
End: 2023-06-06
Payer: OTHER MISCELLANEOUS

## 2023-06-06 DIAGNOSIS — F41.1 GENERALIZED ANXIETY DISORDER: ICD-10-CM

## 2023-06-06 DIAGNOSIS — F90.2 ADHD (ATTENTION DEFICIT HYPERACTIVITY DISORDER), COMBINED TYPE: ICD-10-CM

## 2023-06-06 DIAGNOSIS — F43.10 PTSD (POST-TRAUMATIC STRESS DISORDER): ICD-10-CM

## 2023-06-06 PROCEDURE — 90837 PSYTX W PT 60 MINUTES: CPT | Performed by: STUDENT IN AN ORGANIZED HEALTH CARE EDUCATION/TRAINING PROGRAM
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
## 2023-06-07 NOTE — PROGRESS NOTES
[facility]  Psychotherapy Summary Note    Full therapy note has been documented and is under restricted viewing.  Please see below for summary of today's session.     Patient Name: Tesha Mason  Patient MRN: [id number]  Today's Date:     Resident/Fellow providing service: Cherelle Reyes M.D.    Type of session:Individual psychotherapy  Session start time: 3:30pm  Session stop time: 4:0pm  Length of time spent face to face with patient: 60 minutes  Persons in attendance:Patient      Diagnoses:   1. Generalized anxiety disorder        2. ADHD (attention deficit hyperactivity disorder), combined type        3. PTSD (post-traumatic stress disorder)                  Symptoms currently being addressed in therapy: anxiety and interpersonal relationship skills    Therapeutic Intervention(s): Conflict resolution skills and Distress tolerance skills    Medications Reviewed: NO, continuing zoloft  200mg. No side effects. Positive effect on mood and decrease in anxiety and panic attacks. Will consider higher dose in future as stepwise improvement seen. Has clear distinction between panic attacks which are almost resolved vs momentary acute stress reaction 2/2 interpersonal conflicts    Treatment Goal(s)/Objective(s) addressed: interpersonal conflict skills, anxiety     Progress toward Treatment Goals: Mild improvement in depression symptoms and moderate improvement in anxiety    Plan:      - Continue weekly therapy.    Discussed with supervising attending, Dr. Jayshree MD.    Cherelle Reyes M.D.

## 2023-06-13 ENCOUNTER — OFFICE VISIT (OUTPATIENT)
Dept: BEHAVIORAL HEALTH | Facility: PSYCHIATRIC FACILITY | Age: 38
End: 2023-06-13
Payer: OTHER MISCELLANEOUS

## 2023-06-13 DIAGNOSIS — F41.1 GENERALIZED ANXIETY DISORDER: ICD-10-CM

## 2023-06-13 DIAGNOSIS — F43.10 PTSD (POST-TRAUMATIC STRESS DISORDER): ICD-10-CM

## 2023-06-13 DIAGNOSIS — F90.2 ADHD (ATTENTION DEFICIT HYPERACTIVITY DISORDER), COMBINED TYPE: ICD-10-CM

## 2023-06-13 PROCEDURE — 90834 PSYTX W PT 45 MINUTES: CPT | Performed by: STUDENT IN AN ORGANIZED HEALTH CARE EDUCATION/TRAINING PROGRAM
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
## 2023-06-13 NOTE — PROGRESS NOTES
[facility]  Psychotherapy Summary Note    Full therapy note has been documented and is under restricted viewing.  Please see below for summary of today's session.     Patient Name: Tesha Mason  Patient MRN: [id number]  Today's Date:     Resident/Fellow providing service: Cherelle Reyes M.D.    Type of session:Individual psychotherapy  Session start time: 3:05pm  Session stop time: 3:50pm  Length of time spent face to face with patient: 45 minutes  Persons in attendance:Patient      Diagnoses:   1. ADHD (attention deficit hyperactivity disorder), combined type        2. Generalized anxiety disorder        3. PTSD (post-traumatic stress disorder)                    Symptoms currently being addressed in therapy: anxiety and interpersonal relationship skills    Therapeutic Intervention(s): Conflict resolution skills and Distress tolerance skills    Medications Reviewed: NO, continuing zoloft  200mg. No side effects. Positive effect on mood and decrease in anxiety and panic attacks. Will consider higher dose in future as stepwise improvement seen. Has clear distinction between panic attacks which are almost resolved vs momentary acute stress reaction 2/2 interpersonal conflicts    Treatment Goal(s)/Objective(s) addressed: interpersonal conflict skills, anxiety     Progress toward Treatment Goals: Mild improvement in depression symptoms and moderate improvement in anxiety    Plan:      - Continue weekly therapy.    Discussed with supervising attending, Dr. Jayshree MD.    Cherelle Reyes M.D.

## 2023-06-20 ENCOUNTER — OFFICE VISIT (OUTPATIENT)
Dept: BEHAVIORAL HEALTH | Facility: PSYCHIATRIC FACILITY | Age: 38
End: 2023-06-20
Payer: OTHER MISCELLANEOUS

## 2023-06-20 DIAGNOSIS — F90.2 ADHD (ATTENTION DEFICIT HYPERACTIVITY DISORDER), COMBINED TYPE: ICD-10-CM

## 2023-06-20 PROCEDURE — 90834 PSYTX W PT 45 MINUTES: CPT | Performed by: STUDENT IN AN ORGANIZED HEALTH CARE EDUCATION/TRAINING PROGRAM

## 2023-06-20 RX ORDER — METHYLPHENIDATE HYDROCHLORIDE 54 MG/1
54 TABLET ORAL EVERY MORNING
Qty: 30 TABLET | Refills: 0 | Status: SHIPPED | OUTPATIENT
Start: 2023-08-14 | End: 2023-09-05

## 2023-06-20 RX ORDER — METHYLPHENIDATE HYDROCHLORIDE 54 MG/1
54 TABLET ORAL EVERY MORNING
Qty: 30 TABLET | Refills: 0 | Status: SHIPPED | OUTPATIENT
Start: 2023-07-19 | End: 2023-08-18

## 2023-06-20 RX ORDER — METHYLPHENIDATE HYDROCHLORIDE 54 MG/1
54 TABLET ORAL EVERY MORNING
Qty: 30 TABLET | Refills: 0 | Status: SHIPPED | OUTPATIENT
Start: 2023-06-21 | End: 2023-07-21
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
## 2023-06-20 NOTE — PROGRESS NOTES
[facility]  Psychotherapy Summary Note    Full therapy note has been documented and is under restricted viewing.  Please see below for summary of today's session.     Patient Name: Tesha Mason  Patient MRN: [id number]  Today's Date:     Resident/Fellow providing service: Cherelle Reyes M.D.    Type of session:Individual psychotherapy  Session start time: 3:05pm  Session stop time: 3:50pm  Length of time spent face to face with patient: 45 minutes  Persons in attendance:Patient      Diagnoses:   1. ADHD (attention deficit hyperactivity disorder), combined type  methylphenidate (CONCERTA) 54 MG CR tablet    methylphenidate (CONCERTA) 54 MG CR tablet    methylphenidate (CONCERTA) 54 MG CR tablet            Symptoms currently being addressed in therapy: anxiety and interpersonal relationship skills    Therapeutic Intervention(s): Conflict resolution skills and Distress tolerance skills    Medications Reviewed: YES, continuing zoloft  200mg. No side effects. Positive effect on mood and decrease in anxiety and panic attacks. Will consider higher dose in future as stepwise improvement seen. Has clear distinction between panic attacks which are almost resolved vs momentary acute stress reaction 2/2 interpersonal conflicts    Refilled concerta 54mg x3 months post dated through 9/9/23    Treatment Goal(s)/Objective(s) addressed: interpersonal conflict skills, anxiety     Progress toward Treatment Goals: Mild improvement in depression symptoms and moderate improvement in anxiety    Plan:      - Continue weekly therapy.    Discussed with supervising attending, Dr. Jayshree MD.    Cherelle Reyes M.D.

## 2023-06-21 DIAGNOSIS — F41.1 GENERALIZED ANXIETY DISORDER: ICD-10-CM

## 2023-06-21 NOTE — TELEPHONE ENCOUNTER
Received request via: Pharmacy    Was the patient seen in the last year in this department? Yes, lov = 6/20/23    Does the patient have an active prescription (recently filled or refills available) for medication(s) requested?  Will run out of med on 6/23/23, 1 refill remaining    Does the patient have [facility] and need 100 day supply (blood pressure, diabetes and cholesterol meds only)? Patient does not have SCP

## 2023-06-22 ENCOUNTER — APPOINTMENT (OUTPATIENT)
Dept: NEUROLOGY | Facility: MEDICAL CENTER | Age: 38
End: 2023-06-22
Attending: PSYCHIATRY & NEUROLOGY
Payer: OTHER MISCELLANEOUS

## 2023-06-27 ENCOUNTER — OFFICE VISIT (OUTPATIENT)
Dept: BEHAVIORAL HEALTH | Facility: PSYCHIATRIC FACILITY | Age: 38
End: 2023-06-27
Payer: OTHER MISCELLANEOUS

## 2023-06-27 DIAGNOSIS — F41.1 GENERALIZED ANXIETY DISORDER: ICD-10-CM

## 2023-06-27 DIAGNOSIS — F43.10 POSTTRAUMATIC STRESS DISORDER: ICD-10-CM

## 2023-06-27 DIAGNOSIS — F90.2 ADHD (ATTENTION DEFICIT HYPERACTIVITY DISORDER), COMBINED TYPE: ICD-10-CM

## 2023-06-27 PROCEDURE — 90834 PSYTX W PT 45 MINUTES: CPT | Performed by: STUDENT IN AN ORGANIZED HEALTH CARE EDUCATION/TRAINING PROGRAM

## 2023-06-27 RX ORDER — SERTRALINE HYDROCHLORIDE 100 MG/1
200 TABLET, FILM COATED ORAL DAILY
Qty: 30 TABLET | Refills: 3 | Status: SHIPPED | OUTPATIENT
Start: 2023-06-27 | End: 2023-07-12

## 2023-06-28 PROCEDURE — RXMED WILLOW AMBULATORY MEDICATION CHARGE: Performed by: PSYCHIATRY & NEUROLOGY
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
## 2023-06-28 NOTE — PROGRESS NOTES
[facility]  Psychotherapy Summary Note    Full therapy note has been documented and is under restricted viewing.  Please see below for summary of today's session.     Patient Name: Tesha Mason  Patient MRN: [id number]  Today's Date:     Resident/Fellow providing service: Cherelle Reyes M.D.    Type of session:Individual psychotherapy  Session start time: 3:05pm  Session stop time: 3:50pm  Length of time spent face to face with patient: 45 minutes  Persons in attendance:Patient      Diagnoses:   1. Generalized anxiety disorder        2. ADHD (attention deficit hyperactivity disorder), combined type        3. Posttraumatic stress disorder                Symptoms currently being addressed in therapy: anxiety and interpersonal relationship skills    Therapeutic Intervention(s): Conflict resolution skills and Distress tolerance skills    Medications Reviewed: NO, continuing zoloft  200mg. No side effects. Positive effect on mood and decrease in anxiety and panic attacks. Will consider higher dose in future as stepwise improvement seen. Has clear distinction between panic attacks which are almost resolved vs momentary acute stress reaction 2/2 interpersonal conflicts    Refilled concerta 54mg x3 months post dated through 9/9/23    Treatment Goal(s)/Objective(s) addressed: interpersonal conflict skills, anxiety     Progress toward Treatment Goals: Mild improvement in depression symptoms and moderate improvement in anxiety    Plan:      - Continue weekly therapy.    Discussed with supervising attending, Dr. Jayshree MD.    Cherelle Reyes M.D.

## 2023-06-29 ENCOUNTER — PHARMACY VISIT (OUTPATIENT)
Dept: PHARMACY | Facility: MEDICAL CENTER | Age: 38
End: 2023-06-29
Payer: MEDICARE

## 2023-07-07 DIAGNOSIS — F41.1 GENERALIZED ANXIETY DISORDER: ICD-10-CM

## 2023-07-10 ENCOUNTER — TELEPHONE (OUTPATIENT)
Dept: BEHAVIORAL HEALTH | Facility: PSYCHIATRIC FACILITY | Age: 38
End: 2023-07-10
Payer: COMMERCIAL

## 2023-07-10 DIAGNOSIS — F41.1 GENERALIZED ANXIETY DISORDER: ICD-10-CM

## 2023-07-10 RX ORDER — PROPRANOLOL HYDROCHLORIDE 10 MG/1
10 TABLET ORAL 2 TIMES DAILY
Qty: 60 TABLET | Refills: 0 | Status: SHIPPED | OUTPATIENT
Start: 2023-07-10 | End: 2023-07-25

## 2023-07-10 NOTE — TELEPHONE ENCOUNTER
----- Message from Marguerite Meek sent at 7/10/2023  8:09 AM PDT -----  Regarding: PT dawn cancelled, not doing well  Called PT to cancel dawn for 7/11 due to provider being ill. PT stated she is not doing well at all and would like to speak to someone else if possible. CB is [phone]. Thank you!
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
## 2023-07-11 ENCOUNTER — APPOINTMENT (OUTPATIENT)
Dept: BEHAVIORAL HEALTH | Facility: PSYCHIATRIC FACILITY | Age: 38
End: 2023-07-11
Payer: COMMERCIAL

## 2023-07-12 ENCOUNTER — DOCUMENTATION (OUTPATIENT)
Dept: PHARMACY | Facility: MEDICAL CENTER | Age: 38
End: 2023-07-12
Payer: COMMERCIAL

## 2023-07-12 RX ORDER — SERTRALINE HYDROCHLORIDE 100 MG/1
200 TABLET, FILM COATED ORAL DAILY
Qty: 30 TABLET | Refills: 3 | Status: SHIPPED | OUTPATIENT
Start: 2023-07-12 | End: 2023-07-14 | Stop reason: SDUPTHER

## 2023-07-12 NOTE — PROGRESS NOTES
07/11/23: Spoke with Tesha. She is doing well on the Aimovig 140mg/mL; Nurtec 75mg tabs. She reports no side effects to the medication and no new allergies. She reports 0 missed doses and has 0 doses of Aimovig and about 8 tabs of Nurtec on hand. Next dose of Aimovig due 08/09. She has declined a refill of Nurtec at this time and scheduled a follow up call for  08/01.

## 2023-07-14 DIAGNOSIS — F41.1 GENERALIZED ANXIETY DISORDER: ICD-10-CM

## 2023-07-14 RX ORDER — SERTRALINE HYDROCHLORIDE 100 MG/1
200 TABLET, FILM COATED ORAL DAILY
Qty: 180 TABLET | Refills: 0 | Status: SHIPPED | OUTPATIENT
Start: 2023-07-14 | End: 2023-09-26 | Stop reason: SDUPTHER

## 2023-07-14 RX ORDER — SERTRALINE HYDROCHLORIDE 100 MG/1
200 TABLET, FILM COATED ORAL DAILY
Qty: 90 TABLET | Refills: 0 | Status: SHIPPED | OUTPATIENT
Start: 2023-07-14 | End: 2023-07-14 | Stop reason: SDUPTHER

## 2023-07-14 NOTE — PROGRESS NOTES
Fellow, Dr. Melendez. spoke with patient who was here with daughter for appointment. Patient needing 90 day fill instead of 30 day for insurance.    Called pharmacy who will inactivate other sertraline prescriptions..   Sam King MD

## 2023-07-18 ENCOUNTER — OFFICE VISIT (OUTPATIENT)
Dept: BEHAVIORAL HEALTH | Facility: PSYCHIATRIC FACILITY | Age: 38
End: 2023-07-18
Payer: OTHER MISCELLANEOUS

## 2023-07-18 DIAGNOSIS — F41.1 GENERALIZED ANXIETY DISORDER: ICD-10-CM

## 2023-07-18 DIAGNOSIS — F43.10 POSTTRAUMATIC STRESS DISORDER: ICD-10-CM

## 2023-07-18 PROCEDURE — 90834 PSYTX W PT 45 MINUTES: CPT | Performed by: STUDENT IN AN ORGANIZED HEALTH CARE EDUCATION/TRAINING PROGRAM

## 2023-07-19 NOTE — PROGRESS NOTES
[facility]  Psychotherapy Summary Note    Full therapy note has been documented and is under restricted viewing.  Please see below for summary of today's session.     Patient Name: Tesha Mason  Patient MRN: [id number]  Today's Date:     Resident/Fellow providing service: Cherelle Reyes M.D.    Type of session:Individual psychotherapy  Session start time: 3:05pm  Session stop time: 3:50pm  Length of time spent face to face with patient: 45 minutes  Persons in attendance:Patient      Diagnoses:   1. Generalized anxiety disorder        2. Posttraumatic stress disorder                  Symptoms currently being addressed in therapy: anxiety and interpersonal relationship skills    Therapeutic Intervention(s): Conflict resolution skills and Distress tolerance skills    Medications Reviewed: NO, continuing zoloft  200mg. No side effects. Positive effect on mood and decrease in anxiety and panic attacks. Will consider higher dose in future as stepwise improvement seen. Has clear distinction between panic attacks which are almost resolved vs momentary acute stress reaction 2/2 interpersonal conflicts    Refilled concerta 54mg x3 months post dated through 9/9/23    Treatment Goal(s)/Objective(s) addressed: interpersonal conflict skills, anxiety     Progress toward Treatment Goals: Moderate improvement in depression symptoms and moderate improvement in anxiety    Plan:      - Continue weekly therapy.    Discussed with supervising attending, Dr. Jayshree MD.    Cherelle Reyes M.D.
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
## 2023-07-25 ENCOUNTER — OFFICE VISIT (OUTPATIENT)
Dept: BEHAVIORAL HEALTH | Facility: PSYCHIATRIC FACILITY | Age: 38
End: 2023-07-25
Payer: OTHER MISCELLANEOUS

## 2023-07-25 DIAGNOSIS — F43.10 POSTTRAUMATIC STRESS DISORDER: ICD-10-CM

## 2023-07-25 DIAGNOSIS — F41.1 GENERALIZED ANXIETY DISORDER: ICD-10-CM

## 2023-07-25 PROCEDURE — 90834 PSYTX W PT 45 MINUTES: CPT | Performed by: STUDENT IN AN ORGANIZED HEALTH CARE EDUCATION/TRAINING PROGRAM

## 2023-07-25 RX ORDER — GUANFACINE 1 MG/1
1 TABLET ORAL 2 TIMES DAILY
Qty: 60 TABLET | Refills: 1 | Status: SHIPPED | OUTPATIENT
Start: 2023-07-25 | End: 2023-08-22

## 2023-07-26 NOTE — PROGRESS NOTES
[facility]  Psychotherapy Summary Note    Full therapy note has been documented and is under restricted viewing.  Please see below for summary of today's session.     Patient Name: Tesha Mason  Patient MRN: [id number]  Today's Date:     Resident/Fellow providing service: Cherelle Reyes M.D.    Type of session:Individual psychotherapy  Session start time: 3:05pm  Session stop time: 3:50pm  Length of time spent face to face with patient: 45 minutes  Persons in attendance:Patient      Diagnoses:   1. Generalized anxiety disorder        2. Posttraumatic stress disorder                    Symptoms currently being addressed in therapy: anxiety and interpersonal relationship skills    Therapeutic Intervention(s): Conflict resolution skills and Distress tolerance skills    Medications Reviewed: Yes, continuing zoloft  200mg. No side effects. Positive effect on mood and decrease in anxiety and panic attacks. Will consider higher dose in future as stepwise improvement seen. Has clear distinction between panic attacks which are almost resolved vs momentary acute stress reaction 2/2 interpersonal conflicts. Exacerbation of anxiety and panic attacks due to severe psychosocial events. DC propanolol and started guanfacine .5mg BID for 1 week with plan to increase to 1mg BID for anxiety, impulsivity, and ADHD.     Refilled concerta 54mg x3 months post dated through 9/9/23    Treatment Goal(s)/Objective(s) addressed: interpersonal conflict skills, anxiety     Progress toward Treatment Goals: Moderate improvement in depression symptoms and moderate improvement in anxiety    Plan:      - Continue weekly therapy.    Discussed with supervising attending, Dr. Jayshree MD.    Cherelle Reyes M.D.

## 2023-08-01 ENCOUNTER — OFFICE VISIT (OUTPATIENT)
Dept: BEHAVIORAL HEALTH | Facility: PSYCHIATRIC FACILITY | Age: 38
End: 2023-08-01
Payer: OTHER MISCELLANEOUS

## 2023-08-01 ENCOUNTER — DOCUMENTATION (OUTPATIENT)
Dept: PHARMACY | Facility: MEDICAL CENTER | Age: 38
End: 2023-08-01
Payer: COMMERCIAL

## 2023-08-01 DIAGNOSIS — F43.10 POSTTRAUMATIC STRESS DISORDER: ICD-10-CM

## 2023-08-01 DIAGNOSIS — F41.1 GENERALIZED ANXIETY DISORDER: ICD-10-CM

## 2023-08-01 DIAGNOSIS — F90.2 ADHD (ATTENTION DEFICIT HYPERACTIVITY DISORDER), COMBINED TYPE: ICD-10-CM

## 2023-08-01 PROCEDURE — RXMED WILLOW AMBULATORY MEDICATION CHARGE: Performed by: PSYCHIATRY & NEUROLOGY

## 2023-08-01 PROCEDURE — 90834 PSYTX W PT 45 MINUTES: CPT | Performed by: STUDENT IN AN ORGANIZED HEALTH CARE EDUCATION/TRAINING PROGRAM

## 2023-08-02 ENCOUNTER — PHARMACY VISIT (OUTPATIENT)
Dept: PHARMACY | Facility: MEDICAL CENTER | Age: 38
End: 2023-08-02
Payer: MEDICARE
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
## 2023-08-02 NOTE — PROGRESS NOTES
08/01/23: Spoke with Tesha, she will be leaving out of town from 08/04-08/12. She is doing well on the Aimovig 140mg/mL; Nurtec 75mg tabs. She reports no side effects to the medication and no new allergies. She reports 0 missed doses and has 0 doses of Aimovig (next due 08/09) and about 8 tabs of Nurtec on hand. She has declined a refill of Nurtec at this time. Sending delivery for 08/03 via [employer], overnight. Okay to charge $5 copay to [facility].

## 2023-08-02 NOTE — PROGRESS NOTES
[facility]  Psychotherapy Summary Note    Full therapy note has been documented and is under restricted viewing.  Please see below for summary of today's session.     Patient Name: Tesha Mason  Patient MRN: [id number]  Today's Date:     Resident/Fellow providing service: Cherelle Reyes M.D.    Type of session:Individual psychotherapy  Session start time: 3:05pm  Session stop time: 3:50pm  Length of time spent face to face with patient: 45 minutes  Persons in attendance:Patient      Diagnoses:   1. Generalized anxiety disorder        2. ADHD (attention deficit hyperactivity disorder), combined type        3. Posttraumatic stress disorder                      Symptoms currently being addressed in therapy: anxiety and interpersonal relationship skills    Therapeutic Intervention(s): Conflict resolution skills and Distress tolerance skills    Medications Reviewed: YES, continuing zoloft  200mg. No side effects. Positive effect on mood and decrease in anxiety and panic attacks. Will consider higher dose in future as stepwise improvement seen. Has clear distinction between panic attacks which are almost resolved vs momentary acute stress reaction 2/2 interpersonal conflicts. Exacerbation of anxiety and panic attacks due to severe psychosocial events. Dc'd propranolol 7/25,  and started guanfacine .5mg BID for 1 week, toleratig without side effects. Increasing to 1mg qhs and .5mg qam for anxiety, impulsivity, and ADHD.     Refilled concerta 54mg x3 months post dated through 9/9/23    Treatment Goal(s)/Objective(s) addressed: interpersonal conflict skills, anxiety     Progress toward Treatment Goals: Moderate improvement in depression symptoms and moderate improvement in anxiety    Plan:      - Continue weekly therapy.    Discussed with supervising attending, Dr. Jayshree MD.    Cherelle Reyes M.D.

## 2023-08-15 ENCOUNTER — OFFICE VISIT (OUTPATIENT)
Dept: BEHAVIORAL HEALTH | Facility: PSYCHIATRIC FACILITY | Age: 38
End: 2023-08-15
Payer: OTHER MISCELLANEOUS

## 2023-08-15 VITALS — SYSTOLIC BLOOD PRESSURE: 121 MMHG | HEART RATE: 80 BPM | DIASTOLIC BLOOD PRESSURE: 74 MMHG

## 2023-08-15 DIAGNOSIS — F90.2 ADHD (ATTENTION DEFICIT HYPERACTIVITY DISORDER), COMBINED TYPE: ICD-10-CM

## 2023-08-15 DIAGNOSIS — F43.10 PTSD (POST-TRAUMATIC STRESS DISORDER): ICD-10-CM

## 2023-08-15 DIAGNOSIS — F41.1 GENERALIZED ANXIETY DISORDER: ICD-10-CM

## 2023-08-15 PROCEDURE — 3074F SYST BP LT 130 MM HG: CPT | Performed by: STUDENT IN AN ORGANIZED HEALTH CARE EDUCATION/TRAINING PROGRAM

## 2023-08-15 PROCEDURE — 90834 PSYTX W PT 45 MINUTES: CPT | Performed by: STUDENT IN AN ORGANIZED HEALTH CARE EDUCATION/TRAINING PROGRAM

## 2023-08-15 PROCEDURE — 3078F DIAST BP <80 MM HG: CPT | Performed by: STUDENT IN AN ORGANIZED HEALTH CARE EDUCATION/TRAINING PROGRAM

## 2023-08-16 NOTE — TELEPHONE ENCOUNTER
Received request via: Pharmacy    Was the patient seen in the last year in this department? Yes, lov = 8/15/23    Does the patient have an active prescription (recently filled or refills available) for medication(s) requested?  Will run out of med on 8/25/23, 1 refill    Does the patient have skilled nursing Plus and need 100 day supply (blood pressure, diabetes and cholesterol meds only)? Patient does not have SCP
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
## 2023-08-16 NOTE — PROGRESS NOTES
[facility]  Psychotherapy Summary Note    Full therapy note has been documented and is under restricted viewing.  Please see below for summary of today's session.     Patient Name: Tesha Mason  Patient MRN: [id number]  Today's Date:     Resident/Fellow providing service: Cherelle Reyes M.D.    Type of session:Individual psychotherapy  Session start time: 3:05pm  Session stop time: 4:10pm  Length of time spent face to face with patient: 45 minutes  Persons in attendance:Patient      Diagnoses:   1. Generalized anxiety disorder        2. ADHD (attention deficit hyperactivity disorder), combined type        3. PTSD (post-traumatic stress disorder)                  Symptoms currently being addressed in therapy: anxiety and interpersonal relationship skills    Therapeutic Intervention(s): Conflict resolution skills and Distress tolerance skills    Medications Reviewed: YES, she had episode of hypotension after taking nurtec for migraine.  told her to DC all medications. She stopped guanfacine and zoloft 200mg qam. She is now having significant SSRI withdrawal symptoms. Discussed restarted 50mg daily and increasing every 2-3 days by 50mg as tolerated to baseline 200mg dose. Discussed continuing .5mg quanfacine qhs for sleep and irritability which she would like to stay on if possible as it has been highly effective in anxiety, impulsivity, and aggressiveness. She will discuss options besides nurtec with neurologist for abortive treatment of migraines. She has phone call scheduled with neuro pharmacist this afternoon. Offered to have  attend session to discuss medications as he has concerns of poly pharmacy and sedation.     Refilled concerta 54mg x3 months post dated through 9/9/23    Treatment Goal(s)/Objective(s) addressed: interpersonal conflict skills, anxiety     Progress toward Treatment Goals: Moderate improvement in depression symptoms and moderate improvement in anxiety    Plan:      [facility] [place], [place]    Name: Katelin Lopez  YOB: 1956      Assessment/Plan:    Stage 3 chronic kidney disease [facility])  Lab Results   Component Value Date    EGFR 51 07/01/2022    EGFR 54 01/31/2022    EGFR 60 12/24/2021    CREATININE 1 13 07/01/2022    CREATININE 1 09 01/31/2022    CREATININE 0 99 12/24/2021       Patient's kidney function is essentially stable with a creatinine around 1 1 mg/ dL  Continue avoid potential nephrotoxins and optimize overall care  Type 2 diabetes mellitus with hyperglycemia, with long-term current use of insulin ([address] )    Continue medications according to our Endocrinology colleagues  Patient continues to try to improve diet and reduce carbohydrates, after review of endocrinology notes, it was noted that her insulin regimen was increased which the patient has not been following  Patient agrees her insulin up to 68 units with breakfast and supper and 36 units with lunch  She was asked to contact her endocrinologist regarding additional adjustments to her insulin given that her blood sugars remained fairly uncontrolled at this time  Lab Results   Component Value Date    HGBA1C 9 4 (H) 07/01/2022       Essential hypertension  Blood pressures are well controlled at this time, continue current medications  Leg swelling    Continue to encourage low-sodium diet, and is on furosemide 20 mg as needed which appears to work and increase urination when she takes it  Problem List Items Addressed This Visit        Endocrine    Type 2 diabetes mellitus with hyperglycemia, with long-term current use of insulin ([address] )       Continue medications according to our Endocrinology colleagues  Patient continues to try to improve diet and reduce carbohydrates, after review of endocrinology notes, it was noted that her insulin regimen was increased which the patient has not been following    Patient agrees her insulin  - Continue weekly therapy.    Discussed with supervising attending, Dr. Jayshree MD.    Cherelle Reyes M.D.       up to 68 units with breakfast and supper and 36 units with lunch  She was asked to contact her endocrinologist regarding additional adjustments to her insulin given that her blood sugars remained fairly uncontrolled at this time  Lab Results   Component Value Date    HGBA1C 9 4 (H) 07/01/2022              Relevant Medications    NovoLOG Mix 70/30 FlexPen (70-30) 100 units/mL injection pen       Cardiovascular and Mediastinum    Essential hypertension (Chronic)     Blood pressures are well controlled at this time, continue current medications  Genitourinary    Stage 3 chronic kidney disease ([facility] [address] ) - Primary (Chronic)     Lab Results   Component Value Date    EGFR 51 07/01/2022    EGFR 54 01/31/2022    EGFR 60 12/24/2021    CREATININE 1 13 07/01/2022    CREATININE 1 09 01/31/2022    CREATININE 0 99 12/24/2021       Patient's kidney function is essentially stable with a creatinine around 1 1 mg/ dL  Continue avoid potential nephrotoxins and optimize overall care  Relevant Orders    Microalbumin / creatinine urine ratio    Urinalysis with microscopic    Comprehensive metabolic panel    CBC and differential    PTH, intact    Phosphorus    Magnesium       Other    Vitamin D deficiency (Chronic)    Leg swelling       Continue to encourage low-sodium diet, and is on furosemide 20 mg as needed which appears to work and increase urination when she takes it  Patient is stable from the renal standpoint  We will check labs prior to next appointment in about 6 months  We discussed blood glucose levels, which remain elevated  I asked her to increase insulin by 4 units with each dose, now up to 68 units with breakfast and supper and 36 units with lunch  Subjective:      Patient ID: Marialuisa Arcos is a 63 y o  female  Patient presents for follow-up appointment  Since we last saw the patient, she had a illness with COVID -19, this was in December 2021   At that time she felt very tired fatigued and was sickest she has been in some time  Patient was hospitalized at which time she was placed on the mild COVID- 19 protocol medication regimen including use of IV steroids as well as remdesivir  Patient was initially on oxygen via nasal cannula but was taken off of oxygen after a few days of treatment  Since that time, the patient has received 2 doses of the COVID vaccine  Patient is taking all medications as prescribed with no specific side effects at this time  Hypertension  This is a chronic problem  The current episode started more than 1 year ago  The problem is unchanged  The problem is controlled  Associated symptoms include peripheral edema  Pertinent negatives include no chest pain, orthopnea or shortness of breath  There are no associated agents to hypertension  Risk factors for coronary artery disease include diabetes mellitus, obesity, post-menopausal state and sedentary lifestyle  Past treatments include ACE inhibitors, lifestyle changes and diuretics  There are no compliance problems  Hypertensive end-organ damage includes kidney disease  Identifiable causes of hypertension include chronic renal disease  The following portions of the patient's history were reviewed and updated as appropriate: allergies, current medications, past family history, past medical history, past social history, past surgical history and problem list     Review of Systems   Respiratory: Negative for shortness of breath  Cardiovascular: Negative for chest pain and orthopnea  All other systems reviewed and are negative  Social History     Socioeconomic History    Marital status:       Spouse name: None    Number of children: None    Years of education: None    Highest education level: None   Occupational History    None   Tobacco Use    Smoking status: Never Smoker    Smokeless tobacco: Never Used   Vaping Use    Vaping Use: Never used   Substance and Sexual Activity    Alcohol use: Not Currently     Comment: OCCASIONALLY    Drug use: Yes     Types: Marijuana     Comment: CBD, medical marijuana    Sexual activity: Never   Other Topics Concern    None   Social History Narrative    None     Social Determinants of Health     Financial Resource Strain: Not on file   Food Insecurity: No Food Insecurity    Worried About Running Out of Food in the Last Year: Never true    Maggie of Food in the Last Year: Never true   Transportation Needs: No Transportation Needs    Lack of Transportation (Medical): No    Lack of Transportation (Non-Medical):  No   Physical Activity: Not on file   Stress: Not on file   Social Connections: Not on file   Intimate Partner Violence: Not on file   Housing Stability: Low Risk     Unable to Pay for Housing in the Last Year: No    Number of Places Lived in the Last Year: 1    Unstable Housing in the Last Year: No     Past Medical History:   Diagnosis Date    Anesthesia related hyperthermia     pt states she had high fevers after her lipoma surgery in  at our hospital and was shipped to [facility] where they said t was from an anesthesia med    Anxiety     Arthritis     Asthma     Chronic pain     Depression     Diabetes mellitus ([facility] [address] )     GERD (gastroesophageal reflux disease)     Hyperlipidemia     Malignant hyperthermia due to anesthesia     Malignant hypothermia due to anesthesia     Neuropathy     Obesity     PCOS (polycystic ovarian syndrome)      Past Surgical History:   Procedure Laterality Date    CERVICAL CONIZATION   W/ LASER       SECTION      DILATION AND CURETTAGE OF UTERUS      ENDOMETRIAL ABLATION      ENDOMETRIAL BIOPSY      EPIDURAL BLOCK INJECTION Bilateral 2019    Procedure: L5-S1 transforaminal epidural steroid injection;  Surgeon: Arie Sloan MD;  Location: [facility] MAIN OR;  Service: Pain Management     EPIDURAL BLOCK INJECTION Bilateral 2019    Procedure: L5-S1 transforaminal epidural steroid injection;  Surgeon: Soham Retana MD;  Location: [facility];  Service: Pain Management     FL GUIDED NEEDLE PLAC BX/ASP/INJ  2019    FL GUIDED NEEDLE PLAC BX/ASP/INJ  2019    FOOT SURGERY Left     HERNIA REPAIR      INDUCED       IR PICC LINE  2019    JOINT REPLACEMENT      KNEE ARTHROPLASTY Right     KS DEBRIDEMENT OPEN WOUND 20 SQ CM< Right 2018    Procedure: DEBRIDEMENT HAND/FINGER [facility] OUT); Surgeon: Sheila Vargas MD;  Location: [facility] MAIN OR;  Service: General    KS EXCISION TUMOR SOFT TISSUE BACK/FLANK SUBQ 3+CM N/A 2018    Procedure: BACK LIPOMA EXCISION;  Surgeon: Sheila Vargas MD;  Location: [facility] MAIN OR;  Service: General    ROTATOR CUFF REPAIR Right     TONSILLECTOMY         Current Outpatient Medications:     ammonium lactate (LAC-HYDRIN) 12 % lotion, , Disp: , Rfl: 0    aspirin (ECOTRIN LOW STRENGTH) 81 mg EC tablet, Take 81 mg by mouth daily, Disp: , Rfl:     BD INSULIN SYRINGE U/F 31G X 5/16" 1 ML MISC, , Disp: , Rfl: 0    Blood Glucose Monitoring Suppl (OneTouch Verio) w/Device KIT, Use to test blood sugar 3x daily  , Disp: 1 kit, Rfl: 0    Dulaglutide 3 MG/0 5ML SOPN, Inject 0 5 mL (3 mg total) under the skin once a week, Disp: 6 mL, Rfl: 1    ergocalciferol (ERGOCALCIFEROL) 1 25 MG (26691 UT) capsule, Take 1 capsule (50,000 Units total) by mouth once a week, Disp: 4 capsule, Rfl: 5    fluconazole (DIFLUCAN) 150 mg tablet, take 1 tablet by mouth AS A ONE TIME DOSE, Disp: , Rfl:     furosemide (LASIX) 20 mg tablet, Take 20 mg by mouth daily as needed, Disp: , Rfl:     gabapentin (NEURONTIN) 800 mg tablet, Take 800 mg by mouth 3 (three) times a day, Disp: , Rfl:     glucagon 1 MG injection, Inject 1 mg under the skin once as needed (PRN blood glucose less than 70 if unconscious or uncorrectable by oral means) for up to 1 dose, Disp: 1 kit, Rfl: 0    hydrocortisone 0 5 % cream, Apply topically 2 (two) times a day, Disp: 30 g, Rfl: 0    hydrocortisone-aloe 0 5 % cream, apply topically to affected area twice a day, Disp: , Rfl:     insulin NPH-insulin regular (HumuLIN 70/30) 100 units/mL subcutaneous injection, Inject 68 units before breakfast, 36 units before lunch, and 68 units prior to dinner, Disp: 50 mL, Rfl: 5    Insulin Pen Needle (Pen Needles) 32G X 4 MM MISC, Use 3 (three) times a day, Disp: 100 each, Rfl: 2    Insulin Syringe-Needle U-100 (BD Insulin Syringe U/F) 31G X 5/16" 1 ML MISC, Use three times daily for insulin injection  , Disp: 100 each, Rfl: 3    Lancets (OneTouch Delica Plus [id number]) MISC, Use to test blood sugar 3x daily  , Disp: 100 each, Rfl: 3    Lancets (OneTouch Delica Plus [id number]) MISC, USE TO TEST BLOOD SUGAR THREE TIMES DAILY, Disp: 100 each, Rfl: 3    lidocaine (LIDODERM) 5 %, Apply 1 patch topically daily Remove & Discard patch within 12 hours or as directed by MD, Disp: 10 patch, Rfl: 0    meclizine (ANTIVERT) 25 mg tablet, Take 25 mg by mouth Three times daily as needed, Disp: , Rfl:     metFORMIN (GLUCOPHAGE) 1000 MG tablet, Take 1,000 mg by mouth 2 (two) times a day with meals, Disp: , Rfl:     NovoLOG Mix 70/30 FlexPen (70-30) 100 units/mL injection pen, Take 66 units with breakfast, 27 units with lunch, 66 units with supper, Disp: 15 mL, Rfl: 0    ondansetron (ZOFRAN-ODT) 4 mg disintegrating tablet, Take 1 tablet (4 mg total) by mouth every 6 (six) hours as needed for nausea or vomiting, Disp: 12 tablet, Rfl: 0    triamcinolone (KENALOG) 0 1 % cream, apply topically to affected area twice a day, Disp: , Rfl:     carisoprodol (SOMA) 350 mg tablet, Take 1 tablet (350 mg total) by mouth 3 (three) times a day for 10 days, Disp: 30 tablet, Rfl: 0    glucose blood (OneTouch Verio) test strip, Use to test blood sugar 3x daily  , Disp: 200 each, Rfl: 2    lisinopril (ZESTRIL) 10 mg tablet, Take 10 mg by mouth daily, Disp: , Rfl:     lisinopril (ZESTRIL) 5 mg tablet, Take 2 tablets (10 mg total) by mouth daily, Disp: , Rfl:     nystatin (MYCOSTATIN) cream, Apply topically 2 (two) times a day, Disp: 30 g, Rfl: 0    Lab Results   Component Value Date    SODIUM 137 07/01/2022    K 4 5 07/01/2022     07/01/2022    CO2 25 07/01/2022    AGAP 8 07/01/2022    BUN 21 07/01/2022    CREATININE 1 13 07/01/2022    GLUC 163 (H) 07/01/2022    GLUF 145 (H) 01/31/2022    CALCIUM 9 5 07/01/2022    AST 46 (H) 07/01/2022    ALT 61 07/01/2022    ALKPHOS 74 07/01/2022    TP 7 4 07/01/2022    TBILI 0 52 07/01/2022    EGFR 51 07/01/2022     Lab Results   Component Value Date    WBC 4 91 12/24/2021    HGB 14 7 12/24/2021    HCT 44 4 12/24/2021    MCV 84 12/24/2021     12/24/2021     Lab Results   Component Value Date    CHOLESTEROL 143 01/31/2022    CHOLESTEROL 118 05/06/2020    CHOLESTEROL 187 12/05/2019     Lab Results   Component Value Date    HDL 46 (L) 01/31/2022    HDL 43 05/06/2020    HDL 46 12/05/2019     Lab Results   Component Value Date    LDLCALC 45 01/31/2022    LDLCALC 34 05/06/2020    LDLCALC 102 (H) 12/05/2019     Lab Results   Component Value Date    TRIG 259 (H) 01/31/2022    TRIG 205 (H) 05/06/2020    TRIG 197 (H) 12/05/2019     No results found for: [place], [place]  Lab Results   Component Value Date    ZCO5CLOACNOB 2 120 11/10/2020     Lab Results   Component Value Date    PTH 32 4 01/31/2022    CALCIUM 9 5 07/01/2022    PHOS 4 2 (H) 10/18/2019     Lab Results   Component Value Date    SPEP See Comment 02/17/2021     No results found for: DUSTIN LEY        Objective:      /76   Pulse (!) 108   Ht 5' 2" (1 575 m)   Wt 131 kg (288 lb 3 2 oz)   SpO2 96%   BMI 52 71 kg/m²          Physical Exam  Vitals reviewed  Constitutional:       General: She is not in acute distress  Appearance: She is well-developed  HENT:      Head: Normocephalic and atraumatic     Eyes:      Conjunctiva/sclera: Conjunctivae normal    Cardiovascular:      Rate and Rhythm: Normal rate and regular rhythm  Pulmonary:      Effort: Pulmonary effort is normal       Breath sounds: Normal breath sounds  Abdominal:      Palpations: Abdomen is soft  Musculoskeletal:      Cervical back: Neck supple  Right lower leg: Edema present  Left lower leg: Edema present  Skin:     General: Skin is warm  Findings: No rash  Neurological:      Mental Status: She is alert and oriented to person, place, and time  Cranial Nerves: No cranial nerve deficit     Psychiatric:         Behavior: Behavior normal

## 2023-08-18 ENCOUNTER — DOCUMENTATION (OUTPATIENT)
Dept: PHARMACY | Facility: MEDICAL CENTER | Age: 38
End: 2023-08-18
Payer: COMMERCIAL

## 2023-08-21 NOTE — PROGRESS NOTES
PHARMACIST FOLLOW UP - Follow Up Assessment   Dx: IIntractable chronic migraine without aura and without status migrainosus [G43.719]    Tx prescribed:  Aimovig 140mg/ml solution in autoinjector  SC every 30 days    - Administration:    Aimovig 140mg/ml 1 pen SC into upper outer arm every month on the 9th  Adherence: Was recently late dosing this month by 2 days d/t vacation but was able to make up dose upon her return    - Missed dose mgmt: no need for review, patient made up dose appropriately and confirmed her next dose will be next mon on the same numerical day.   Current SE: none   - Mitigation/Mgmt: n/a      List Changes to Allergies, Diagnoses: none  Current S/Sx: slight improvements in her migraines from baseline but shared her HA has been increasing a little recently. (see additional for more details)      How many migraine days in the past month? 2       Pain severity: varying, sometimes       Avg duration of migraine in past month: 8-10 hrs     Clinically Relevant, Abnormal Labs:  None     Wellness/Lifestyle Counseling:    - Support/QOL: doing ok   - Diet/exercise: trying to increase her level of activity with biking/exercise.   - Immunizations: deferred**   - Triggers: deferred**    Med Rec/Updated drug list: EMR inaccurate, medication changes reviewed with patient/caregiver: (new/discontinued meds, dose/frequency of admin changes)    (+) Vitamin B12 oral solution 5000 mcg po daily   (+) Magnesium 500mg daily      DI Check:      - Cat C fexofenidine + guanfacine = additive/increased CNS depression, continue to monitor  Goals of Therapy:     - To reduce migraine frequency, duration, and severity   - To improve acute medication responsiveness and reduce need for acute attacks   - To identify and modify migraine triggers    Progression toward goals - continuing to see adequate control of migraines. Currently not using Nurtec d/t recent reaction but will consult with providers on next steps. Review of  triggers deferred     Patient has agreed/understands to goals of therapy during education/counseling       Additional:  Tesha reached out to clinical team for routine check in on Aimovig therapy. Had a migraine on 8/6 and took nurtec but experienced a drop in her BP. After additional questioning she clarified she did not have a home BP cuff or had any way to truly evaluate BP.  Shared her HR felt slow and she was trying to catch her breath. She tried to relax, take deep breaths and her vertigo increased as a result. I asked her to check her Apple watch for her HR and she was able to pull the following info: from  8/11 - 17 ave HR 74, on 8/11 HR drop to 66, on 8/14 a spike in HR to 81. She's been afraid to take the Nurtec and has been using Excedrin PM instead. She had recently started Guanfacine on 7/25.     [facility] discussion/intervention: I had advised Tesha there are no known interactions between Nurtec and Guanfacine; however a drop in BP is a known SE of guanfacine alone. I had advised without a BP reading it's hard to assess if what she experienced was hypotension but would want to continue to monitor the situation regardless. I had advised her I would reach out to Dr. Love as well for his input to which she was thankful. Will continue to follow closely on migraine control d/t recent worsening.

## 2023-08-22 ENCOUNTER — OFFICE VISIT (OUTPATIENT)
Dept: BEHAVIORAL HEALTH | Facility: PSYCHIATRIC FACILITY | Age: 38
End: 2023-08-22
Payer: OTHER MISCELLANEOUS

## 2023-08-22 DIAGNOSIS — F43.10 POSTTRAUMATIC STRESS DISORDER: ICD-10-CM

## 2023-08-22 DIAGNOSIS — F90.2 ADHD (ATTENTION DEFICIT HYPERACTIVITY DISORDER), COMBINED TYPE: ICD-10-CM

## 2023-08-22 DIAGNOSIS — F41.1 GENERALIZED ANXIETY DISORDER: ICD-10-CM

## 2023-08-22 PROCEDURE — 90834 PSYTX W PT 45 MINUTES: CPT | Performed by: STUDENT IN AN ORGANIZED HEALTH CARE EDUCATION/TRAINING PROGRAM

## 2023-08-22 RX ORDER — GUANFACINE 1 MG/1
1 TABLET ORAL 2 TIMES DAILY
Qty: 60 TABLET | Refills: 1 | Status: SHIPPED | OUTPATIENT
Start: 2023-08-22 | End: 2023-09-19

## 2023-08-22 NOTE — PROGRESS NOTES
[facility]  Psychotherapy Summary Note    Full therapy note has been documented and is under restricted viewing.  Please see below for summary of today's session.     Patient Name: Tesha Mason  Patient MRN: [id number]  Today's Date:     Resident/Fellow providing service: Cherelle Reyes M.D.    Type of session:Individual psychotherapy  Session start time: 3:05pm  Session stop time: 4:10pm  Length of time spent face to face with patient: 45 minutes  Persons in attendance:Patient      Diagnoses:   1. ADHD (attention deficit hyperactivity disorder), combined type        2. Generalized anxiety disorder        3. Posttraumatic stress disorder                    Symptoms currently being addressed in therapy: anxiety and interpersonal relationship skills    Therapeutic Intervention(s): Conflict resolution skills and Distress tolerance skills    Medications Reviewed: YES, she had episode of hypotension after taking nurtec for migraine.  told her to DC all medications. restarted 50mg daily and increasing every 2-3 days by 50mg as tolerated to baseline 200mg dose. Today she is at 100mg daily with plan to increase to 150mg today.    Discussed continuing .5mg quanfacine qhs for sleep and irritability which she would like to stay on if possible as it has been highly effective in anxiety, impulsivity, and aggressiveness. She will discuss options besides nurtec with neurologist for abortive treatment of migraines. She has phone call scheduled with neuro pharmacist this afternoon.     Refilled concerta 54mg x3 months post dated through 9/9/23    Treatment Goal(s)/Objective(s) addressed: interpersonal conflict skills, anxiety     Progress toward Treatment Goals: Moderate improvement in depression symptoms and moderate improvement in anxiety    Plan:      - Continue weekly therapy.    Discussed with supervising attending, Dr. Dario MD.    Cherelle Reyes M.D.
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
## 2023-08-25 NOTE — PROGRESS NOTES
Reached out to Dr. Love regarding Tesha's reported concerns with most recent Nurtec use. Advised she is to continue Nurtec at this time. Reviewed with Tesha (8/25/23) and she was agreeable to plan, aware to reach out to office for any continued concerns.

## 2023-08-28 ENCOUNTER — TELEPHONE (OUTPATIENT)
Dept: PHARMACY | Facility: MEDICAL CENTER | Age: 38
End: 2023-08-28
Payer: COMMERCIAL

## 2023-08-28 PROCEDURE — RXMED WILLOW AMBULATORY MEDICATION CHARGE: Performed by: PSYCHIATRY & NEUROLOGY

## 2023-08-28 NOTE — TELEPHONE ENCOUNTER
Contact:      Phone number: [phone], Mobile    Name of person spoken with and relationship to patient: Tesha Mason, Self   Patient’s Adherence:      How patient is doing on medication: Good    How many missed doses and reason: 0    Any new medications: No    Any new conditions: No    Any new allergies: No    Any new side effects: No    Any new diagnoses: No    How many doses remainin    Did patient want to speak with pharmacist: No  Delivery:      Delivery date and method:  via UPS, overnight    Needs by Date:     Signature required: No    Any additional details for : N/A  Teach Appointment Date: 2022  Shipping Address: [address] [place] [address]  Medication (name, strength and dose): Aimovig 140mg/mL-140mg q30d  Copay: $5  Payment Method: [employer]   Supplies: None  Additional Information: She has declined a refill of Nurtec at this time and will follow up during the next Aimovig refill. Next call date: 10/02.

## 2023-08-29 ENCOUNTER — OFFICE VISIT (OUTPATIENT)
Dept: BEHAVIORAL HEALTH | Facility: PSYCHIATRIC FACILITY | Age: 38
End: 2023-08-29
Payer: OTHER MISCELLANEOUS

## 2023-08-29 DIAGNOSIS — F41.1 GENERALIZED ANXIETY DISORDER: ICD-10-CM

## 2023-08-29 DIAGNOSIS — F43.10 PTSD (POST-TRAUMATIC STRESS DISORDER): ICD-10-CM

## 2023-08-29 DIAGNOSIS — F90.2 ADHD (ATTENTION DEFICIT HYPERACTIVITY DISORDER), COMBINED TYPE: ICD-10-CM

## 2023-08-29 PROCEDURE — 90834 PSYTX W PT 45 MINUTES: CPT | Performed by: STUDENT IN AN ORGANIZED HEALTH CARE EDUCATION/TRAINING PROGRAM

## 2023-08-29 NOTE — PROGRESS NOTES
[facility]  Psychotherapy Summary Note    Full therapy note has been documented and is under restricted viewing.  Please see below for summary of today's session.     Patient Name: Tesha Mason  Patient MRN: [id number]  Today's Date:     Resident/Fellow providing service: Cherelle Reyes M.D.    Type of session:Individual psychotherapy  Session start time: 3:05pm  Session stop time: 4:10pm  Length of time spent face to face with patient: 45 minutes  Persons in attendance:Patient      Diagnoses:   1. ADHD (attention deficit hyperactivity disorder), combined type        2. PTSD (post-traumatic stress disorder)        3. Generalized anxiety disorder                      Symptoms currently being addressed in therapy: anxiety and interpersonal relationship skills    Therapeutic Intervention(s): Conflict resolution skills and Distress tolerance skills    Medications Reviewed: NO,   continuing .5mg quanfacine qhs for sleep and irritability which she would like to stay on if possible as it has been highly effective in anxiety, impulsivity, and aggressiveness. She will discuss options besides nurtec with neurologist for abortive treatment of migraines. She has phone call scheduled with neuro pharmacist     Refilled concerta 54mg x3 months post dated through 9/9/23    Treatment Goal(s)/Objective(s) addressed: interpersonal conflict skills, anxiety     Progress toward Treatment Goals: Moderate improvement in depression symptoms and moderate improvement in anxiety    Plan:      - Continue weekly therapy.    Discussed with supervising attending, Dr. Dario MD.    Cherelle Reyes M.D.

## 2023-08-30 ENCOUNTER — PHARMACY VISIT (OUTPATIENT)
Dept: PHARMACY | Facility: MEDICAL CENTER | Age: 38
End: 2023-08-30
Payer: MEDICARE
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
## 2023-09-05 ENCOUNTER — OFFICE VISIT (OUTPATIENT)
Dept: BEHAVIORAL HEALTH | Facility: PSYCHIATRIC FACILITY | Age: 38
End: 2023-09-05
Payer: OTHER MISCELLANEOUS

## 2023-09-05 DIAGNOSIS — F41.1 GENERALIZED ANXIETY DISORDER: ICD-10-CM

## 2023-09-05 DIAGNOSIS — F43.10 PTSD (POST-TRAUMATIC STRESS DISORDER): ICD-10-CM

## 2023-09-05 DIAGNOSIS — F90.2 ADHD (ATTENTION DEFICIT HYPERACTIVITY DISORDER), COMBINED TYPE: ICD-10-CM

## 2023-09-05 PROCEDURE — 90834 PSYTX W PT 45 MINUTES: CPT | Performed by: STUDENT IN AN ORGANIZED HEALTH CARE EDUCATION/TRAINING PROGRAM

## 2023-09-05 RX ORDER — LISDEXAMFETAMINE DIMESYLATE 60 MG/1
60 TABLET, CHEWABLE ORAL EVERY MORNING
Qty: 30 TABLET | Refills: 0 | Status: SHIPPED | OUTPATIENT
Start: 2023-09-05 | End: 2023-10-05

## 2023-09-05 NOTE — PROGRESS NOTES
[facility]  Psychotherapy Summary Note    Full therapy note has been documented and is under restricted viewing.  Please see below for summary of today's session.     Patient Name: Tesha Mason  Patient MRN: [id number]  Today's Date:     Resident/Fellow providing service: Cherelle Reyes M.D.    Type of session:Individual psychotherapy  Session start time: 3:05pm  Session stop time: 4:10pm  Length of time spent face to face with patient: 45 minutes  Persons in attendance:Patient      Diagnoses:   1. ADHD (attention deficit hyperactivity disorder), combined type  Lisdexamfetamine Dimesylate (VYVANSE) 60 MG Chew Tab      2. PTSD (post-traumatic stress disorder)        3. Generalized anxiety disorder                  Symptoms currently being addressed in therapy: anxiety and interpersonal relationship skills    Therapeutic Intervention(s): Conflict resolution skills and Distress tolerance skills    Medications Reviewed: YES,   continuing .5mg quanfacine qhs for sleep and irritability which she would like to stay on if possible as it has been highly effective in anxiety, impulsivity, and aggressiveness. She will discuss options besides nurtec with neurologist for abortive treatment of migraines. She has phone call scheduled with neuro pharmacist     Discontinue Concerta due to residual executive dysfunctioning.  Discussed Vyvanse slow titration.  Started Vyvanse 30 mg daily 9/6/2023    Treatment Goal(s)/Objective(s) addressed: interpersonal conflict skills, anxiety     Progress toward Treatment Goals: Moderate improvement in depression symptoms and moderate improvement in anxiety    Plan:      - Continue weekly therapy.    Discussed with supervising attending, Dr. Jayshree MD.    Cherelle Reyes M.D.
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
## 2023-09-12 ENCOUNTER — OFFICE VISIT (OUTPATIENT)
Dept: BEHAVIORAL HEALTH | Facility: PSYCHIATRIC FACILITY | Age: 38
End: 2023-09-12
Payer: OTHER MISCELLANEOUS

## 2023-09-12 DIAGNOSIS — F41.1 GENERALIZED ANXIETY DISORDER: ICD-10-CM

## 2023-09-12 DIAGNOSIS — F90.2 ADHD (ATTENTION DEFICIT HYPERACTIVITY DISORDER), COMBINED TYPE: ICD-10-CM

## 2023-09-12 PROCEDURE — 90834 PSYTX W PT 45 MINUTES: CPT | Performed by: STUDENT IN AN ORGANIZED HEALTH CARE EDUCATION/TRAINING PROGRAM

## 2023-09-13 NOTE — PROGRESS NOTES
[facility]  Psychotherapy Summary Note    Full therapy note has been documented and is under restricted viewing.  Please see below for summary of today's session.     Patient Name: Tesha Mason  Patient MRN: [id number]  Today's Date:     Resident/Fellow providing service: Cherelle Reyes M.D.    Type of session:Individual psychotherapy  Session start time: 3:05pm  Session stop time: 4:10pm  Length of time spent face to face with patient: 45 minutes  Persons in attendance:Patient      Diagnoses:   1. ADHD (attention deficit hyperactivity disorder), combined type        2. Generalized anxiety disorder                    Symptoms currently being addressed in therapy: anxiety and interpersonal relationship skills    Therapeutic Intervention(s): Conflict resolution skills and Distress tolerance skills    Medications Reviewed: NO,   continuing .5mg quanfacine qhs for sleep and irritability which she would like to stay on if possible as it has been highly effective in anxiety, impulsivity, and aggressiveness. She will discuss options besides nurtec with neurologist for abortive treatment of migraines. She has phone call scheduled with neuro pharmacist     Discontinue Concerta due to residual executive dysfunctioning.  Discussed Vyvanse slow titration.  Started Vyvanse 30 mg daily 9/6/2023    Treatment Goal(s)/Objective(s) addressed: interpersonal conflict skills, anxiety     Progress toward Treatment Goals: Moderate improvement in depression symptoms and moderate improvement in anxiety    Plan:      - Continue weekly therapy.    Discussed with supervising attending, Dr. Jayshree MD.    Cherelle Reyes M.D.
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
## 2023-09-19 ENCOUNTER — OFFICE VISIT (OUTPATIENT)
Dept: BEHAVIORAL HEALTH | Facility: PSYCHIATRIC FACILITY | Age: 38
End: 2023-09-19
Payer: OTHER MISCELLANEOUS

## 2023-09-19 VITALS
DIASTOLIC BLOOD PRESSURE: 70 MMHG | HEART RATE: 85 BPM | BODY MASS INDEX: 25.4 KG/M2 | OXYGEN SATURATION: 96 % | HEIGHT: 59 IN | SYSTOLIC BLOOD PRESSURE: 111 MMHG | WEIGHT: 126 LBS

## 2023-09-19 DIAGNOSIS — F90.2 ADHD (ATTENTION DEFICIT HYPERACTIVITY DISORDER), COMBINED TYPE: ICD-10-CM

## 2023-09-19 DIAGNOSIS — F41.1 GENERALIZED ANXIETY DISORDER: ICD-10-CM

## 2023-09-19 DIAGNOSIS — F43.10 PTSD (POST-TRAUMATIC STRESS DISORDER): ICD-10-CM

## 2023-09-19 PROCEDURE — 3078F DIAST BP <80 MM HG: CPT | Performed by: STUDENT IN AN ORGANIZED HEALTH CARE EDUCATION/TRAINING PROGRAM

## 2023-09-19 PROCEDURE — 3074F SYST BP LT 130 MM HG: CPT | Performed by: STUDENT IN AN ORGANIZED HEALTH CARE EDUCATION/TRAINING PROGRAM

## 2023-09-19 PROCEDURE — 90834 PSYTX W PT 45 MINUTES: CPT | Performed by: STUDENT IN AN ORGANIZED HEALTH CARE EDUCATION/TRAINING PROGRAM

## 2023-09-19 ASSESSMENT — FIBROSIS 4 INDEX: FIB4 SCORE: 0.65

## 2023-09-19 NOTE — PROGRESS NOTES
[facility]  Psychotherapy Summary Note    Full therapy note has been documented and is under restricted viewing.  Please see below for summary of today's session.     Patient Name: Tesha Mason  Patient MRN: [id number]  Today's Date:     Resident/Fellow providing service: Cherelle Reyes M.D.    Type of session:Individual psychotherapy  Session start time: 3:05pm  Session stop time: 3:50pm  Length of time spent face to face with patient: 45 minutes  Persons in attendance:Patient      Diagnoses:   1. ADHD (attention deficit hyperactivity disorder), combined type        2. Generalized anxiety disorder        3. PTSD (post-traumatic stress disorder)                      Symptoms currently being addressed in therapy: anxiety and interpersonal relationship skills    Therapeutic Intervention(s): Conflict resolution skills and Distress tolerance skills    Medications Reviewed: NO,   continuing .5mg quanfacine qhs for sleep and irritability which she would like to stay on if possible as it has been highly effective in anxiety, impulsivity, and aggressiveness. She will discuss options besides nurtec with neurologist for abortive treatment of migraines. She has phone call scheduled with neuro pharmacist     Discontinue Concerta due to residual executive dysfunctioning.  Discussed Vyvanse slow titration.  Started Vyvanse 30 mg daily 9/6/2023    Treatment Goal(s)/Objective(s) addressed: interpersonal conflict skills, anxiety     Progress toward Treatment Goals: Moderate improvement in depression symptoms and moderate improvement in anxiety    Plan:      - Continue weekly therapy.    Discussed with supervising attending, Dr. Jayshree MD.    Cherelle Reyes M.D.
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
## 2023-09-26 ENCOUNTER — OFFICE VISIT (OUTPATIENT)
Dept: BEHAVIORAL HEALTH | Facility: PSYCHIATRIC FACILITY | Age: 38
End: 2023-09-26
Payer: OTHER MISCELLANEOUS

## 2023-09-26 DIAGNOSIS — F90.2 ADHD (ATTENTION DEFICIT HYPERACTIVITY DISORDER), COMBINED TYPE: ICD-10-CM

## 2023-09-26 DIAGNOSIS — F41.1 GENERALIZED ANXIETY DISORDER: ICD-10-CM

## 2023-09-26 DIAGNOSIS — F43.10 PTSD (POST-TRAUMATIC STRESS DISORDER): ICD-10-CM

## 2023-09-26 PROCEDURE — 90834 PSYTX W PT 45 MINUTES: CPT | Performed by: STUDENT IN AN ORGANIZED HEALTH CARE EDUCATION/TRAINING PROGRAM

## 2023-09-26 RX ORDER — SERTRALINE HYDROCHLORIDE 100 MG/1
200 TABLET, FILM COATED ORAL DAILY
Qty: 180 TABLET | Refills: 0 | Status: SHIPPED | OUTPATIENT
Start: 2023-09-26 | End: 2023-10-10 | Stop reason: SDUPTHER
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
## 2023-09-27 NOTE — PROGRESS NOTES
[facility]  Psychotherapy Summary Note    Full therapy note has been documented and is under restricted viewing.  Please see below for summary of today's session.     Patient Name: Tesha Mason  Patient MRN: [id number]  Today's Date:     Resident/Fellow providing service: Cherelle Reyes M.D.    Type of session:Individual psychotherapy  Session start time: 3:05pm  Session stop time: 3:50pm  Length of time spent face to face with patient: 45 minutes  Persons in attendance:Patient      Diagnoses:   1. Generalized anxiety disorder  sertraline (ZOLOFT) 100 MG Tab      2. ADHD (attention deficit hyperactivity disorder), combined type        3. PTSD (post-traumatic stress disorder)                  Symptoms currently being addressed in therapy: anxiety and interpersonal relationship skills    Therapeutic Intervention(s): Conflict resolution skills and Distress tolerance skills    Medications Reviewed: NO,   continuing .5mg quanfacine qhs for sleep and irritability which she would like to stay on if possible as it has been highly effective in anxiety, impulsivity, and aggressiveness. She will discuss options besides nurtec with neurologist for abortive treatment of migraines. She has phone call scheduled with neuro pharmacist     Discontinue Concerta due to residual executive dysfunctioning.  Discussed Vyvanse slow titration.  Started Vyvanse 30 mg daily 9/6/2023    Treatment Goal(s)/Objective(s) addressed: interpersonal conflict skills, anxiety     Progress toward Treatment Goals: Moderate improvement in depression symptoms and moderate improvement in anxiety    Plan:      - Continue weekly therapy.    Discussed with supervising attending, Dr. Jayshree MD.    Cherelle Reyes M.D.

## 2023-10-02 ENCOUNTER — TELEPHONE (OUTPATIENT)
Dept: PHARMACY | Facility: MEDICAL CENTER | Age: 38
End: 2023-10-02
Payer: COMMERCIAL

## 2023-10-02 PROCEDURE — RXMED WILLOW AMBULATORY MEDICATION CHARGE: Performed by: PSYCHIATRY & NEUROLOGY

## 2023-10-03 ENCOUNTER — APPOINTMENT (OUTPATIENT)
Dept: BEHAVIORAL HEALTH | Facility: PSYCHIATRIC FACILITY | Age: 38
End: 2023-10-03
Payer: COMMERCIAL

## 2023-10-03 ENCOUNTER — PHARMACY VISIT (OUTPATIENT)
Dept: PHARMACY | Facility: MEDICAL CENTER | Age: 38
End: 2023-10-03
Payer: MEDICARE

## 2023-10-03 NOTE — TELEPHONE ENCOUNTER
Contact:      Phone number: [phone], Mobile    Name of person spoken with and relationship to patient: Tesha Mason, Self   Patient’s Adherence:      How patient is doing on medication: Good    How many missed doses and reason: 0    Any new medications: No    Any new conditions: No    Any new allergies: No    Any new side effects: No    Any new diagnoses: No    How many doses remainin-Aimovig & 6 tabs-Nurtec    Did patient want to speak with pharmacist: No  Delivery:      Delivery date and method: 10/04 via UPS, overnight    Needs by Date: 10/09    Signature required: No    Any additional details for : N/A   Teach Appointment Date: 22 & 23  Shipping Address: [address] [place], [place] [id number]  Medication (name, strength and dose): Aimovig 140mg/mL, Nurtec 75mg tabs  Copay: $5  Payment Method: [employer]   Supplies: None  Additional Information: Next call date: .

## 2023-10-10 ENCOUNTER — OFFICE VISIT (OUTPATIENT)
Dept: BEHAVIORAL HEALTH | Facility: PSYCHIATRIC FACILITY | Age: 38
End: 2023-10-10
Payer: OTHER MISCELLANEOUS

## 2023-10-10 DIAGNOSIS — F41.1 GENERALIZED ANXIETY DISORDER: ICD-10-CM

## 2023-10-10 DIAGNOSIS — F90.2 ADHD (ATTENTION DEFICIT HYPERACTIVITY DISORDER), COMBINED TYPE: ICD-10-CM

## 2023-10-10 PROCEDURE — 90834 PSYTX W PT 45 MINUTES: CPT | Performed by: STUDENT IN AN ORGANIZED HEALTH CARE EDUCATION/TRAINING PROGRAM

## 2023-10-10 RX ORDER — GUANFACINE 1 MG/1
1 TABLET ORAL
Qty: 90 TABLET | Refills: 1 | Status: SHIPPED | OUTPATIENT
Start: 2023-10-10 | End: 2024-01-02 | Stop reason: SDUPTHER

## 2023-10-10 RX ORDER — LISDEXAMFETAMINE DIMESYLATE CAPSULES 60 MG/1
60 CAPSULE ORAL DAILY
Qty: 30 CAPSULE | Refills: 0 | Status: SHIPPED | OUTPATIENT
Start: 2023-10-10 | End: 2023-11-07 | Stop reason: SDUPTHER

## 2023-10-10 RX ORDER — SERTRALINE HYDROCHLORIDE 100 MG/1
200 TABLET, FILM COATED ORAL DAILY
Qty: 180 TABLET | Refills: 0 | Status: SHIPPED | OUTPATIENT
Start: 2023-10-10 | End: 2024-01-02 | Stop reason: SDUPTHER
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
## 2023-10-10 NOTE — PROGRESS NOTES
[facility]  Psychotherapy Summary Note    Full therapy note has been documented and is under restricted viewing.  Please see below for summary of today's session.     Patient Name: Tesha Mason  Patient MRN: [id number]  Today's Date:     Resident/Fellow providing service: Cherelle Reyes M.D.    Type of session:Individual psychotherapy  Session start time: 3:05pm  Session stop time: 3:50pm  Length of time spent face to face with patient: 45 minutes  Persons in attendance:Patient      Diagnoses:   1. Generalized anxiety disorder  sertraline (ZOLOFT) 100 MG Tab      2. ADHD (attention deficit hyperactivity disorder), combined type  Lisdexamfetamine Dimesylate (VYVANSE) 60 MG Cap                Symptoms currently being addressed in therapy: anxiety and interpersonal relationship skills    Therapeutic Intervention(s): Conflict resolution skills and Distress tolerance skills    Medications Reviewed: NO,   continuing .5mg quanfacine qhs for sleep and irritability which she would like to stay on if possible as it has been highly effective in anxiety, impulsivity, and aggressiveness. She will discuss options besides nurtec with neurologist for abortive treatment of migraines. She has phone call scheduled with neuro pharmacist     Discontinue Concerta due to residual executive dysfunctioning.  Started vyvanse 9/6, titrated to 60mg daily 9/13    Treatment Goal(s)/Objective(s) addressed: interpersonal conflict skills, anxiety     Progress toward Treatment Goals: Moderate improvement in depression symptoms and moderate improvement in anxiety    Plan:      - Continue weekly therapy.    Discussed with supervising attending, Dr. Jayshree MD.    Cherelle Reyes M.D.

## 2023-10-17 ENCOUNTER — OFFICE VISIT (OUTPATIENT)
Dept: BEHAVIORAL HEALTH | Facility: PSYCHIATRIC FACILITY | Age: 38
End: 2023-10-17
Payer: OTHER MISCELLANEOUS

## 2023-10-17 DIAGNOSIS — F41.1 GENERALIZED ANXIETY DISORDER: ICD-10-CM

## 2023-10-17 DIAGNOSIS — F90.2 ADHD (ATTENTION DEFICIT HYPERACTIVITY DISORDER), COMBINED TYPE: Primary | ICD-10-CM

## 2023-10-17 DIAGNOSIS — F43.10 PTSD (POST-TRAUMATIC STRESS DISORDER): ICD-10-CM

## 2023-10-17 PROCEDURE — 90834 PSYTX W PT 45 MINUTES: CPT | Performed by: STUDENT IN AN ORGANIZED HEALTH CARE EDUCATION/TRAINING PROGRAM
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
## 2023-10-17 NOTE — PROGRESS NOTES
[facility]  Psychotherapy Summary Note    Full therapy note has been documented and is under restricted viewing.  Please see below for summary of today's session.     Patient Name: Tesha Mason  Patient MRN: [id number]   Today's Date:     Resident/Fellow providing service: Cherelle Reyes M.D.    Type of session:Individual psychotherapy  Session start time: 3:05pm  Session stop time: 3:50pm  Length of time spent face to face with patient: 45 minutes  Persons in attendance:Patient      Diagnoses:   1. ADHD (attention deficit hyperactivity disorder), combined type        2. Generalized anxiety disorder        3. PTSD (post-traumatic stress disorder)                    Symptoms currently being addressed in therapy: anxiety and interpersonal relationship skills    Therapeutic Intervention(s): Conflict resolution skills and Distress tolerance skills    Medications Reviewed: NO,   continuing .5mg quanfacine qhs for sleep and irritability which she would like to stay on if possible as it has been highly effective in anxiety, impulsivity, and aggressiveness. She will discuss options besides nurtec with neurologist for abortive treatment of migraines. She has phone call scheduled with neuro pharmacist     Discontinue Concerta due to residual executive dysfunctioning.  Started vyvanse 9/6, titrated to 60mg daily 9/13    Treatment Goal(s)/Objective(s) addressed: interpersonal conflict skills, anxiety     Progress toward Treatment Goals: Moderate improvement in depression symptoms and moderate improvement in anxiety    Plan:      - Continue weekly therapy.    Discussed with supervising attending, Dr. Dario MD.    Cherelle Reyes M.D.

## 2023-10-31 ENCOUNTER — APPOINTMENT (OUTPATIENT)
Dept: BEHAVIORAL HEALTH | Facility: PSYCHIATRIC FACILITY | Age: 38
End: 2023-10-31
Payer: COMMERCIAL

## 2023-10-31 ENCOUNTER — TELEPHONE (OUTPATIENT)
Dept: PHARMACY | Facility: MEDICAL CENTER | Age: 38
End: 2023-10-31
Payer: COMMERCIAL

## 2023-10-31 PROCEDURE — RXMED WILLOW AMBULATORY MEDICATION CHARGE: Performed by: PSYCHIATRY & NEUROLOGY

## 2023-11-01 NOTE — TELEPHONE ENCOUNTER
Contact:      Phone number: [phone], Mobile    Name of person spoken with and relationship to patient: Tesha Mason, Self  Patient’s Adherence:      How patient is doing on medication: Good    How many missed doses and reason: 0    Any new medications: No    Any new conditions: No    Any new allergies: No    Any new side effects: No    Any new diagnoses: No    How many doses remainin-Aimovig, 10 tabs-Nurtec    Did patient want to speak with pharmacist: No  Delivery:      Delivery date and method:  via UPS, overnight    Needs by Date: -Aimovig    Signature required: No    Any additional details for : N/A  Teach Appointment Date: 22 & 23  Shipping Address: [address] [place] [address]  Medication (name, strength and dose): Aimovig 140mg/mL, Nurtec 75mg tabs  Copay: $5  Payment Method: [employer]  Supplies: None  Additional Information: Next call date: .

## 2023-11-02 ENCOUNTER — PHARMACY VISIT (OUTPATIENT)
Dept: PHARMACY | Facility: MEDICAL CENTER | Age: 38
End: 2023-11-02
Payer: MEDICARE

## 2023-11-07 ENCOUNTER — OFFICE VISIT (OUTPATIENT)
Dept: BEHAVIORAL HEALTH | Facility: PSYCHIATRIC FACILITY | Age: 38
End: 2023-11-07
Payer: COMMERCIAL

## 2023-11-07 DIAGNOSIS — F43.10 PTSD (POST-TRAUMATIC STRESS DISORDER): ICD-10-CM

## 2023-11-07 DIAGNOSIS — F90.2 ADHD (ATTENTION DEFICIT HYPERACTIVITY DISORDER), COMBINED TYPE: Primary | ICD-10-CM

## 2023-11-07 DIAGNOSIS — F41.1 GENERALIZED ANXIETY DISORDER: ICD-10-CM

## 2023-11-07 PROCEDURE — 90834 PSYTX W PT 45 MINUTES: CPT | Performed by: STUDENT IN AN ORGANIZED HEALTH CARE EDUCATION/TRAINING PROGRAM

## 2023-11-07 RX ORDER — LISDEXAMFETAMINE DIMESYLATE CAPSULES 60 MG/1
60 CAPSULE ORAL DAILY
Qty: 30 CAPSULE | Refills: 0 | Status: SHIPPED | OUTPATIENT
Start: 2023-11-07 | End: 2023-12-05 | Stop reason: DRUGHIGH

## 2023-11-08 NOTE — PROGRESS NOTES
[facility]  Psychotherapy Summary Note    Full therapy note has been documented and is under restricted viewing.  Please see below for summary of today's session.     Patient Name: Tesha Mason  Patient MRN: [id number]   Today's Date:     Resident/Fellow providing service: Cherelle Reyes M.D.    Type of session:Individual psychotherapy  Session start time: 3:05pm  Session stop time: 3:50pm  Length of time spent face to face with patient: 45 minutes  Persons in attendance:Patient      Diagnoses:   1. ADHD (attention deficit hyperactivity disorder), combined type  Lisdexamfetamine Dimesylate (VYVANSE) 60 MG Cap      2. Generalized anxiety disorder        3. PTSD (post-traumatic stress disorder)                    Symptoms currently being addressed in therapy: anxiety and interpersonal relationship skills    Therapeutic Intervention(s): Conflict resolution skills and Distress tolerance skills    Medications Reviewed: YES,   continuing 1mg quanfacine qhs for sleep and irritability which she would like to stay on if possible as it has been highly effective in anxiety, impulsivity, and aggressiveness. She will discuss options besides nurtec with neurologist for abortive treatment of migraines.     Discontinue Concerta due to residual executive dysfunctioning.  Started vyvanse 9/6, titrated to 60mg daily 9/13.  Refilled 11/7/23, no side effects noted and she feels it has been better for attention and concentration than concerta.     Treatment Goal(s)/Objective(s) addressed: interpersonal conflict skills, anxiety     Progress toward Treatment Goals: Moderate improvement in depression symptoms and moderate improvement in anxiety    Plan:      - Continue weekly therapy.    Discussed with supervising attending, Dr. Dario MD.    Cherelle Reyes M.D.
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
## 2023-11-13 ENCOUNTER — HOSPITAL ENCOUNTER (OUTPATIENT)
Dept: LAB | Facility: MEDICAL CENTER | Age: 38
End: 2023-11-13
Attending: STUDENT IN AN ORGANIZED HEALTH CARE EDUCATION/TRAINING PROGRAM
Payer: COMMERCIAL

## 2023-11-13 DIAGNOSIS — F43.10 PTSD (POST-TRAUMATIC STRESS DISORDER): ICD-10-CM

## 2023-11-13 DIAGNOSIS — F41.1 GENERALIZED ANXIETY DISORDER: ICD-10-CM

## 2023-11-13 DIAGNOSIS — F90.2 ADHD (ATTENTION DEFICIT HYPERACTIVITY DISORDER), COMBINED TYPE: ICD-10-CM

## 2023-11-13 LAB
25(OH)D3 SERPL-MCNC: 30 NG/ML (ref 30–100)
ALBUMIN SERPL BCP-MCNC: 4.7 G/DL (ref 3.2–4.9)
ALBUMIN/GLOB SERPL: 1.7 G/DL
ALP SERPL-CCNC: 51 U/L (ref 30–99)
ALT SERPL-CCNC: 10 U/L (ref 2–50)
ANION GAP SERPL CALC-SCNC: 11 MMOL/L (ref 7–16)
AST SERPL-CCNC: 17 U/L (ref 12–45)
BASOPHILS # BLD AUTO: 0.5 % (ref 0–1.8)
BASOPHILS # BLD: 0.04 K/UL (ref 0–0.12)
BILIRUB SERPL-MCNC: 0.3 MG/DL (ref 0.1–1.5)
BUN SERPL-MCNC: 7 MG/DL (ref 8–22)
CALCIUM ALBUM COR SERPL-MCNC: 8.9 MG/DL (ref 8.5–10.5)
CALCIUM SERPL-MCNC: 9.5 MG/DL (ref 8.5–10.5)
CHLORIDE SERPL-SCNC: 102 MMOL/L (ref 96–112)
CHOLEST SERPL-MCNC: 200 MG/DL (ref 100–199)
CO2 SERPL-SCNC: 24 MMOL/L (ref 20–33)
CREAT SERPL-MCNC: 0.67 MG/DL (ref 0.5–1.4)
EOSINOPHIL # BLD AUTO: 0.12 K/UL (ref 0–0.51)
EOSINOPHIL NFR BLD: 1.6 % (ref 0–6.9)
ERYTHROCYTE [DISTWIDTH] IN BLOOD BY AUTOMATED COUNT: 45.6 FL (ref 35.9–50)
FASTING STATUS PATIENT QL REPORTED: NORMAL
FERRITIN SERPL-MCNC: 126 NG/ML (ref 10–291)
FOLATE SERPL-MCNC: 9.1 NG/ML
GFR SERPLBLD CREATININE-BSD FMLA CKD-EPI: 116 ML/MIN/1.73 M 2
GLOBULIN SER CALC-MCNC: 2.8 G/DL (ref 1.9–3.5)
GLUCOSE SERPL-MCNC: 88 MG/DL (ref 65–99)
HCT VFR BLD AUTO: 45 % (ref 37–47)
HDLC SERPL-MCNC: 48 MG/DL
HGB BLD-MCNC: 14.4 G/DL (ref 12–16)
IMM GRANULOCYTES # BLD AUTO: 0.05 K/UL (ref 0–0.11)
IMM GRANULOCYTES NFR BLD AUTO: 0.7 % (ref 0–0.9)
LDLC SERPL CALC-MCNC: 133 MG/DL
LYMPHOCYTES # BLD AUTO: 2 K/UL (ref 1–4.8)
LYMPHOCYTES NFR BLD: 26.2 % (ref 22–41)
MAGNESIUM SERPL-MCNC: 2.3 MG/DL (ref 1.5–2.5)
MCH RBC QN AUTO: 30.3 PG (ref 27–33)
MCHC RBC AUTO-ENTMCNC: 32 G/DL (ref 32.2–35.5)
MCV RBC AUTO: 94.5 FL (ref 81.4–97.8)
MONOCYTES # BLD AUTO: 0.47 K/UL (ref 0–0.85)
MONOCYTES NFR BLD AUTO: 6.2 % (ref 0–13.4)
NEUTROPHILS # BLD AUTO: 4.94 K/UL (ref 1.82–7.42)
NEUTROPHILS NFR BLD: 64.8 % (ref 44–72)
NRBC # BLD AUTO: 0 K/UL
NRBC BLD-RTO: 0 /100 WBC (ref 0–0.2)
PLATELET # BLD AUTO: 282 K/UL (ref 164–446)
PMV BLD AUTO: 9.9 FL (ref 9–12.9)
POTASSIUM SERPL-SCNC: 4.1 MMOL/L (ref 3.6–5.5)
PROT SERPL-MCNC: 7.5 G/DL (ref 6–8.2)
RBC # BLD AUTO: 4.76 M/UL (ref 4.2–5.4)
SODIUM SERPL-SCNC: 137 MMOL/L (ref 135–145)
TRIGL SERPL-MCNC: 95 MG/DL (ref 0–149)
TSH SERPL DL<=0.005 MIU/L-ACNC: 1.14 UIU/ML (ref 0.38–5.33)
VIT B12 SERPL-MCNC: 1150 PG/ML (ref 211–911)
WBC # BLD AUTO: 7.6 K/UL (ref 4.8–10.8)

## 2023-11-13 PROCEDURE — 80061 LIPID PANEL: CPT

## 2023-11-13 PROCEDURE — 82746 ASSAY OF FOLIC ACID SERUM: CPT

## 2023-11-13 PROCEDURE — 82607 VITAMIN B-12: CPT

## 2023-11-13 PROCEDURE — 82728 ASSAY OF FERRITIN: CPT

## 2023-11-13 PROCEDURE — 80053 COMPREHEN METABOLIC PANEL: CPT

## 2023-11-13 PROCEDURE — 83735 ASSAY OF MAGNESIUM: CPT

## 2023-11-13 PROCEDURE — 82306 VITAMIN D 25 HYDROXY: CPT

## 2023-11-13 PROCEDURE — 85025 COMPLETE CBC W/AUTO DIFF WBC: CPT

## 2023-11-13 PROCEDURE — 36415 COLL VENOUS BLD VENIPUNCTURE: CPT

## 2023-11-13 PROCEDURE — 84443 ASSAY THYROID STIM HORMONE: CPT

## 2023-11-14 ENCOUNTER — OFFICE VISIT (OUTPATIENT)
Dept: BEHAVIORAL HEALTH | Facility: PSYCHIATRIC FACILITY | Age: 38
End: 2023-11-14
Payer: COMMERCIAL

## 2023-11-14 DIAGNOSIS — F41.1 GENERALIZED ANXIETY DISORDER: ICD-10-CM

## 2023-11-14 DIAGNOSIS — F90.2 ADHD (ATTENTION DEFICIT HYPERACTIVITY DISORDER), COMBINED TYPE: ICD-10-CM

## 2023-11-14 DIAGNOSIS — F43.10 PTSD (POST-TRAUMATIC STRESS DISORDER): ICD-10-CM

## 2023-11-14 PROCEDURE — 90837 PSYTX W PT 60 MINUTES: CPT | Performed by: STUDENT IN AN ORGANIZED HEALTH CARE EDUCATION/TRAINING PROGRAM

## 2023-11-14 NOTE — PROGRESS NOTES
[facility]  Psychotherapy Summary Note    Full therapy note has been documented and is under restricted viewing.  Please see below for summary of today's session.     Patient Name: Tesha Mason  Patient MRN: [id number]   Today's Date:     Resident/Fellow providing service: Cherelle Reyes M.D.    Type of session:Individual psychotherapy  Session start time: 2:35pm  Session stop time: 3:30pm  Length of time spent face to face with patient: 60 minutes  Persons in attendance:Patient      Diagnoses:   1. ADHD (attention deficit hyperactivity disorder), combined type        2. Generalized anxiety disorder        3. PTSD (post-traumatic stress disorder)                Symptoms currently being addressed in therapy: anxiety and interpersonal relationship skills    Therapeutic Intervention(s): Conflict resolution skills and Distress tolerance skills    Medications Reviewed: NO,   continuing 1mg quanfacine qhs for sleep and irritability which she would like to stay on if possible as it has been highly effective in anxiety, impulsivity, and aggressiveness. She will discuss options besides nurtec with neurologist for abortive treatment of migraines.     Discontinue Concerta due to residual executive dysfunctioning.  Started vyvanse 9/6, titrated to 60mg daily 9/13.  Refilled 11/7/23, no side effects noted and she feels it has been better for attention and concentration than concerta.     Treatment Goal(s)/Objective(s) addressed: interpersonal conflict skills, anxiety     Progress toward Treatment Goals: Moderate improvement in depression symptoms and moderate improvement in anxiety    Plan:      - Continue weekly therapy.    Discussed with supervising attending, Dr. Dario MD.    Cherelle Reyes M.D.

## 2023-11-21 ENCOUNTER — APPOINTMENT (OUTPATIENT)
Dept: BEHAVIORAL HEALTH | Facility: PSYCHIATRIC FACILITY | Age: 38
End: 2023-11-21
Payer: COMMERCIAL

## 2023-11-24 DIAGNOSIS — G43.719 INTRACTABLE CHRONIC MIGRAINE WITHOUT AURA AND WITHOUT STATUS MIGRAINOSUS: ICD-10-CM

## 2023-11-27 RX ORDER — ERENUMAB-AOOE 140 MG/ML
140 INJECTION, SOLUTION SUBCUTANEOUS
Qty: 1 ML | Refills: 5 | Status: SHIPPED | OUTPATIENT
Start: 2023-11-27 | End: 2024-01-08

## 2023-11-27 NOTE — TELEPHONE ENCOUNTER
Received request via: Pharmacy    Was the patient seen in the last year in this department? Yes   Date of last office visit 2/21/23     Per last Neurology Office Visit, when was the date of next follow up visit set for?                            Date of office visit follow up request 6/21/23     Does the patient have an upcoming appointment? No   If yes, when?     Does the patient have an active prescription (recently filled or refills available) for medication(s) requested? No    Does the patient have retirement Plus and need 100 day supply (blood pressure, diabetes and cholesterol meds only)? Medication is not for cholesterol, blood pressure or diabetes

## 2023-11-28 ENCOUNTER — OFFICE VISIT (OUTPATIENT)
Dept: BEHAVIORAL HEALTH | Facility: PSYCHIATRIC FACILITY | Age: 38
End: 2023-11-28
Payer: COMMERCIAL

## 2023-11-28 DIAGNOSIS — F41.1 GENERALIZED ANXIETY DISORDER: ICD-10-CM

## 2023-11-28 DIAGNOSIS — F90.2 ADHD (ATTENTION DEFICIT HYPERACTIVITY DISORDER), COMBINED TYPE: ICD-10-CM

## 2023-11-28 DIAGNOSIS — F43.10 PTSD (POST-TRAUMATIC STRESS DISORDER): ICD-10-CM

## 2023-11-28 PROCEDURE — 90837 PSYTX W PT 60 MINUTES: CPT | Performed by: STUDENT IN AN ORGANIZED HEALTH CARE EDUCATION/TRAINING PROGRAM

## 2023-11-29 NOTE — PROGRESS NOTES
[facility]  Psychotherapy Summary Note    Full therapy note has been documented and is under restricted viewing.  Please see below for summary of today's session.     Patient Name: Tesha Mason  Patient MRN: [id number]   Today's Date:     Resident/Fellow providing service: Cherelle Reyes M.D.    Type of session:Individual psychotherapy  Session start time: 2:35pm  Session stop time: 3:30pm  Length of time spent face to face with patient: 60 minutes  Persons in attendance:Patient      Diagnoses:   1. Generalized anxiety disorder        2. ADHD (attention deficit hyperactivity disorder), combined type        3. PTSD (post-traumatic stress disorder)                  Symptoms currently being addressed in therapy: anxiety and interpersonal relationship skills    Therapeutic Intervention(s): Conflict resolution skills and Distress tolerance skills    Medications Reviewed: NO,   continuing 1mg quanfacine qhs for sleep and irritability which she would like to stay on if possible as it has been highly effective in anxiety, impulsivity, and aggressiveness. She will discuss options besides nurtec with neurologist for abortive treatment of migraines.     Discontinued Concerta due to residual executive dysfunctioning.  Started vyvanse 9/6, titrated to 60mg daily 9/13.  Refilled 11/7/23, no side effects noted and she feels it has been better for attention and concentration than concerta.     Treatment Goal(s)/Objective(s) addressed: interpersonal conflict skills, anxiety     Progress toward Treatment Goals: Moderate improvement in depression symptoms and moderate improvement in anxiety    Plan:      - Continue weekly therapy.    Discussed with supervising attending, Dr. Jayshree MD.    Cherelle Reyes M.D.
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
## 2023-12-01 ENCOUNTER — TELEPHONE (OUTPATIENT)
Dept: PHARMACY | Facility: MEDICAL CENTER | Age: 38
End: 2023-12-01
Payer: COMMERCIAL

## 2023-12-01 PROCEDURE — RXMED WILLOW AMBULATORY MEDICATION CHARGE: Performed by: PSYCHIATRY & NEUROLOGY

## 2023-12-01 NOTE — TELEPHONE ENCOUNTER
Contact:  [phone]    Tesha Mason     Phone number: [phone]    Name of person spoken with and relationship to patient: Tesha, self   Patient’s Adherence:            How patient is doing on medication:  well    How many missed doses and reason: 0    Any new medications: no    Any new conditions: no    Any new allergies: no    Any new side effects: no     Any new diagnoses: no     How many doses remaining:  Aimovig (0), Nurtec (8)    Did patient want to speak with pharmacist: no  Delivery:            Delivery date and method: ship 12/5 overnight cold to arrive on 12/6     Needs by Date:  12/7    Signature required:  no    Any additional details for :  knock first. If no answer may leave in big container by door  Teach Appointment Date:  09/14/2022, 02/24/2023  Shipping Address: [facility] [address], [facility] [address]   Medication(name, strength and dose):  Aimovig 140 MG/ML Soaj SubQ every 30 days, Nurtec 75 MG Tbdp qd prn  Copay: $0  Payment Method: na  Supplies:  na  Additional info:  ETHEL Parkinson. She stated doing well on the medication. She mentioned that if she continues to do well with the medications, that Dr Love said she won't have to f/u with him and that her PCP can resume with these medications. She is searching for a new PCP with a [facility]. She wanted to know if it would effect her services thru us if she had new dr. Reached out to Liaison [place]. She said it should not effect anything as long as [facility] proviider, No further questions/concerns at this time

## 2023-12-05 ENCOUNTER — PHARMACY VISIT (OUTPATIENT)
Dept: PHARMACY | Facility: MEDICAL CENTER | Age: 38
End: 2023-12-05
Payer: MEDICARE

## 2023-12-05 ENCOUNTER — OFFICE VISIT (OUTPATIENT)
Dept: BEHAVIORAL HEALTH | Facility: PSYCHIATRIC FACILITY | Age: 38
End: 2023-12-05
Payer: COMMERCIAL

## 2023-12-05 DIAGNOSIS — F41.1 GENERALIZED ANXIETY DISORDER: ICD-10-CM

## 2023-12-05 DIAGNOSIS — F90.2 ADHD (ATTENTION DEFICIT HYPERACTIVITY DISORDER), COMBINED TYPE: ICD-10-CM

## 2023-12-05 PROCEDURE — 90837 PSYTX W PT 60 MINUTES: CPT | Performed by: STUDENT IN AN ORGANIZED HEALTH CARE EDUCATION/TRAINING PROGRAM

## 2023-12-05 RX ORDER — LISDEXAMFETAMINE DIMESYLATE CAPSULES 70 MG/1
70 CAPSULE ORAL EVERY MORNING
Qty: 30 CAPSULE | Refills: 0 | Status: SHIPPED | OUTPATIENT
Start: 2023-12-05 | End: 2024-01-02 | Stop reason: SDUPTHER

## 2023-12-06 NOTE — PROGRESS NOTES
[facility]  Psychotherapy Summary Note    Full therapy note has been documented and is under restricted viewing.  Please see below for summary of today's session.     Patient Name: Tesha Mason  Patient MRN: [id number]   Today's Date:     Resident/Fellow providing service: Cherelle Reyes M.D.    Type of session:Individual psychotherapy  Session start time: 3:00pm  Session stop time: 4:00pm  Length of time spent face to face with patient: 60 minutes  Persons in attendance:Patient      Diagnoses:   1. ADHD (attention deficit hyperactivity disorder), combined type  lisdexamfetamine (VYVANSE) 70 MG capsule                Symptoms currently being addressed in therapy: anxiety and interpersonal relationship skills    Therapeutic Intervention(s): Conflict resolution skills and Distress tolerance skills    Medications Reviewed: YES,   continuing 1mg quanfacine qhs for sleep and irritability which she would like to stay on if possible as it has been highly effective in anxiety, impulsivity, and aggressiveness.     Discontinued Concerta due to residual executive dysfunctioning.  Started vyvanse 9/6, titrated to 60mg daily 9/13.  Refilled 11/7/23, increased to 70mg 12/5/23. no side effects noted and she feels it has been better for attention and concentration than concerta.     Treatment Goal(s)/Objective(s) addressed: interpersonal conflict skills, anxiety     Progress toward Treatment Goals: Moderate improvement in depression symptoms and moderate improvement in anxiety    Plan:      - Continue weekly therapy.    Discussed with supervising attending, Dr. Jayshree MD.    Cherelle Reyes M.D.
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
## 2023-12-12 ENCOUNTER — APPOINTMENT (OUTPATIENT)
Dept: BEHAVIORAL HEALTH | Facility: PSYCHIATRIC FACILITY | Age: 38
End: 2023-12-12
Payer: COMMERCIAL

## 2023-12-19 ENCOUNTER — OFFICE VISIT (OUTPATIENT)
Dept: BEHAVIORAL HEALTH | Facility: PSYCHIATRIC FACILITY | Age: 38
End: 2023-12-19
Payer: COMMERCIAL

## 2023-12-19 DIAGNOSIS — F90.2 ADHD (ATTENTION DEFICIT HYPERACTIVITY DISORDER), COMBINED TYPE: ICD-10-CM

## 2023-12-19 DIAGNOSIS — F41.1 GENERALIZED ANXIETY DISORDER: ICD-10-CM

## 2023-12-19 DIAGNOSIS — F43.10 PTSD (POST-TRAUMATIC STRESS DISORDER): ICD-10-CM

## 2023-12-19 PROCEDURE — 90837 PSYTX W PT 60 MINUTES: CPT | Mod: GC | Performed by: STUDENT IN AN ORGANIZED HEALTH CARE EDUCATION/TRAINING PROGRAM
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
## 2023-12-20 NOTE — PROGRESS NOTES
[facility]  Psychotherapy Summary Note    Full therapy note has been documented and is under restricted viewing.  Please see below for summary of today's session.     Patient Name: Tesha Mason  Patient MRN: [id number]   Today's Date:     Resident/Fellow providing service: Cherelle Reyes M.D.    Type of session:Individual psychotherapy  Session start time: 3:00pm  Session stop time: 4:00pm  Length of time spent face to face with patient: 60 minutes  Persons in attendance:Patient      Diagnoses:   1. ADHD (attention deficit hyperactivity disorder), combined type        2. Generalized anxiety disorder        3. PTSD (post-traumatic stress disorder)                    Symptoms currently being addressed in therapy: anxiety and interpersonal relationship skills    Therapeutic Intervention(s): Conflict resolution skills and Distress tolerance skills    Medications Reviewed: NO,     Per records:   continuing 1mg quanfacine qhs for sleep and irritability which she would like to stay on if possible as it has been highly effective in anxiety, impulsivity, and aggressiveness.     Discontinued Concerta due to residual executive dysfunctioning.  Started vyvanse 9/6, titrated to 60mg daily 9/13.  Refilled 11/7/23, increased to 70mg 12/5/23. no side effects noted and she feels it has been better for attention and concentration than concerta.     Treatment Goal(s)/Objective(s) addressed: interpersonal conflict skills, anxiety     Progress toward Treatment Goals: Moderate improvement in depression symptoms and moderate improvement in anxiety    Plan:      - Continue weekly therapy.    Discussed with supervising attending, Dr. Dario MD.    Cherelle Reyes M.D.

## 2023-12-26 ENCOUNTER — APPOINTMENT (OUTPATIENT)
Dept: BEHAVIORAL HEALTH | Facility: PSYCHIATRIC FACILITY | Age: 38
End: 2023-12-26
Payer: COMMERCIAL

## 2024-01-02 ENCOUNTER — OFFICE VISIT (OUTPATIENT)
Dept: BEHAVIORAL HEALTH | Facility: PSYCHIATRIC FACILITY | Age: 39
End: 2024-01-02
Payer: COMMERCIAL

## 2024-01-02 DIAGNOSIS — F90.2 ADHD (ATTENTION DEFICIT HYPERACTIVITY DISORDER), COMBINED TYPE: ICD-10-CM

## 2024-01-02 DIAGNOSIS — F41.1 GENERALIZED ANXIETY DISORDER: ICD-10-CM

## 2024-01-02 PROCEDURE — 90837 PSYTX W PT 60 MINUTES: CPT | Performed by: STUDENT IN AN ORGANIZED HEALTH CARE EDUCATION/TRAINING PROGRAM

## 2024-01-02 RX ORDER — LISDEXAMFETAMINE DIMESYLATE CAPSULES 70 MG/1
70 CAPSULE ORAL EVERY MORNING
Qty: 30 CAPSULE | Refills: 0 | Status: SHIPPED | OUTPATIENT
Start: 2024-01-02 | End: 2024-02-01

## 2024-01-02 RX ORDER — SERTRALINE HYDROCHLORIDE 100 MG/1
200 TABLET, FILM COATED ORAL DAILY
Qty: 180 TABLET | Refills: 0 | Status: SHIPPED | OUTPATIENT
Start: 2024-01-02 | End: 2024-04-01

## 2024-01-02 RX ORDER — GUANFACINE 1 MG/1
1 TABLET ORAL
Qty: 90 TABLET | Refills: 1 | Status: SHIPPED | OUTPATIENT
Start: 2024-01-02 | End: 2024-03-26 | Stop reason: SDUPTHER
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
## 2024-01-03 NOTE — PROGRESS NOTES
[facility]  Psychotherapy Summary Note    Full therapy note has been documented and is under restricted viewing.  Please see below for summary of today's session.     Patient Name: Tesha Mason  Patient MRN: [id number]   Today's Date:     Resident/Fellow providing service: Cherelle Reyes M.D.    Type of session:Individual psychotherapy  Session start time: 3:00pm  Session stop time: 4:00pm  Length of time spent face to face with patient: 60 minutes  Persons in attendance:Patient      Diagnoses:   1. Generalized anxiety disorder  guanFACINE (TENEX) 1 MG Tab    sertraline (ZOLOFT) 100 MG Tab      2. ADHD (attention deficit hyperactivity disorder), combined type  lisdexamfetamine (VYVANSE) 70 MG capsule                  Symptoms currently being addressed in therapy: anxiety and interpersonal relationship skills    Therapeutic Intervention(s): Conflict resolution skills and Distress tolerance skills    Medications Reviewed:  yes, refilled.     continuing 1mg quanfacine qhs for sleep and irritability which she would like to stay on if possible as it has been highly effective in anxiety, impulsivity, and aggressiveness.     Discontinued Concerta due to residual executive dysfunctioning.  Started vyvanse 9/6, titrated to 60mg daily 9/13.  Refilled 11/7/23, increased to 70mg 12/5/23. no side effects noted and she feels it has been better for attention and concentration than concerta but no change in inattention while driving. Will consider increased dose or change if persistent.     Treatment Goal(s)/Objective(s) addressed: interpersonal conflict skills, anxiety     Progress toward Treatment Goals: Moderate improvement in depression symptoms and moderate improvement in anxiety    Plan:      - Continue weekly therapy.    Discussed with supervising attending, Dr. Jayshree MD.    Cherelle Reyes M.D.

## 2024-01-04 DIAGNOSIS — G43.719 INTRACTABLE CHRONIC MIGRAINE WITHOUT AURA AND WITHOUT STATUS MIGRAINOSUS: Primary | ICD-10-CM

## 2024-01-04 RX ORDER — ERENUMAB-AOOE 140 MG/ML
140 INJECTION, SOLUTION SUBCUTANEOUS
Qty: 1 ML | Refills: 5 | Status: CANCELLED | OUTPATIENT
Start: 2024-01-04

## 2024-01-08 RX ORDER — FREMANEZUMAB-VFRM 225 MG/1.5ML
225 INJECTION SUBCUTANEOUS
Qty: 1.5 ML | Refills: 11 | Status: SHIPPED | OUTPATIENT
Start: 2024-01-08 | End: 2025-01-02

## 2024-01-09 ENCOUNTER — OFFICE VISIT (OUTPATIENT)
Dept: BEHAVIORAL HEALTH | Facility: PSYCHIATRIC FACILITY | Age: 39
End: 2024-01-09
Payer: COMMERCIAL

## 2024-01-09 ENCOUNTER — TELEPHONE (OUTPATIENT)
Dept: NEUROLOGY | Facility: MEDICAL CENTER | Age: 39
End: 2024-01-09
Payer: COMMERCIAL

## 2024-01-09 DIAGNOSIS — F41.1 GENERALIZED ANXIETY DISORDER: ICD-10-CM

## 2024-01-09 DIAGNOSIS — F43.10 PTSD (POST-TRAUMATIC STRESS DISORDER): ICD-10-CM

## 2024-01-09 DIAGNOSIS — F90.2 ADHD (ATTENTION DEFICIT HYPERACTIVITY DISORDER), COMBINED TYPE: ICD-10-CM

## 2024-01-09 PROCEDURE — RXMED WILLOW AMBULATORY MEDICATION CHARGE: Performed by: PSYCHIATRY & NEUROLOGY

## 2024-01-09 PROCEDURE — 90837 PSYTX W PT 60 MINUTES: CPT | Performed by: STUDENT IN AN ORGANIZED HEALTH CARE EDUCATION/TRAINING PROGRAM

## 2024-01-09 NOTE — TELEPHONE ENCOUNTER
[employer] PA request via [employer]  for Ajovy 225 MG/1.5ML SOSY. (Quantity:4.5, Day Supply:90) - Copay $60.00 (No PA req'd??)     Insurance: [facility]  Member ID:  [id number]  BIN: [id number]  PCN: [id number]  Group: [id number]     Ran Test claim via [place] [facility] medication Pays for a $60.00 copay. Will outreach to patient to offer specialty pharmacy services and or release to preferred pharmacy

## 2024-01-10 ENCOUNTER — DOCUMENTATION (OUTPATIENT)
Dept: PHARMACY | Facility: MEDICAL CENTER | Age: 39
End: 2024-01-10
Payer: COMMERCIAL

## 2024-01-10 PROCEDURE — RXMED WILLOW AMBULATORY MEDICATION CHARGE: Performed by: PSYCHIATRY & NEUROLOGY

## 2024-01-10 NOTE — PROGRESS NOTES
[facility]  Psychotherapy Summary Note    Full therapy note has been documented and is under restricted viewing.  Please see below for summary of today's session.     Patient Name: Tesha Mason  Patient MRN: [id number]   Today's Date:     Resident/Fellow providing service: Cherelle Reyes M.D.    Type of session:Individual psychotherapy  Session start time: 3:00pm  Session stop time: 4:00pm  Length of time spent face to face with patient: 60 minutes  Persons in attendance:Patient      Diagnoses:   1. ADHD (attention deficit hyperactivity disorder), combined type        2. Generalized anxiety disorder        3. PTSD (post-traumatic stress disorder)              Symptoms currently being addressed in therapy: anxiety and interpersonal relationship skills    Therapeutic Intervention(s): Conflict resolution skills and Distress tolerance skills    Medications Reviewed:  NO     Per chart:  continuing 1mg quanfacine qhs for sleep and irritability which she would like to stay on if possible as it has been highly effective in anxiety, impulsivity, and aggressiveness.     Discontinued Concerta due to residual executive dysfunctioning.  Started vyvanse 9/6, titrated to 60mg daily 9/13.  Refilled 11/7/23, increased to 70mg 12/5/23. no side effects noted and she feels it has been better for attention and concentration than concerta but no change in inattention while driving. Will consider increased dose or change if persistent.     Treatment Goal(s)/Objective(s) addressed: interpersonal conflict skills, anxiety     Progress toward Treatment Goals: Moderate improvement in depression symptoms and moderate improvement in anxiety    Plan:      - Continue weekly therapy.    Discussed with supervising attending, Dr. Jayshree MD.    Cherelle Reyes M.D.
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
## 2024-01-10 NOTE — PROGRESS NOTES
PHARMACIST PRE SCREEN - New Onboarding  Diagnosis: Intractable chronic migraine without aura and without status migrainosus [G43.719]     Drug & Non-Drug Allergies:   EMR Reviewed. No concerning drug or non-drug allergies.     Drug Therapy (name/formulation/dose/route of admin/freq): Ajovy 225mg/1.5mL syringe, inject 1 syringe SQ every 30 days   EMR Reviewed         -Dose Appropriateness (Y/N): Y         -Renal or hepatic dose adjustments (Y/N): N         -Any comorbidities, PMH, precautions or contraindications exist that pose medication safety concerns (Y/N): N, MDD, ADHD, and anxiety noted          -Any relevant lab(s) needed that affects the initial start date (Y/N): N  List Past Treatments: Aimovig, Excedrin, venlafaxine, propranolol, sumatriptan, APAP, ibuprofen   EMR Med List reviewed. List DDI’s:   - Cat C: Vyvanse + Sertraline = increased risk for seratonin syndrome, monitor for symptoms      Patient’s ability to self-administer medication:   No issues identified per EMR     List Goals of Therapy:  - To reduce migraine frequency, duration, and severity  - To improve acute medication responsiveness and reduce the need for acute attacks  - To identify and modify migraine triggers           Initial Assessment   Dx:  Intractable chronic migraine without aura and without status migrainosus [G43.719]       Tx prescribed: Ajovy 225mg/1.5mL syringe, inject 1 syringe SQ every 30 days     - Administration: previously on Aimovig pens, familiar with injections     - SQ- 2 inches away from belly button, upper thigh, or back of arm     - Rotate sties     - Inject into clear area with no stretch marks, scars, or bruises     - Clean area with alcohol pad and let dry completely to avoid stinging      - Syringe:       - uncap syringe      - pinch skin at 45 Deg angle      -  inject ndl with quick dart like motion      - push plunger all the way slowly       - Pull straight out and discard in sharps container          -  Proper Handling/Disposal: sharps container    - Storage/Excursion: fridge,  take out of fridge about 30min before injecting to let it come to room temp, room temp for up to 7 days     - Duration of therapy: until ineffective or intolerable side effects        Adherence & Potential Barriers: encouarged phone alarm reminders- said this is what she has been doing, sets alarm for same day every month, Aimovig injection was due 1/10/24, plans to start Ajovy 1/11/24   Adherence Predictor Tool- low risk of non-adherence, understands importance, no concerns, no concerns with copay     - Missed dose mgmt: Inject asap then adjust monthly from date of last dose       List Common, Serious SE & Mitigation Reviewed:   - Injection site reactions: redness/irritation around injection site, usually goes away after a few hours, rotate sites   List Precautions Reviewed:    - Hypersensitivity reaction: rash that spreads, difficulty breathing, emergency, rare    - antibody development: can have antibodies develop where med may not be as effective, monitor efficacy    List Current SE Reviewed (if applicable): hasn't started    - Mitigation/mgmt: hasn't started        S/Sx: pain, pressure, nausea    How many migraine days in the past month? Around 4    Pain severity:  usually 4-4.5/10 but 2 this last month were 9-10/10   Avg duration of migraine in past month:  usually ones are a few hours, takes Nurtec     Clinically Relevant, Abnormal Labs: 11/13/23   - CBC: WNL   - Chem7: BUN: low 7    - Cr: 0.67, GFR: 116   - LFTs/Tbili: WNL   - B12: high 1150   - 25-hydroxy vitamin D: 30   - BP: 122/76 (5/2/23)       Wellness/Lifestyle Counseling:    - Support/QOL: doing well, switching from Aimovig to Ajovy so used to monthly dosing, has 2 dogs and a cat that keep her company    - Diet: encouraged well-balanced, healthy diet     - Sleep: said it could be better, was trying to rest before work     - Hydration: trying to drink a lot of water,  encouraged at least 1 L of water a day     - Journal: keeps track of migraines, encouraged for med efficacy     - Triggers: stress and not drinking enough water    - Immunizations: updated per Tesha        Med Rec/Updated drug list:    EMR inaccurate, medication changes reviewed with patient. (+) ibuprofen as needed and cyclobenzaprine as needed    DI Check:     - Cat C: Vyvanse + Sertraline = increased risk for seratonin syndrome, established with no issues, advised to watch for symptoms if doses are increased     Common DI & Dietary Avoidance: can call if starting any new meds to confirm no DDIs      Goals of Therapy:    - To reduce migraine frequency, duration, and severity   - To improve acute medication responsiveness and reduce the need for acute attacks   - To identify and modify migraine triggers   Patient has agreed/understands to goals of therapy during education/counseling       Additional:    Had the pleasure of speaking with Tesha to review her new migriane therapy, Ajovy. Changing from Aimovig to Ajovy due to insurance so she's familiar with injections. Reviewed differences from injecting pen vs syringe and Ajovy review as detailed above. Reported around 4 migraines this past month with 2 pain 4-4.5/10 and 2 pain 9-10/10 lasting a few hours. Said the Nurtec works well for abortive. She was due for her Aimovig injection yesterday so she'll plan to inject Ajovy tomorrow when received. Encouraged her to call with any questions or concerns and we'll check in briefly next week to see how first injection went. She was appreciative of review and welcoming to future calls.

## 2024-01-11 ENCOUNTER — PHARMACY VISIT (OUTPATIENT)
Dept: PHARMACY | Facility: MEDICAL CENTER | Age: 39
End: 2024-01-11
Payer: MEDICARE

## 2024-01-16 ENCOUNTER — OFFICE VISIT (OUTPATIENT)
Dept: BEHAVIORAL HEALTH | Facility: PSYCHIATRIC FACILITY | Age: 39
End: 2024-01-16
Payer: COMMERCIAL

## 2024-01-16 DIAGNOSIS — F41.1 GENERALIZED ANXIETY DISORDER: ICD-10-CM

## 2024-01-16 DIAGNOSIS — F90.2 ADHD (ATTENTION DEFICIT HYPERACTIVITY DISORDER), COMBINED TYPE: ICD-10-CM

## 2024-01-16 DIAGNOSIS — F43.10 PTSD (POST-TRAUMATIC STRESS DISORDER): ICD-10-CM

## 2024-01-16 PROCEDURE — 90837 PSYTX W PT 60 MINUTES: CPT | Performed by: STUDENT IN AN ORGANIZED HEALTH CARE EDUCATION/TRAINING PROGRAM
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
## 2024-01-17 NOTE — PROGRESS NOTES
[facility]  Psychotherapy Summary Note    Full therapy note has been documented and is under restricted viewing.  Please see below for summary of today's session.     Patient Name: Tesha Mason  Patient MRN: [id number]   Today's Date:     Resident/Fellow providing service: Cherelle Reyes M.D.    Type of session:Individual psychotherapy  Session start time: 3:00pm  Session stop time: 4:00pm  Length of time spent face to face with patient: 60 minutes  Persons in attendance:Patient      Diagnoses:   1. ADHD (attention deficit hyperactivity disorder), combined type        2. Generalized anxiety disorder        3. PTSD (post-traumatic stress disorder)                Symptoms currently being addressed in therapy: anxiety and interpersonal relationship skills    Therapeutic Intervention(s): Conflict resolution skills and Distress tolerance skills    Medications Reviewed:  NO     Per chart:  continuing 1mg quanfacine qhs for sleep and irritability which she would like to stay on if possible as it has been highly effective in anxiety, impulsivity, and aggressiveness.     Discontinued Concerta due to residual executive dysfunctioning.  Started vyvanse 9/6, titrated to 60mg daily 9/13.  Refilled 11/7/23, increased to 70mg 12/5/23. no side effects noted and she feels it has been better for attention and concentration than concerta but no change in inattention while driving. Will consider increased dose or change if persistent.     Treatment Goal(s)/Objective(s) addressed: interpersonal conflict skills, anxiety     Progress toward Treatment Goals: Moderate improvement in depression symptoms and moderate improvement in anxiety    Plan:      - Continue weekly therapy.    Discussed with supervising attending, Dr. Jayshree MD.    Cherelle Reyes M.D.

## 2024-01-19 ENCOUNTER — DOCUMENTATION (OUTPATIENT)
Dept: PHARMACY | Facility: MEDICAL CENTER | Age: 39
End: 2024-01-19
Payer: COMMERCIAL

## 2024-01-19 NOTE — PROGRESS NOTES
First Cycle   Dx: Intractable chronic migraine without aura and without status migrainosus [G43.719]       Txt review: Ajovy 225mg/1.5mL syringe, inject 1 syringe SQ every 30 days     - Administration: injected into back of arm, went great,  helped       Adherence: first injection Friday 1/12/24    - Missed dose mgmt: none       Current SE:  none    - Mitigation/Mgmt: none       List Changes to Allergies, Diagnoses: none      How many migraine days in the past month? Captured at initial   Pain severity: Captured at initial   Avg duration of migraine in past month: Captured at initial       Clinically Relevant, Abnormal Labs:  none new to review       Med Rec/Updated drug list: No changes since last assessment, reviewed with patient.        Additional:    Had a brief check in with Tesha to see how first Ajovy injection went. Said it went well,  helped give it into the back of her arm, no issues, just thought he may need to push plunger in faster. Kumar continue with injections the 12th of every month. She was appreciative of check in. Will follow up in 4 months for efficacy.

## 2024-01-23 ENCOUNTER — OFFICE VISIT (OUTPATIENT)
Dept: BEHAVIORAL HEALTH | Facility: PSYCHIATRIC FACILITY | Age: 39
End: 2024-01-23
Payer: COMMERCIAL

## 2024-01-23 DIAGNOSIS — F43.10 PTSD (POST-TRAUMATIC STRESS DISORDER): ICD-10-CM

## 2024-01-23 DIAGNOSIS — F90.2 ADHD (ATTENTION DEFICIT HYPERACTIVITY DISORDER), COMBINED TYPE: ICD-10-CM

## 2024-01-23 DIAGNOSIS — F41.1 GENERALIZED ANXIETY DISORDER: ICD-10-CM

## 2024-01-23 PROCEDURE — 90837 PSYTX W PT 60 MINUTES: CPT | Performed by: STUDENT IN AN ORGANIZED HEALTH CARE EDUCATION/TRAINING PROGRAM

## 2024-01-24 NOTE — PROGRESS NOTES
[facility]  Psychotherapy Summary Note    Full therapy note has been documented and is under restricted viewing.  Please see below for summary of today's session.     Patient Name: Tesha Mason  Patient MRN: [id number]   Today's Date:     Resident/Fellow providing service: Cherelle Reyes M.D.    Type of session:Individual psychotherapy  Session start time: 3:00pm  Session stop time: 4:00pm  Length of time spent face to face with patient: 60 minutes  Persons in attendance:Patient      Diagnoses:   1. ADHD (attention deficit hyperactivity disorder), combined type        2. Generalized anxiety disorder        3. PTSD (post-traumatic stress disorder)                  Symptoms currently being addressed in therapy: anxiety and interpersonal relationship skills    Therapeutic Intervention(s): Conflict resolution skills and Distress tolerance skills    Medications Reviewed:  NO     Per chart:  continuing 1mg quanfacine qhs for sleep and irritability which she would like to stay on if possible as it has been highly effective in anxiety, impulsivity, and aggressiveness.     Discontinued Concerta due to residual executive dysfunctioning.  Started vyvanse 9/6, titrated to 60mg daily 9/13.  Refilled 11/7/23, increased to 70mg 12/5/23. no side effects noted and she feels it has been better for attention and concentration than concerta but no change in inattention while driving. Will consider increased dose or change if persistent.     Treatment Goal(s)/Objective(s) addressed: interpersonal conflict skills, anxiety     Progress toward Treatment Goals: Moderate improvement in depression symptoms and moderate improvement in anxiety    Plan:      - Continue weekly therapy.    Discussed with supervising attending, Dr. Jayshree MD.    Cherelle Reyes M.D.
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
## 2024-01-30 ENCOUNTER — OFFICE VISIT (OUTPATIENT)
Dept: BEHAVIORAL HEALTH | Facility: PSYCHIATRIC FACILITY | Age: 39
End: 2024-01-30
Payer: COMMERCIAL

## 2024-01-30 DIAGNOSIS — F43.10 PTSD (POST-TRAUMATIC STRESS DISORDER): ICD-10-CM

## 2024-01-30 DIAGNOSIS — F41.1 GENERALIZED ANXIETY DISORDER: ICD-10-CM

## 2024-01-30 DIAGNOSIS — F90.2 ADHD (ATTENTION DEFICIT HYPERACTIVITY DISORDER), COMBINED TYPE: ICD-10-CM

## 2024-01-30 PROCEDURE — 90837 PSYTX W PT 60 MINUTES: CPT | Performed by: STUDENT IN AN ORGANIZED HEALTH CARE EDUCATION/TRAINING PROGRAM
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
## 2024-02-01 ENCOUNTER — TELEPHONE (OUTPATIENT)
Dept: PHARMACY | Facility: MEDICAL CENTER | Age: 39
End: 2024-02-01
Payer: COMMERCIAL

## 2024-02-01 NOTE — TELEPHONE ENCOUNTER
Nurtec expires 2/21/24 Renewal will need updated progress notes for renewal to be considered. Will the patient be continuing this therapy?

## 2024-02-05 ENCOUNTER — TELEPHONE (OUTPATIENT)
Dept: PHARMACY | Facility: MEDICAL CENTER | Age: 39
End: 2024-02-05
Payer: COMMERCIAL

## 2024-02-05 DIAGNOSIS — G43.719 INTRACTABLE CHRONIC MIGRAINE WITHOUT AURA AND WITHOUT STATUS MIGRAINOSUS: ICD-10-CM

## 2024-02-05 RX ORDER — RIMEGEPANT SULFATE 75 MG/75MG
75 TABLET, ORALLY DISINTEGRATING ORAL
Qty: 8 TABLET | Refills: 3 | Status: SHIPPED | OUTPATIENT
Start: 2024-02-05 | End: 2024-03-28

## 2024-02-05 NOTE — TELEPHONE ENCOUNTER
Received request via: Pharmacy    Medication Name/Dosage Rimegepant Sulfate (NURTEC) 75 MG TABLET DISPERSIBLE     When was medication last prescribed 05/15/24    How many refills were previously provided 5    How many Refills does he patient have left from last prescription 0    Was the patient seen in the last year in this department? Yes   Date of last office visit 02/21/23     Per last Neurology Office Visit, when was the date of next follow up visit set for?                            Date of office visit follow up request 4 months      Does the patient have an upcoming appointment? No   If yes, when will call to schedule              If no, schedule appointment will call to schedule     Does the patient have retirement Plus and need 100 day supply (blood pressure, diabetes and cholesterol meds only)? Patient does not have SCP

## 2024-02-05 NOTE — TELEPHONE ENCOUNTER
Received Refill PA request via [facility]  for NURTEC 75 MG TABLET DISPERSIBLE. (Quantity:8, Day Supply:30)     Insurance: [email or web address]  Member ID:  [id number]  BIN: [id number]  PCN: [id number]  Group: [id number]     Ran Test claim via [employer] [facility] medication Pays for a $0.00 copay. Will outreach to patient to offer specialty pharmacy services and or release to preferred pharmacy. [employer] has applied paid $60.00 toward your copay voucher to reduce the patients copay. Mention sponsorship to patient  PA ON FILE

## 2024-02-06 ENCOUNTER — APPOINTMENT (OUTPATIENT)
Dept: BEHAVIORAL HEALTH | Facility: PSYCHIATRIC FACILITY | Age: 39
End: 2024-02-06
Payer: COMMERCIAL

## 2024-02-06 DIAGNOSIS — F90.2 ADHD (ATTENTION DEFICIT HYPERACTIVITY DISORDER), COMBINED TYPE: Primary | ICD-10-CM

## 2024-02-06 RX ORDER — LISDEXAMFETAMINE DIMESYLATE CAPSULES 70 MG/1
70 CAPSULE ORAL EVERY MORNING
Qty: 30 CAPSULE | Refills: 0 | Status: SHIPPED | OUTPATIENT
Start: 2024-02-06 | End: 2024-02-27 | Stop reason: SDUPTHER

## 2024-02-09 ENCOUNTER — TELEPHONE (OUTPATIENT)
Dept: PHARMACY | Facility: MEDICAL CENTER | Age: 39
End: 2024-02-09
Payer: COMMERCIAL

## 2024-02-09 PROCEDURE — RXMED WILLOW AMBULATORY MEDICATION CHARGE: Performed by: PSYCHIATRY & NEUROLOGY

## 2024-02-10 NOTE — TELEPHONE ENCOUNTER
Contact:      Phone number: [phone], Mobile    Name of person spoken with and relationship to patient: Tesha Mason, Self  Patient’s Adherence:      How patient is doing on medication: Good    How many missed doses and reason: 0    Any new medications: No    Any new conditions: No    Any new allergies: No    Any new side effects: No    Any new diagnoses: No    How many doses remainin-Ajovy, 6 tabs-Nurtec    Did patient want to speak with pharmacist: No  Delivery:      Delivery date and method:  via UPS, overnight    Needs by Date:     Signature required: No    Any additional details for : N/A  Teach Appointment Date: 23, 01/10/24  Shipping Address: [address] [place] [address]  Medication (name, strength and dose): Ajovy 225mg/1.5mL, Nurtec 75mg tabs  Copay: $15  Payment Method: [employer]  Supplies: None  Additional Information: Next call date: .

## 2024-02-13 ENCOUNTER — OFFICE VISIT (OUTPATIENT)
Dept: BEHAVIORAL HEALTH | Facility: PSYCHIATRIC FACILITY | Age: 39
End: 2024-02-13
Payer: COMMERCIAL

## 2024-02-13 DIAGNOSIS — F43.10 PTSD (POST-TRAUMATIC STRESS DISORDER): ICD-10-CM

## 2024-02-13 DIAGNOSIS — F41.1 GENERALIZED ANXIETY DISORDER: ICD-10-CM

## 2024-02-13 DIAGNOSIS — F90.2 ADHD (ATTENTION DEFICIT HYPERACTIVITY DISORDER), COMBINED TYPE: ICD-10-CM

## 2024-02-13 PROCEDURE — 90837 PSYTX W PT 60 MINUTES: CPT | Performed by: STUDENT IN AN ORGANIZED HEALTH CARE EDUCATION/TRAINING PROGRAM

## 2024-02-13 NOTE — PROGRESS NOTES
[facility]  Psychotherapy Summary Note    Full therapy note has been documented and is under restricted viewing.  Please see below for summary of today's session.     Patient Name: Tesha Mason  Patient MRN: [id number]   Today's Date:     Resident/Fellow providing service: Cherelle Reyes M.D.    Type of session:Individual psychotherapy  Session start time: 2:00pm  Session stop time: 3:00pm  Length of time spent face to face with patient: 60 minutes  Persons in attendance:Patient      Diagnoses:   1. ADHD (attention deficit hyperactivity disorder), combined type        2. Generalized anxiety disorder        3. PTSD (post-traumatic stress disorder)                    Symptoms currently being addressed in therapy: anxiety and interpersonal relationship skills    Therapeutic Intervention(s): Conflict resolution skills and Distress tolerance skills    Medications Reviewed:  NO     Per chart:  continuing 1mg quanfacine qhs for sleep and irritability which she would like to stay on if possible as it has been highly effective in anxiety, impulsivity, and aggressiveness.     Discontinued Concerta due to residual executive dysfunctioning.  Started vyvanse 9/6, titrated to 60mg daily 9/13.  Refilled 11/7/23, increased to 70mg 12/5/23. no side effects noted and she feels it has been better for attention and concentration than concerta but no change in inattention while driving. Will consider increased dose or change if persistent.     Treatment Goal(s)/Objective(s) addressed: interpersonal conflict skills, anxiety     Progress toward Treatment Goals: Moderate improvement in depression symptoms and moderate improvement in anxiety    Plan:      - Continue weekly therapy.    Discussed with supervising attending, Dr. Jayshree MD.    Cherelle Reyes M.D.

## 2024-02-20 ENCOUNTER — OFFICE VISIT (OUTPATIENT)
Dept: BEHAVIORAL HEALTH | Facility: PSYCHIATRIC FACILITY | Age: 39
End: 2024-02-20
Payer: COMMERCIAL

## 2024-02-20 DIAGNOSIS — F41.1 GENERALIZED ANXIETY DISORDER: ICD-10-CM

## 2024-02-20 DIAGNOSIS — F90.2 ADHD (ATTENTION DEFICIT HYPERACTIVITY DISORDER), COMBINED TYPE: ICD-10-CM

## 2024-02-20 PROCEDURE — 90837 PSYTX W PT 60 MINUTES: CPT | Performed by: STUDENT IN AN ORGANIZED HEALTH CARE EDUCATION/TRAINING PROGRAM

## 2024-02-21 NOTE — PROGRESS NOTES
[facility]  Psychotherapy Summary Note    Full therapy note has been documented and is under restricted viewing.  Please see below for summary of today's session.     Patient Name: Tesha Mason  Patient MRN: [id number]   Today's Date:     Resident/Fellow providing service: Cherelle Reyes M.D.    Type of session:Individual psychotherapy  Session start time: 3:00pm  Session stop time: 4:00pm  Length of time spent face to face with patient: 60 minutes  Persons in attendance:Patient      Diagnoses:   1. ADHD (attention deficit hyperactivity disorder), combined type        2. Generalized anxiety disorder                      Symptoms currently being addressed in therapy: anxiety and interpersonal relationship skills    Therapeutic Intervention(s): Conflict resolution skills and Distress tolerance skills    Medications Reviewed:  NO     Per chart:  continuing 1mg quanfacine qhs for sleep and irritability which she would like to stay on if possible as it has been highly effective in anxiety, impulsivity, and aggressiveness.     Discontinued Concerta due to residual executive dysfunctioning.  Started vyvanse 9/6, titrated to 60mg daily 9/13.  Refilled 11/7/23, increased to 70mg 12/5/23. no side effects noted and she feels it has been better for attention and concentration than concerta but no change in inattention while driving. Will consider increased dose or change if persistent.     Treatment Goal(s)/Objective(s) addressed: interpersonal conflict skills, anxiety     Progress toward Treatment Goals: Moderate improvement in depression symptoms and moderate improvement in anxiety    Plan:      - Continue weekly therapy.    Discussed with supervising attending, Dr. Jayshree MD.    Cherelle Reyes M.D.
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
## 2024-02-27 ENCOUNTER — OFFICE VISIT (OUTPATIENT)
Dept: BEHAVIORAL HEALTH | Facility: PSYCHIATRIC FACILITY | Age: 39
End: 2024-02-27
Payer: COMMERCIAL

## 2024-02-27 DIAGNOSIS — F43.10 PTSD (POST-TRAUMATIC STRESS DISORDER): ICD-10-CM

## 2024-02-27 DIAGNOSIS — F90.2 ADHD (ATTENTION DEFICIT HYPERACTIVITY DISORDER), COMBINED TYPE: ICD-10-CM

## 2024-02-27 DIAGNOSIS — F41.1 GENERALIZED ANXIETY DISORDER: ICD-10-CM

## 2024-02-27 PROCEDURE — 90837 PSYTX W PT 60 MINUTES: CPT | Performed by: STUDENT IN AN ORGANIZED HEALTH CARE EDUCATION/TRAINING PROGRAM

## 2024-02-27 RX ORDER — LISDEXAMFETAMINE DIMESYLATE CAPSULES 70 MG/1
70 CAPSULE ORAL EVERY MORNING
Qty: 30 CAPSULE | Refills: 0 | Status: SHIPPED | OUTPATIENT
Start: 2024-02-27 | End: 2024-03-12 | Stop reason: SDUPTHER

## 2024-02-27 ASSESSMENT — PATIENT HEALTH QUESTIONNAIRE - PHQ9
6. FEELING BAD ABOUT YOURSELF - OR THAT YOU ARE A FAILURE OR HAVE LET YOURSELF OR YOUR FAMILY DOWN: SEVERAL DAYS
3. TROUBLE FALLING OR STAYING ASLEEP OR SLEEPING TOO MUCH: NEARLY EVERY DAY
SUM OF ALL RESPONSES TO PHQ9 QUESTIONS 1 AND 2: 3
1. LITTLE INTEREST OR PLEASURE IN DOING THINGS: MORE THAN HALF THE DAYS
9. THOUGHTS THAT YOU WOULD BE BETTER OFF DEAD, OR OF HURTING YOURSELF: SEVERAL DAYS
7. TROUBLE CONCENTRATING ON THINGS, SUCH AS READING THE NEWSPAPER OR WATCHING TELEVISION: NEARLY EVERY DAY
2. FEELING DOWN, DEPRESSED, IRRITABLE, OR HOPELESS: SEVERAL DAYS
5. POOR APPETITE OR OVEREATING: SEVERAL DAYS
8. MOVING OR SPEAKING SO SLOWLY THAT OTHER PEOPLE COULD HAVE NOTICED. OR THE OPPOSITE, BEING SO FIGETY OR RESTLESS THAT YOU HAVE BEEN MOVING AROUND A LOT MORE THAN USUAL: SEVERAL DAYS
SUM OF ALL RESPONSES TO PHQ QUESTIONS 1-9: 16
4. FEELING TIRED OR HAVING LITTLE ENERGY: NEARLY EVERY DAY

## 2024-02-28 NOTE — PROGRESS NOTES
[facility]  Psychotherapy Summary Note    Full therapy note has been documented and is under restricted viewing.  Please see below for summary of today's session.     Patient Name: Tesha Mason  Patient MRN: [id number]   Today's Date:     Resident/Fellow providing service: Cherelle Reyes M.D.    Type of session:Individual psychotherapy  Session start time: 3:00pm  Session stop time: 4:00pm  Length of time spent face to face with patient: 60 minutes  Persons in attendance:Patient      Diagnoses:   1. ADHD (attention deficit hyperactivity disorder), combined type  lisdexamfetamine (VYVANSE) 70 MG capsule                Symptoms currently being addressed in therapy: anxiety and interpersonal relationship skills    Therapeutic Intervention(s): Conflict resolution skills and Distress tolerance skills    Medications Reviewed: yes    continuing 1mg quanfacine qhs for sleep and irritability which she would like to stay on if possible as it has been highly effective in anxiety, impulsivity, and aggressiveness.     Discontinued Concerta due to residual executive dysfunctioning.  Started vyvanse 9/6, titrated to 60mg daily 9/13.  Refilled 11/7/23, increased to 70mg 12/5/23. no side effects noted and she feels it has been better for attention and concentration than concerta but no change in inattention while driving. Will consider adding Adderall IR in the PM for breakthrough symptoms     Treatment Goal(s)/Objective(s) addressed: interpersonal conflict skills, anxiety     Progress toward Treatment Goals: Moderate improvement in depression symptoms and moderate improvement in anxiety    Plan:      - Continue weekly therapy.    Discussed with supervising attending, Dr. Jayshree MD.    Cherelle Reyes M.D.
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
## 2024-03-05 ENCOUNTER — APPOINTMENT (OUTPATIENT)
Dept: BEHAVIORAL HEALTH | Facility: PSYCHIATRIC FACILITY | Age: 39
End: 2024-03-05
Payer: COMMERCIAL

## 2024-03-12 ENCOUNTER — OFFICE VISIT (OUTPATIENT)
Dept: BEHAVIORAL HEALTH | Facility: PSYCHIATRIC FACILITY | Age: 39
End: 2024-03-12
Payer: COMMERCIAL

## 2024-03-12 DIAGNOSIS — F90.2 ADHD (ATTENTION DEFICIT HYPERACTIVITY DISORDER), COMBINED TYPE: ICD-10-CM

## 2024-03-12 DIAGNOSIS — F41.1 GENERALIZED ANXIETY DISORDER: ICD-10-CM

## 2024-03-12 PROCEDURE — RXMED WILLOW AMBULATORY MEDICATION CHARGE: Performed by: STUDENT IN AN ORGANIZED HEALTH CARE EDUCATION/TRAINING PROGRAM

## 2024-03-12 PROCEDURE — 90837 PSYTX W PT 60 MINUTES: CPT | Performed by: STUDENT IN AN ORGANIZED HEALTH CARE EDUCATION/TRAINING PROGRAM

## 2024-03-12 RX ORDER — DEXTROAMPHETAMINE SACCHARATE, AMPHETAMINE ASPARTATE, DEXTROAMPHETAMINE SULFATE AND AMPHETAMINE SULFATE 5; 5; 5; 5 MG/1; MG/1; MG/1; MG/1
20 TABLET ORAL
Qty: 30 TABLET | Refills: 0 | Status: SHIPPED | OUTPATIENT
Start: 2024-03-12 | End: 2024-04-12

## 2024-03-12 RX ORDER — LISDEXAMFETAMINE DIMESYLATE CAPSULES 70 MG/1
70 CAPSULE ORAL EVERY MORNING
Qty: 30 CAPSULE | Refills: 0 | Status: SHIPPED | OUTPATIENT
Start: 2024-03-12 | End: 2024-04-12

## 2024-03-13 ENCOUNTER — PHARMACY VISIT (OUTPATIENT)
Dept: PHARMACY | Facility: MEDICAL CENTER | Age: 39
End: 2024-03-13
Payer: MEDICARE

## 2024-03-13 NOTE — PROGRESS NOTES
[facility]  Psychotherapy Summary Note    Full therapy note has been documented and is under restricted viewing.  Please see below for summary of today's session.     Patient Name: Tesha Mason  Patient MRN: [id number]   Today's Date:     Resident/Fellow providing service: Cherelle Reyes M.D.    Type of session:Individual psychotherapy  Session start time: 3:00pm  Session stop time: 4:00pm  Length of time spent face to face with patient: 60 minutes  Persons in attendance:Patient      Diagnoses:   1. Generalized anxiety disorder        2. ADHD (attention deficit hyperactivity disorder), combined type  amphetamine-dextroamphetamine (ADDERALL) 20 MG Tab                Symptoms currently being addressed in therapy: anxiety and interpersonal relationship skills    Therapeutic Intervention(s): Conflict resolution skills and Distress tolerance skills    Medications Reviewed: yes    continuing 1mg quanfacine qhs for sleep and irritability which she would like to stay on if possible as it has been highly effective in anxiety, impulsivity, and aggressiveness.     Discontinued Concerta due to residual executive dysfunctioning.  Started vyvanse 9/6, titrated to 60mg daily 9/13.  Refilled 11/7/23, increased to 70mg 12/5/23. no side effects noted and she feels it has been better for attention and concentration than concerta but no change in inattention while driving and significant decrease in symptom control in the afternoon, prescribed adderall IR 20mg qnoon, to be cut into 5mg for first week. Will titrate as tolerated.    Treatment Goal(s)/Objective(s) addressed: interpersonal conflict skills, anxiety     Progress toward Treatment Goals: Moderate improvement in depression symptoms and moderate improvement in anxiety    Plan:      - Continue weekly therapy.    Discussed with supervising attending, Dr. Jayshree MD.    Cherelle Reyes M.D.

## 2024-03-19 ENCOUNTER — OFFICE VISIT (OUTPATIENT)
Dept: BEHAVIORAL HEALTH | Facility: PSYCHIATRIC FACILITY | Age: 39
End: 2024-03-19
Payer: COMMERCIAL

## 2024-03-19 DIAGNOSIS — F90.2 ADHD (ATTENTION DEFICIT HYPERACTIVITY DISORDER), COMBINED TYPE: ICD-10-CM

## 2024-03-19 DIAGNOSIS — F41.1 GENERALIZED ANXIETY DISORDER: ICD-10-CM

## 2024-03-19 DIAGNOSIS — F43.10 PTSD (POST-TRAUMATIC STRESS DISORDER): ICD-10-CM

## 2024-03-19 PROCEDURE — 90837 PSYTX W PT 60 MINUTES: CPT | Performed by: STUDENT IN AN ORGANIZED HEALTH CARE EDUCATION/TRAINING PROGRAM

## 2024-03-20 NOTE — PROGRESS NOTES
[facility]  Psychotherapy Summary Note    Full therapy note has been documented and is under restricted viewing.  Please see below for summary of today's session.     Patient Name: Tesha Mason  Patient MRN: [id number]   Today's Date:     Resident/Fellow providing service: Cherelle Reyes M.D.    Type of session:Individual psychotherapy  Session start time: 3:00pm  Session stop time: 4:00pm  Length of time spent face to face with patient: 60 minutes  Persons in attendance:Patient      Diagnoses:   No diagnosis found.            Symptoms currently being addressed in therapy: anxiety and interpersonal relationship skills    Therapeutic Intervention(s): Conflict resolution skills and Distress tolerance skills    Medications Reviewed: yes    continuing 1mg quanfacine qhs for sleep and irritability which she would like to stay on if possible as it has been highly effective in anxiety, impulsivity, and aggressiveness.     Discontinued Concerta due to residual executive dysfunctioning.  Started vyvanse 9/6, titrated to 60mg daily 9/13.  Refilled 11/7/23, increased to 70mg 12/5/23. no side effects noted and she feels it has been better for attention and concentration than concerta but no change in inattention while driving and significant decrease in symptom control in the afternoon, prescribed adderall IR 20mg qnoon, to be cut into 5mg for first week. Will titrate as tolerated. Drastically improved focus with IR, will transition to adderall XR next month.     Treatment Goal(s)/Objective(s) addressed: interpersonal conflict skills, anxiety     Progress toward Treatment Goals: Moderate improvement in depression symptoms and moderate improvement in anxiety    Plan:      - Continue weekly therapy.    Discussed with supervising attending, Dr. Jayshree MD.    Cherelle Reyes M.D.

## 2024-03-26 ENCOUNTER — OFFICE VISIT (OUTPATIENT)
Dept: BEHAVIORAL HEALTH | Facility: PSYCHIATRIC FACILITY | Age: 39
End: 2024-03-26
Payer: COMMERCIAL

## 2024-03-26 DIAGNOSIS — F43.10 POSTTRAUMATIC STRESS DISORDER: ICD-10-CM

## 2024-03-26 DIAGNOSIS — F90.2 ADHD (ATTENTION DEFICIT HYPERACTIVITY DISORDER), COMBINED TYPE: ICD-10-CM

## 2024-03-26 DIAGNOSIS — F41.1 GENERALIZED ANXIETY DISORDER: ICD-10-CM

## 2024-03-26 RX ORDER — GUANFACINE 1 MG/1
1 TABLET ORAL
Qty: 90 TABLET | Refills: 1 | Status: SHIPPED | OUTPATIENT
Start: 2024-03-26

## 2024-03-26 NOTE — PROGRESS NOTES
[facility]  Psychotherapy Summary Note    Full therapy note has been documented and is under restricted viewing.  Please see below for summary of today's session.     Patient Name: Tesha Mason  Patient MRN: [id number]   Today's Date:     Resident/Fellow providing service: Cherelle Reyes M.D.    Type of session:Individual psychotherapy  Session start time: 3:00pm  Session stop time: 4:00pm  Length of time spent face to face with patient: 60 minutes  Persons in attendance:Patient      Diagnoses:   1. Generalized anxiety disorder  guanFACINE (TENEX) 1 MG Tab                  Symptoms currently being addressed in therapy: anxiety and interpersonal relationship skills    Therapeutic Intervention(s): Conflict resolution skills and Distress tolerance skills    Medications Reviewed: no    Per chart:    continuing 1mg quanfacine qhs for sleep and irritability which she would like to stay on if possible as it has been highly effective in anxiety, impulsivity, and aggressiveness.     Discontinued Concerta due to residual executive dysfunctioning.  Started vyvanse 9/6, titrated to 60mg daily 9/13.  Refilled 11/7/23, increased to 70mg 12/5/23. no side effects noted and she feels it has been better for attention and concentration than concerta but no change in inattention while driving and significant decrease in symptom control in the afternoon, prescribed adderall IR 20mg qnoon, to be cut into 5mg for first week. Will titrate as tolerated. Drastically improved focus with IR, will transition to adderall XR next month.     Treatment Goal(s)/Objective(s) addressed: interpersonal conflict skills, anxiety     Progress toward Treatment Goals: Moderate improvement in depression symptoms and moderate improvement in anxiety    Plan:      - Continue weekly therapy.    Discussed with supervising attending, Dr. Jayshree MD.    Cherelle Reyes M.D.

## 2024-04-02 ENCOUNTER — APPOINTMENT (OUTPATIENT)
Dept: BEHAVIORAL HEALTH | Facility: PSYCHIATRIC FACILITY | Age: 39
End: 2024-04-02
Payer: COMMERCIAL

## 2024-04-02 DIAGNOSIS — F90.2 ADHD (ATTENTION DEFICIT HYPERACTIVITY DISORDER), COMBINED TYPE: ICD-10-CM

## 2024-04-02 DIAGNOSIS — F41.1 GENERALIZED ANXIETY DISORDER: ICD-10-CM

## 2024-04-02 DIAGNOSIS — F43.10 POSTTRAUMATIC STRESS DISORDER: ICD-10-CM

## 2024-04-02 PROCEDURE — 90837 PSYTX W PT 60 MINUTES: CPT | Performed by: STUDENT IN AN ORGANIZED HEALTH CARE EDUCATION/TRAINING PROGRAM

## 2024-04-02 RX ORDER — DEXTROAMPHETAMINE SACCHARATE, AMPHETAMINE ASPARTATE MONOHYDRATE, DEXTROAMPHETAMINE SULFATE AND AMPHETAMINE SULFATE 5; 5; 5; 5 MG/1; MG/1; MG/1; MG/1
20 CAPSULE, EXTENDED RELEASE ORAL EVERY MORNING
Qty: 30 CAPSULE | Refills: 0 | Status: SHIPPED | OUTPATIENT
Start: 2024-04-10 | End: 2024-05-10
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
## 2024-04-09 ENCOUNTER — APPOINTMENT (OUTPATIENT)
Dept: BEHAVIORAL HEALTH | Facility: PSYCHIATRIC FACILITY | Age: 39
End: 2024-04-09
Payer: COMMERCIAL

## 2024-04-10 PROCEDURE — RXMED WILLOW AMBULATORY MEDICATION CHARGE: Performed by: PSYCHIATRY & NEUROLOGY

## 2024-04-11 ENCOUNTER — PHARMACY VISIT (OUTPATIENT)
Dept: PHARMACY | Facility: MEDICAL CENTER | Age: 39
End: 2024-04-11
Payer: MEDICARE

## 2024-04-11 ENCOUNTER — TELEPHONE (OUTPATIENT)
Dept: BEHAVIORAL HEALTH | Facility: PSYCHIATRIC FACILITY | Age: 39
End: 2024-04-11
Payer: COMMERCIAL

## 2024-04-16 ENCOUNTER — OFFICE VISIT (OUTPATIENT)
Dept: BEHAVIORAL HEALTH | Facility: PSYCHIATRIC FACILITY | Age: 39
End: 2024-04-16
Payer: COMMERCIAL

## 2024-04-16 DIAGNOSIS — F90.2 ADHD (ATTENTION DEFICIT HYPERACTIVITY DISORDER), COMBINED TYPE: ICD-10-CM

## 2024-04-16 DIAGNOSIS — F41.1 GENERALIZED ANXIETY DISORDER: ICD-10-CM

## 2024-04-16 DIAGNOSIS — F43.10 PTSD (POST-TRAUMATIC STRESS DISORDER): ICD-10-CM

## 2024-04-16 PROCEDURE — 90837 PSYTX W PT 60 MINUTES: CPT | Performed by: STUDENT IN AN ORGANIZED HEALTH CARE EDUCATION/TRAINING PROGRAM

## 2024-04-16 RX ORDER — SERTRALINE HYDROCHLORIDE 100 MG/1
200 TABLET, FILM COATED ORAL DAILY
Qty: 60 TABLET | Refills: 1 | Status: SHIPPED | OUTPATIENT
Start: 2024-04-16
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
## 2024-04-23 ENCOUNTER — OFFICE VISIT (OUTPATIENT)
Dept: BEHAVIORAL HEALTH | Facility: PSYCHIATRIC FACILITY | Age: 39
End: 2024-04-23
Payer: COMMERCIAL

## 2024-04-23 DIAGNOSIS — F41.1 GENERALIZED ANXIETY DISORDER: ICD-10-CM

## 2024-04-23 DIAGNOSIS — F90.2 ADHD (ATTENTION DEFICIT HYPERACTIVITY DISORDER), COMBINED TYPE: ICD-10-CM

## 2024-04-23 DIAGNOSIS — F43.10 POSTTRAUMATIC STRESS DISORDER: ICD-10-CM

## 2024-04-23 PROCEDURE — 90837 PSYTX W PT 60 MINUTES: CPT | Performed by: STUDENT IN AN ORGANIZED HEALTH CARE EDUCATION/TRAINING PROGRAM
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
## 2024-04-30 ENCOUNTER — OFFICE VISIT (OUTPATIENT)
Dept: BEHAVIORAL HEALTH | Facility: PSYCHIATRIC FACILITY | Age: 39
End: 2024-04-30
Payer: COMMERCIAL

## 2024-04-30 DIAGNOSIS — F43.10 PTSD (POST-TRAUMATIC STRESS DISORDER): ICD-10-CM

## 2024-04-30 DIAGNOSIS — F90.2 ADHD (ATTENTION DEFICIT HYPERACTIVITY DISORDER), COMBINED TYPE: ICD-10-CM
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
## 2024-05-07 ENCOUNTER — APPOINTMENT (OUTPATIENT)
Dept: BEHAVIORAL HEALTH | Facility: PSYCHIATRIC FACILITY | Age: 39
End: 2024-05-07
Payer: COMMERCIAL

## 2024-05-07 ENCOUNTER — TELEPHONE (OUTPATIENT)
Dept: PHARMACY | Facility: MEDICAL CENTER | Age: 39
End: 2024-05-07
Payer: COMMERCIAL

## 2024-05-07 PROCEDURE — RXMED WILLOW AMBULATORY MEDICATION CHARGE: Performed by: PSYCHIATRY & NEUROLOGY

## 2024-05-07 NOTE — TELEPHONE ENCOUNTER
Contact:            Phone number: [phone]    Name of person spoken with and relationship to patient: Tesha eckert  Patient’s Adherence:            How patient is doing on medication: doing well on both ajovy and nurtec    How many missed doses and reason: 0    Any new medications: No    Any new conditions: No    Any new allergies: No    Any new side effects: No    Any new diagnoses: No    How many doses remainin ajovy, 8 tabs nurtec    Did patient want to speak with pharmacist: No  Delivery:            Delivery date and method:  FEDEX    Needs by Date:     Signature required: NA    Any additional details for : NA  Next Injection Date (if applicable): injects on the   Shipping Address: [address] [place] [address]  [facility] [address]  Medication (name, strength and dose): AJOVY SOLUTION PREFILLED SYRINGE 225 MG/1.5ML, NURTEC TABLET DISINTEGRATING 75 MG  Copay: $15  Payment Method:  CC 5179  Supplies: None  Additional Information:            Next call date: 6/3

## 2024-05-14 ENCOUNTER — OFFICE VISIT (OUTPATIENT)
Dept: BEHAVIORAL HEALTH | Facility: PSYCHIATRIC FACILITY | Age: 39
End: 2024-05-14
Payer: COMMERCIAL

## 2024-05-14 DIAGNOSIS — F43.10 PTSD (POST-TRAUMATIC STRESS DISORDER): ICD-10-CM

## 2024-05-14 DIAGNOSIS — F90.2 ADHD (ATTENTION DEFICIT HYPERACTIVITY DISORDER), COMBINED TYPE: ICD-10-CM

## 2024-05-14 DIAGNOSIS — F41.1 GENERALIZED ANXIETY DISORDER: ICD-10-CM

## 2024-05-14 PROCEDURE — 90834 PSYTX W PT 45 MINUTES: CPT | Performed by: STUDENT IN AN ORGANIZED HEALTH CARE EDUCATION/TRAINING PROGRAM

## 2024-05-14 RX ORDER — SERTRALINE HYDROCHLORIDE 100 MG/1
200 TABLET, FILM COATED ORAL DAILY
Qty: 180 TABLET | Refills: 1 | Status: SHIPPED | OUTPATIENT
Start: 2024-05-14

## 2024-05-14 RX ORDER — DEXTROAMPHETAMINE SACCHARATE, AMPHETAMINE ASPARTATE MONOHYDRATE, DEXTROAMPHETAMINE SULFATE AND AMPHETAMINE SULFATE 5; 5; 5; 5 MG/1; MG/1; MG/1; MG/1
20 CAPSULE, EXTENDED RELEASE ORAL EVERY MORNING
Qty: 30 CAPSULE | Refills: 0 | Status: SHIPPED | OUTPATIENT
Start: 2024-05-14 | End: 2024-06-13

## 2024-05-14 RX ORDER — DEXTROAMPHETAMINE SACCHARATE, AMPHETAMINE ASPARTATE, DEXTROAMPHETAMINE SULFATE AND AMPHETAMINE SULFATE 5; 5; 5; 5 MG/1; MG/1; MG/1; MG/1
20 TABLET ORAL
Qty: 30 TABLET | Refills: 0 | Status: SHIPPED | OUTPATIENT
Start: 2024-05-14 | End: 2024-06-13
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
## 2024-05-21 ENCOUNTER — OFFICE VISIT (OUTPATIENT)
Dept: BEHAVIORAL HEALTH | Facility: PSYCHIATRIC FACILITY | Age: 39
End: 2024-05-21
Payer: COMMERCIAL

## 2024-05-21 DIAGNOSIS — F43.10 PTSD (POST-TRAUMATIC STRESS DISORDER): ICD-10-CM

## 2024-05-21 DIAGNOSIS — F90.2 ADHD (ATTENTION DEFICIT HYPERACTIVITY DISORDER), COMBINED TYPE: ICD-10-CM

## 2024-05-21 PROCEDURE — 90834 PSYTX W PT 45 MINUTES: CPT | Performed by: STUDENT IN AN ORGANIZED HEALTH CARE EDUCATION/TRAINING PROGRAM
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
## 2024-05-28 ENCOUNTER — APPOINTMENT (OUTPATIENT)
Dept: BEHAVIORAL HEALTH | Facility: PSYCHIATRIC FACILITY | Age: 39
End: 2024-05-28
Payer: COMMERCIAL

## 2024-05-28 DIAGNOSIS — F90.2 ADHD (ATTENTION DEFICIT HYPERACTIVITY DISORDER), COMBINED TYPE: ICD-10-CM

## 2024-05-28 DIAGNOSIS — F41.1 GENERALIZED ANXIETY DISORDER: ICD-10-CM

## 2024-05-28 DIAGNOSIS — F43.10 PTSD (POST-TRAUMATIC STRESS DISORDER): ICD-10-CM
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
## 2024-06-04 ENCOUNTER — PHARMACY VISIT (OUTPATIENT)
Dept: PHARMACY | Facility: MEDICAL CENTER | Age: 39
End: 2024-06-04
Payer: MEDICARE

## 2024-06-04 ENCOUNTER — OFFICE VISIT (OUTPATIENT)
Dept: BEHAVIORAL HEALTH | Facility: PSYCHIATRIC FACILITY | Age: 39
End: 2024-06-04
Payer: COMMERCIAL

## 2024-06-04 DIAGNOSIS — F41.1 GENERALIZED ANXIETY DISORDER: ICD-10-CM

## 2024-06-04 DIAGNOSIS — F90.2 ADHD (ATTENTION DEFICIT HYPERACTIVITY DISORDER), COMBINED TYPE: ICD-10-CM

## 2024-06-04 DIAGNOSIS — F43.10 PTSD (POST-TRAUMATIC STRESS DISORDER): ICD-10-CM

## 2024-06-04 PROCEDURE — 90834 PSYTX W PT 45 MINUTES: CPT | Performed by: STUDENT IN AN ORGANIZED HEALTH CARE EDUCATION/TRAINING PROGRAM
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
## 2024-06-05 ENCOUNTER — TELEPHONE (OUTPATIENT)
Dept: PHARMACY | Facility: MEDICAL CENTER | Age: 39
End: 2024-06-05
Payer: COMMERCIAL

## 2024-06-05 NOTE — TELEPHONE ENCOUNTER
Contact:            Phone number: [phone]    Name of person spoken with and relationship to patient: Tesha self  Patient’s Adherence:            How patient is doing on medication: doing well on both ajovy and nurtec    How many missed doses and reason: 0    Any new medications: YES- ADDERALL AND ZYTREC    Any new conditions: No    Any new allergies: No    Any new side effects: No    Any new diagnoses: No    How many doses remainin ajovy, 8 tabs nurtec    Did patient want to speak with pharmacist: No  Delivery:            Delivery date and method: ALREADY SENT OUT 24 [employer]    Needs by Date:    Signature required: NA    Any additional details for : NA  Next Injection Date (if applicable): injects on the   Shipping Address: [address] [place] [address]  [facility] [id number]  Medication (name, strength and dose): AJOVY SOLUTION PREFILLED SYRINGE 225 MG/1.5ML, NURTEC TABLET DISINTEGRATING 75 MG  Copay: $15  Payment Method:   2247  Supplies: None  Additional Information:    NO QUESTIONS OR CONCERNS AT THIS TIME

## 2024-06-10 ENCOUNTER — TELEPHONE (OUTPATIENT)
Dept: PHARMACY | Facility: MEDICAL CENTER | Age: 39
End: 2024-06-10
Payer: COMMERCIAL

## 2024-06-10 PROCEDURE — RXMED WILLOW AMBULATORY MEDICATION CHARGE: Performed by: PSYCHIATRY & NEUROLOGY

## 2024-06-10 NOTE — TELEPHONE ENCOUNTER
Fremanezumab-vfrm (AJOVY) 225 MG/1.5ML Solution Prefilled Syringe    Received Renewal PA request via  Teams   for Ajovy. (Quantity:1.5, Day Supply:30)     Insurance: [employer]  Member ID:  [id number]  BIN: [id number]  PCN: [id number]  Group: [id number]     Ran Test claim via [place] & medication Rejects stating prior authorization is required.    Initiated PA in [facility] (Key: [id number])    Prior Authorization for Ajovy has been approved for a quantity of 1.5ml , day supply 30    Prior Authorization reference number: [employer] [id number]  Insurance: [employer]  Effective dates:  06/10/2024 to 06/10/2025  Copay: $60     Is patient eligible to fill with [facility]? Yes    Next Steps: The patient's copay exceeds $5.00. Proceed with contacting patient to offer financial assistance.

## 2024-06-10 NOTE — TELEPHONE ENCOUNTER
Rimegepant Sulfate (NURTEC) 75 MG TABLET DISPERSIBLE    Received Renewal PA request via  Teams   for Nurtec. (Quantity:8, Day Supply:30)     Insurance: [employer]  Member ID:  [id number]  BIN: [id number]  PCN: [id number]  Group: [id number]     Ran Test claim via [place] & medication Rejects stating prior authorization is required.      Initiated PA in [facility] (Key: [id number])      Prior Authorization for Nurtec has been approved for a quantity of 8 , day supply 30    Prior Authorization reference number: PA# [employer] [id number] SS   Insurance: [employer]  Effective dates: 06/10/2024 to 06/10/2025  Copay: $0- Pharma has applied paid $60.00 toward your copay voucher to reduce the patients copay. Mention sponsorship to patient      Is patient eligible to fill with [facility]? Yes    Next Steps: The Patients copay is less than $5.00. Will contact the patient to determine choice of pharmacy, if applicable.

## 2024-06-11 ENCOUNTER — OFFICE VISIT (OUTPATIENT)
Dept: BEHAVIORAL HEALTH | Facility: PSYCHIATRIC FACILITY | Age: 39
End: 2024-06-11
Payer: COMMERCIAL

## 2024-06-11 DIAGNOSIS — F90.2 ADHD (ATTENTION DEFICIT HYPERACTIVITY DISORDER), COMBINED TYPE: ICD-10-CM

## 2024-06-11 DIAGNOSIS — F41.1 GENERALIZED ANXIETY DISORDER: ICD-10-CM

## 2024-06-11 DIAGNOSIS — F43.10 PTSD (POST-TRAUMATIC STRESS DISORDER): ICD-10-CM

## 2024-06-11 PROCEDURE — 90834 PSYTX W PT 45 MINUTES: CPT | Performed by: STUDENT IN AN ORGANIZED HEALTH CARE EDUCATION/TRAINING PROGRAM

## 2024-06-11 RX ORDER — GUANFACINE 1 MG/1
1 TABLET ORAL
Qty: 90 TABLET | Refills: 1 | Status: SHIPPED | OUTPATIENT
Start: 2024-06-11

## 2024-06-11 RX ORDER — DEXTROAMPHETAMINE SACCHARATE, AMPHETAMINE ASPARTATE, DEXTROAMPHETAMINE SULFATE AND AMPHETAMINE SULFATE 5; 5; 5; 5 MG/1; MG/1; MG/1; MG/1
20 TABLET ORAL
Qty: 30 TABLET | Refills: 0 | Status: SHIPPED | OUTPATIENT
Start: 2024-06-11 | End: 2024-07-11

## 2024-06-11 RX ORDER — DEXTROAMPHETAMINE SACCHARATE, AMPHETAMINE ASPARTATE MONOHYDRATE, DEXTROAMPHETAMINE SULFATE AND AMPHETAMINE SULFATE 5; 5; 5; 5 MG/1; MG/1; MG/1; MG/1
20 CAPSULE, EXTENDED RELEASE ORAL EVERY MORNING
Qty: 30 CAPSULE | Refills: 0 | Status: SHIPPED | OUTPATIENT
Start: 2024-06-11 | End: 2024-07-11
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
## 2024-06-19 ENCOUNTER — OFFICE VISIT (OUTPATIENT)
Dept: BEHAVIORAL HEALTH | Facility: PSYCHIATRIC FACILITY | Age: 39
End: 2024-06-19
Payer: COMMERCIAL

## 2024-06-19 DIAGNOSIS — F90.2 ADHD (ATTENTION DEFICIT HYPERACTIVITY DISORDER), COMBINED TYPE: ICD-10-CM

## 2024-06-19 DIAGNOSIS — F43.10 PTSD (POST-TRAUMATIC STRESS DISORDER): ICD-10-CM

## 2024-06-19 DIAGNOSIS — Z65.8 INTERPERSONAL PROBLEM: ICD-10-CM

## 2024-06-19 PROCEDURE — 90792 PSYCH DIAG EVAL W/MED SRVCS: CPT
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
## 2024-06-20 NOTE — PSYCHOTHERAPY
6/19: 1st visit with kecia, animals >> humans. Seems to not value daughter as much. Low self esteem, look for borderline personality and traits, but ptsd makes it a cloudy picture.

## 2024-07-03 ENCOUNTER — OFFICE VISIT (OUTPATIENT)
Dept: BEHAVIORAL HEALTH | Facility: PSYCHIATRIC FACILITY | Age: 39
End: 2024-07-03
Payer: COMMERCIAL

## 2024-07-03 DIAGNOSIS — F41.1 GENERALIZED ANXIETY DISORDER: ICD-10-CM

## 2024-07-03 DIAGNOSIS — Z65.8 INTERPERSONAL PROBLEM: ICD-10-CM

## 2024-07-03 DIAGNOSIS — F33.0 MILD EPISODE OF RECURRENT MAJOR DEPRESSIVE DISORDER (HCC): ICD-10-CM

## 2024-07-03 PROCEDURE — 90837 PSYTX W PT 60 MINUTES: CPT

## 2024-07-05 PROBLEM — Z65.8 INTERPERSONAL PROBLEM: Status: ACTIVE | Noted: 2024-07-05

## 2024-07-10 ENCOUNTER — OFFICE VISIT (OUTPATIENT)
Dept: BEHAVIORAL HEALTH | Facility: PSYCHIATRIC FACILITY | Age: 39
End: 2024-07-10
Payer: COMMERCIAL

## 2024-07-10 DIAGNOSIS — Z65.8 INTERPERSONAL PROBLEM: ICD-10-CM

## 2024-07-10 DIAGNOSIS — F90.2 ATTENTION DEFICIT HYPERACTIVITY DISORDER (ADHD), COMBINED TYPE: ICD-10-CM

## 2024-07-10 DIAGNOSIS — F41.1 GENERALIZED ANXIETY DISORDER: ICD-10-CM

## 2024-07-10 DIAGNOSIS — F43.10 POSTTRAUMATIC STRESS DISORDER: ICD-10-CM

## 2024-07-11 ENCOUNTER — PHARMACY VISIT (OUTPATIENT)
Dept: PHARMACY | Facility: MEDICAL CENTER | Age: 39
End: 2024-07-11
Payer: MEDICARE

## 2024-07-17 ENCOUNTER — OFFICE VISIT (OUTPATIENT)
Dept: BEHAVIORAL HEALTH | Facility: PSYCHIATRIC FACILITY | Age: 39
End: 2024-07-17
Payer: COMMERCIAL

## 2024-07-17 DIAGNOSIS — Z65.8 INTERPERSONAL PROBLEM: ICD-10-CM

## 2024-07-17 DIAGNOSIS — F90.2 ATTENTION DEFICIT HYPERACTIVITY DISORDER (ADHD), COMBINED TYPE: ICD-10-CM

## 2024-07-17 DIAGNOSIS — F43.10 POSTTRAUMATIC STRESS DISORDER: ICD-10-CM

## 2024-07-17 PROCEDURE — 90837 PSYTX W PT 60 MINUTES: CPT

## 2024-07-21 DIAGNOSIS — G43.719 INTRACTABLE CHRONIC MIGRAINE WITHOUT AURA AND WITHOUT STATUS MIGRAINOSUS: ICD-10-CM

## 2024-07-22 DIAGNOSIS — F90.2 ADHD (ATTENTION DEFICIT HYPERACTIVITY DISORDER), COMBINED TYPE: ICD-10-CM

## 2024-07-22 RX ORDER — DEXTROAMPHETAMINE SACCHARATE, AMPHETAMINE ASPARTATE MONOHYDRATE, DEXTROAMPHETAMINE SULFATE AND AMPHETAMINE SULFATE 5; 5; 5; 5 MG/1; MG/1; MG/1; MG/1
20 CAPSULE, EXTENDED RELEASE ORAL EVERY MORNING
Qty: 30 CAPSULE | Refills: 0 | Status: SHIPPED | OUTPATIENT
Start: 2024-07-22 | End: 2024-08-21

## 2024-07-22 RX ORDER — RIMEGEPANT SULFATE 75 MG/75MG
75 TABLET, ORALLY DISINTEGRATING ORAL
Qty: 8 TABLET | Refills: 3 | OUTPATIENT
Start: 2024-07-22 | End: 2024-08-21

## 2024-07-24 ENCOUNTER — OFFICE VISIT (OUTPATIENT)
Dept: BEHAVIORAL HEALTH | Facility: PSYCHIATRIC FACILITY | Age: 39
End: 2024-07-24
Payer: COMMERCIAL

## 2024-07-24 DIAGNOSIS — Z65.8 INTERPERSONAL PROBLEM: ICD-10-CM

## 2024-07-24 DIAGNOSIS — F41.1 GENERALIZED ANXIETY DISORDER: ICD-10-CM

## 2024-07-24 DIAGNOSIS — F43.10 PTSD (POST-TRAUMATIC STRESS DISORDER): ICD-10-CM

## 2024-07-24 DIAGNOSIS — F90.2 ADHD (ATTENTION DEFICIT HYPERACTIVITY DISORDER), COMBINED TYPE: ICD-10-CM

## 2024-07-28 PROBLEM — F90.2 ADHD (ATTENTION DEFICIT HYPERACTIVITY DISORDER), COMBINED TYPE: Status: ACTIVE | Noted: 2024-07-28

## 2024-08-12 ENCOUNTER — OFFICE VISIT (OUTPATIENT)
Dept: URGENT CARE | Facility: CLINIC | Age: 39
End: 2024-08-12
Payer: COMMERCIAL

## 2024-08-12 VITALS
TEMPERATURE: 97.9 F | HEIGHT: 59 IN | OXYGEN SATURATION: 96 % | BODY MASS INDEX: 26.21 KG/M2 | DIASTOLIC BLOOD PRESSURE: 80 MMHG | WEIGHT: 130 LBS | HEART RATE: 99 BPM | RESPIRATION RATE: 18 BRPM | SYSTOLIC BLOOD PRESSURE: 124 MMHG

## 2024-08-12 DIAGNOSIS — R09.82 POSTNASAL DRIP: ICD-10-CM

## 2024-08-12 DIAGNOSIS — J02.9 SORE THROAT: ICD-10-CM

## 2024-08-12 LAB — S PYO DNA SPEC NAA+PROBE: NOT DETECTED

## 2024-08-12 PROCEDURE — 99213 OFFICE O/P EST LOW 20 MIN: CPT | Performed by: PHYSICIAN ASSISTANT

## 2024-08-12 PROCEDURE — 87651 STREP A DNA AMP PROBE: CPT | Performed by: PHYSICIAN ASSISTANT

## 2024-08-12 RX ORDER — DEXAMETHASONE SODIUM PHOSPHATE 10 MG/ML
10 INJECTION INTRAMUSCULAR; INTRAVENOUS ONCE
Status: COMPLETED | OUTPATIENT
Start: 2024-08-12 | End: 2024-08-12

## 2024-08-12 RX ADMIN — DEXAMETHASONE SODIUM PHOSPHATE 10 MG: 10 INJECTION INTRAMUSCULAR; INTRAVENOUS at 09:48

## 2024-08-12 ASSESSMENT — ENCOUNTER SYMPTOMS
FEVER: 0
SORE THROAT: 1
COUGH: 0
CHILLS: 0

## 2024-08-12 ASSESSMENT — FIBROSIS 4 INDEX: FIB4 SCORE: 0.69

## 2024-08-12 NOTE — PROGRESS NOTES
Subjective:   Tesha Mason is a 38 y.o. female who presents today with   Chief Complaint   Patient presents with    Sore Throat     X 10 days       Pharyngitis   This is a new problem. The current episode started 1 to 4 weeks ago. The problem has been unchanged. There has been no fever. Associated symptoms include congestion. Pertinent negatives include no coughing. She has tried gargles (antihistamine, lozenges) for the symptoms. The treatment provided mild relief.     COVID at home has been negative    PMH:  has a past medical history of Asthma, Bronchitis, and Head ache.  MEDS:   Current Outpatient Medications:     amphetamine-dextroamphetamine (ADDERALL XR, 20MG,) 20 MG per XR capsule, Take 1 Capsule by mouth every morning for 30 days., Disp: 30 Capsule, Rfl: 0    guanFACINE (TENEX) 1 MG Tab, Take 1 Tablet by mouth at bedtime., Disp: 90 Tablet, Rfl: 1    sertraline (ZOLOFT) 100 MG Tab, Take 2 Tablets by mouth every day., Disp: 180 Tablet, Rfl: 1    Fremanezumab-vfrm (AJOVY) 225 MG/1.5ML Solution Prefilled Syringe, Inject 225 mg under the skin every 30 (thirty) days for 360 days., Disp: 1.5 mL, Rfl: 11    albuterol 108 (90 Base) MCG/ACT Aero Soln inhalation aerosol, INHALE 2 (TWO) PUFFS INTO THE LUNGS EVERY 4-6 HOURS AS NEEDED, Disp: , Rfl:     fluticasone (FLONASE) 50 MCG/ACT nasal spray, Spray 1 Spray in nose every day., Disp: , Rfl:     NON SPECIFIED, 1 Each by Injection route 2X A WEEK. Allergy shot 2x per week , Disp: , Rfl:     fexofenadine (ALLEGRA) 180 MG tablet, Take 180 mg by mouth Once., Disp: , Rfl:   ALLERGIES:   Allergies   Allergen Reactions    Hydrocodone Unspecified     Grinding teeth and eyes rolled back.     SURGHX:   Past Surgical History:   Procedure Laterality Date    [place] LAP,DIAGNOSTIC ABDOMEN  12/5/2019    Procedure: PELVISCOPY;  Surgeon: Celso Burnett M.D.;  Location: SURGERY [facility];  Service: Gynecology    [place] REMOVE INTRAUTERINE DEVICE  12/5/2019    Procedure:  "REMOVAL, INTRAUTERINE DEVICE;  Surgeon: Celso Burnett M.D.;  Location: SURGERY [facility];  Service: Gynecology    SALPINGECTOMY Bilateral 12/5/2019    Procedure: SALPINGECTOMY;  Surgeon: Celso Burnett M.D.;  Location: SURGERY [facility];  Service: Gynecology    GYN SURGERY  2010, 2012    laparoscopy    OTHER      tonsillectomy    OTHER      wisdom teeth removal 2006     SOCHX:  reports that she has never smoked. She has never used smokeless tobacco. She reports current alcohol use of about 0.6 oz of alcohol per week. She reports that she does not currently use drugs after having used the following drugs: Marijuana.  FH: Reviewed with patient, not pertinent to this visit.       Review of Systems   Constitutional:  Negative for chills and fever.   HENT:  Positive for congestion and sore throat.    Respiratory:  Negative for cough.         Objective:   /80   Pulse 99   Temp 36.6 °C (97.9 °F) (Temporal)   Resp 18   Ht 1.499 m (4' 11\")   Wt 59 kg (130 lb)   SpO2 96%   BMI 26.26 kg/m²   Physical Exam  Vitals and nursing note reviewed.   Constitutional:       General: She is not in acute distress.     Appearance: Normal appearance. She is well-developed. She is not ill-appearing or toxic-appearing.   HENT:      Head: Normocephalic and atraumatic.      Right Ear: Hearing, tympanic membrane and ear canal normal.      Left Ear: Hearing, tympanic membrane and ear canal normal.      Mouth/Throat:      Mouth: Mucous membranes are moist.      Pharynx: Posterior oropharyngeal erythema present. No oropharyngeal exudate.      Comments: Postnasal drip  Cardiovascular:      Rate and Rhythm: Normal rate and regular rhythm.      Heart sounds: Normal heart sounds.   Pulmonary:      Effort: Pulmonary effort is normal. No respiratory distress.      Breath sounds: Normal breath sounds. No stridor. No wheezing, rhonchi or rales.   Musculoskeletal:      Comments: Normal movement in all 4 extremities   Skin:   "   General: Skin is warm and dry.   Neurological:      Mental Status: She is alert.      Coordination: Coordination normal.   Psychiatric:         Mood and Affect: Mood normal.       STREP A -    Assessment/Plan:   Assessment    1. Postnasal drip    2. Sore throat  - dexamethasone (Decadron) injection (check route below) 10 mg  - POCT GROUP A STREP, PCR    Symptoms and presentation appear consistent with postnasal drip causing symptoms  Patient does have history consistent for allergies.  She has been using antihistamine over-the-counter.  Recommended also trialing Flonase as well.  We will rule out strep today.    Differential diagnosis, natural history, supportive care, and indications for immediate follow-up discussed.   Patient given instructions and understanding of medications and treatment.    If not improving in 3-5 days, F/U with PCP or return to UC if symptoms worsen.    Patient agreeable to plan.      Please note that this dictation was created using voice recognition software. I have made every reasonable attempt to correct obvious errors, but I expect that there are errors of grammar and possibly content that I did not discover before finalizing the note.    Nas Bernard PA-C

## 2024-08-14 ENCOUNTER — OFFICE VISIT (OUTPATIENT)
Dept: BEHAVIORAL HEALTH | Facility: PSYCHIATRIC FACILITY | Age: 39
End: 2024-08-14
Payer: COMMERCIAL

## 2024-08-14 DIAGNOSIS — F90.2 ATTENTION DEFICIT HYPERACTIVITY DISORDER (ADHD), COMBINED TYPE: ICD-10-CM

## 2024-08-14 DIAGNOSIS — F41.1 GENERALIZED ANXIETY DISORDER: ICD-10-CM

## 2024-08-14 DIAGNOSIS — F43.10 POSTTRAUMATIC STRESS DISORDER: ICD-10-CM

## 2024-08-14 DIAGNOSIS — Z65.8 INTERPERSONAL PROBLEM: ICD-10-CM

## 2024-08-14 PROCEDURE — 90837 PSYTX W PT 60 MINUTES: CPT | Mod: GC

## 2024-08-19 NOTE — PROGRESS NOTES
[facility]  Psychotherapy Summary Note    Full therapy note has been documented and is under restricted viewing.  Please see below for summary of today's session.     Date of Service: 08/14/2024  Appointment type: in-office appointment.  Attending:  Zaki Bishop M.D.    Type of session:Individual psychotherapy  Session start time: 3:58pm  Session stop time: 4:55pm  Length of time spent face to face with patient: 57 minutes  Persons in attendance: Patient    SI reported: no  HI reported: no    SUMMARY  Tesha Mason is a 38 y.o. old female who presents today for regularly scheduled psychotherapy for self-esteem, interpersonal relationship difficulties, and anxiety.      Symptoms currently being addressed in therapy: anxiety, behavioral dysregulation, interpersonal difficulty, impulsivity, and self esteem, life stressors    Therapeutic Intervention(s): Communication skills, Develop/modify treatment plan, Establish rapport, Goal-setting, Interpersonal effectiveness skills, Parenting skills, Parenting/familial roles addressed, Problem-solving, and Stressors assessed    CURRENT RISK ASSESSMENT       Suicide: Low       Homicide: Not applicable       Self-Harm: Not applicable       Relapse: Not applicable       Crisis Safety Plan Reviewed Not Indicated    DIAGNOSES/PLAN  Problem List Items Addressed This Visit          Psychiatry Problems    Attention deficit hyperactivity disorder (ADHD)    Posttraumatic stress disorder    Generalized anxiety disorder       Other    Interpersonal problem         Treatment Goal(s)/Objective(s) addressed: Tx plan:  Utilize learned skills to manage mood and emotional suffering more effectively  Learn to ameliorate effects of anxiety on life and functioning  Increase awareness of behaviors contributing to stressors  Increase awareness on behaviors contributing to anxiety  Help create plan on ways to progress towards goals and resolving some life stressors  Help create ways to  help future problem solving skills  Increase efficiency in communicating with significant other and family dynamics.     Progress toward Treatment Goals: Mild decline     Plan:  - Continue individual therapy    Kacey aSwant D.O.

## 2024-08-21 ENCOUNTER — OFFICE VISIT (OUTPATIENT)
Dept: BEHAVIORAL HEALTH | Facility: PSYCHIATRIC FACILITY | Age: 39
End: 2024-08-21
Payer: COMMERCIAL

## 2024-08-21 DIAGNOSIS — F90.2 ADHD (ATTENTION DEFICIT HYPERACTIVITY DISORDER), COMBINED TYPE: ICD-10-CM

## 2024-08-21 DIAGNOSIS — F41.1 GENERALIZED ANXIETY DISORDER: ICD-10-CM

## 2024-08-21 DIAGNOSIS — F43.10 POSTTRAUMATIC STRESS DISORDER: ICD-10-CM

## 2024-08-21 DIAGNOSIS — F90.2 ATTENTION DEFICIT HYPERACTIVITY DISORDER (ADHD), COMBINED TYPE: ICD-10-CM

## 2024-08-21 RX ORDER — DEXTROAMPHETAMINE SACCHARATE, AMPHETAMINE ASPARTATE MONOHYDRATE, DEXTROAMPHETAMINE SULFATE AND AMPHETAMINE SULFATE 5; 5; 5; 5 MG/1; MG/1; MG/1; MG/1
20 CAPSULE, EXTENDED RELEASE ORAL EVERY MORNING
Qty: 30 CAPSULE | Refills: 0 | Status: SHIPPED | OUTPATIENT
Start: 2024-09-11 | End: 2024-10-11

## 2024-08-21 RX ORDER — DEXTROAMPHETAMINE SACCHARATE, AMPHETAMINE ASPARTATE MONOHYDRATE, DEXTROAMPHETAMINE SULFATE AND AMPHETAMINE SULFATE 5; 5; 5; 5 MG/1; MG/1; MG/1; MG/1
20 CAPSULE, EXTENDED RELEASE ORAL EVERY MORNING
Qty: 30 CAPSULE | Refills: 0 | Status: SHIPPED | OUTPATIENT
Start: 2024-08-21 | End: 2024-09-20

## 2024-08-21 NOTE — PSYCHOTHERAPY
"[facility] PSYCHOTHERAPY NOTE    Today's Date: 08/14/2024  Supervising Attending: Zaki Bishop M.D.    CASE CONCEPTUALIZATION   Therapeutic Intervention(s): Dialectical behavior therapy modalities, Develop/modify treatment plan, Establish rapport, Goal-setting, and Maladaptive behavior addressed     Description of Presenting Problem:    Theory for how/why the problem has arisen (BioPsychoSocial Model)    Barriers:     ASSESSMENT  First sentence could describe how you think pt's symptoms have changed and why. Second sentence could describe what you think pt needs to continue making change.      TREATMENT PLAN  Goal(s) of Treatment:     Plan to Achieve Goal(s):      Progress toward Treatment Goals: Return to baseline     Plan:  - Target for next session: Story telling, playing instead of just doing supportive therapy. Self-identity more. Need to work on labelling emotions.   Maybe bring in  at some point in time.     7/17: Will have meeting with Renee Julio about opening the relationship. Will need to discuss with patient in an egocentric way how that may be difficult and how to set boundaries. Also, what are her boundaries as a way to engage better balance in life and decrease anxiety.    7/10:   When I asked her the goals: she stated 1. Help with self confidence and paranoia about exes. She wants to work on certain things in her relationship, like \"getting back together with Sumanth\" not aware of how it functions with family.   Stated knowing patronus helped, comparing stuff to help realize emotions and situations.   Self confidence means to her: not afraid to speak mind, overly apologetic, feeling good about self, not questioning self.    7/5: ASK her what the goals are for the session. What Helped with Dr. Reyes, what were you guys working on? What did you get from therapy? What do you want to continue to work on? How do you want to work on those things? What does that mean? What's " self-esteem to you? What are good ways to work on that?    Did not get a good idea of what she was supposed to do for her child, unfortunately. She doesn't understand her neglect and the purpose of paying attention to her kid, she thinks there needs to be routine.     7/24: Continue focusing on patient's egocentrism. Focus on wants for self-esteem and relationship with spouse but maybe Sumanth?  Continue to work on how to help with family dynamic through patient's egocentrism and congratulate her on her work when she thinks of plans.    8/14: Focus; is this your plan for her or what she wants for herself? Progress her towards her goals with some confrontations. Supportive therapy only can do so much.

## 2024-08-22 NOTE — PROGRESS NOTES
[facility]  Psychotherapy Summary Note    Full therapy note has been documented and is under restricted viewing.  Please see below for summary of today's session.     Date of Service: 08/21/2024  Appointment type: in-office appointment.  Attending:  Zaki Bishop M.D.    Type of session:Medication management with psychotherapy  Session start time: 4:00pm  Session stop time: 4:55pm  Length of time spent face to face with patient: 55 minutes  Persons in attendance: Patient    SI reported: no  HI reported: no    SUMMARY  Tesha Mason is a 38 y.o. old female who presents today for regularly scheduled psychotherapy for interpersonal difficulties, family dynamic strategies, and self esteem.    Symptoms currently being addressed in therapy: anxiety, behavioral dysregulation, social anxiety, interpersonal difficulty, poor insight, impulsivity, and poor distress tolerance    Therapeutic Intervention(s): Communication skills, Conflict clarification, Conflict resolution skills, Develop/modify treatment plan, Distress tolerance skills, Establish rapport, Interpersonal effectiveness skills, Maladaptive behavior addressed, Parenting/familial roles addressed, Positive behavior reinforced, Problem-solving, Review treatment plan, Role-playing, Stressors assessed, Supportive psychotherapy, Therapeutic relationship, and Therapeutic storytelling    CURRENT RISK ASSESSMENT       Suicide: Not applicable       Homicide: Not applicable       Self-Harm: Not applicable       Relapse: Not applicable       Crisis Safety Plan Reviewed Not Indicated    DIAGNOSES/PLAN  Problem List Items Addressed This Visit          Psychiatry Problems    Attention deficit hyperactivity disorder (ADHD)     Problem type: Chronic Illness with exacerbation, progression, or side effects of treatment    Assessment: Patient diagnosed with ADHD, combined type with past provider. Efficacy seen with ADDERAL XR 20mg. Possible titration, however currently  in process of regulating schedule for patient to consistently take medications.    Plan  Medication:   -ADDERALL XR 20mg daily    Therapy: weekly psychotherapy    Other Orders: n/a          Posttraumatic stress disorder    Generalized anxiety disorder    ADHD (attention deficit hyperactivity disorder), combined type    Relevant Medications    amphetamine-dextroamphetamine (ADDERALL XR, 20MG,) 20 MG per XR capsule (Start on 9/11/2024)    amphetamine-dextroamphetamine (ADDERALL XR, 20MG,) 20 MG per XR capsule         Treatment Goal(s)/Objective(s) addressed: Tx plan:  Increase awareness of prior learned skills in therapy from past therapist  Learn to be more present in arguments and reciting events to figure out causes of stress  Learn to identify emotions, values, and goals  Increase behaviors of self-compassion and self-care  Continue to work on emotional identifying and story telling to help with processing life events.  Targeting self-esteem through increasing in self-efficacy with primary goals in life.  Figure out more values/wants in life.     Progress toward Treatment Goals: Mild improvement     Plan:  - Continue individual therapy    Kacey Sawant D.O.

## 2024-08-26 NOTE — ASSESSMENT & PLAN NOTE
Problem type: Chronic Illness with exacerbation, progression, or side effects of treatment    Assessment: Patient diagnosed with ADHD, combined type with past provider. Efficacy seen with ADDERAL XR 20mg. Possible titration, however currently in process of regulating schedule for patient to consistently take medications.    Plan  Medication:   -ADDERALL XR 20mg daily    Therapy: weekly psychotherapy    Other Orders: n/a

## 2024-08-26 NOTE — PSYCHOTHERAPY
"[facility] PSYCHOTHERAPY NOTE    Today's Date: 08/21/2024  Supervising Attending: Zaki Bishop M.D.    CASE CONCEPTUALIZATION   Therapeutic Intervention(s): Dialectical behavior therapy modalities, Develop/modify treatment plan, Establish rapport, Goal-setting, and Maladaptive behavior addressed     Description of Presenting Problem:    Theory for how/why the problem has arisen (BioPsychoSocial Model)    Barriers:     ASSESSMENT  First sentence could describe how you think pt's symptoms have changed and why. Second sentence could describe what you think pt needs to continue making change.      TREATMENT PLAN  Goal(s) of Treatment:     Plan to Achieve Goal(s):      Progress toward Treatment Goals: Return to baseline     Plan:  - Target for next session: Story telling, playing instead of just doing supportive therapy. Self-identity more. Need to work on labelling emotions.   Maybe bring in  at some point in time.     7/17: Will have meeting with Renee Julio about opening the relationship. Will need to discuss with patient in an egocentric way how that may be difficult and how to set boundaries. Also, what are her boundaries as a way to engage better balance in life and decrease anxiety.    7/10:   When I asked her the goals: she stated 1. Help with self confidence and paranoia about exes. She wants to work on certain things in her relationship, like \"getting back together with Sumanth\" not aware of how it functions with family.   Stated knowing patronus helped, comparing stuff to help realize emotions and situations.   Self confidence means to her: not afraid to speak mind, overly apologetic, feeling good about self, not questioning self.    7/5: ASK her what the goals are for the session. What Helped with Dr. Reyes, what were you guys working on? What did you get from therapy? What do you want to continue to work on? How do you want to work on those things? What does that mean? What's " self-esteem to you? What are good ways to work on that?    Did not get a good idea of what she was supposed to do for her child, unfortunately. She doesn't understand her neglect and the purpose of paying attention to her kid, she thinks there needs to be routine.     7/24: Continue focusing on patient's egocentrism. Focus on wants for self-esteem and relationship with spouse but maybe Sumanth?  Continue to work on how to help with family dynamic through patient's egocentrism and congratulate her on her work when she thinks of plans.    8/14: Focus; is this your plan for her or what she wants for herself? Progress her towards her goals with some confrontations. Supportive therapy only can do so much.

## 2024-08-28 ENCOUNTER — APPOINTMENT (OUTPATIENT)
Dept: BEHAVIORAL HEALTH | Facility: PSYCHIATRIC FACILITY | Age: 39
End: 2024-08-28
Payer: COMMERCIAL

## 2024-08-28 DIAGNOSIS — Z65.8 INTERPERSONAL PROBLEM: ICD-10-CM

## 2024-08-28 DIAGNOSIS — F90.2 ATTENTION DEFICIT HYPERACTIVITY DISORDER (ADHD), COMBINED TYPE: ICD-10-CM

## 2024-08-28 DIAGNOSIS — F43.10 POSTTRAUMATIC STRESS DISORDER: ICD-10-CM

## 2024-08-28 PROCEDURE — 90837 PSYTX W PT 60 MINUTES: CPT | Mod: GC

## 2024-09-04 NOTE — PROGRESS NOTES
[facility]  Psychotherapy Summary Note    Full therapy note has been documented and is under restricted viewing.  Please see below for summary of today's session.     Date of Service: 08/28/2024  Appointment type: in-office appointment.  Attending:  Zaki Bishop M.D.    Type of session:Individual psychotherapy and medication management  Session start time: 4:03pm  Session stop time: 5:00pm  Length of time spent face to face with patient: 57 minutes  Persons in attendance: Patient    SI reported: no  HI reported: no    SUMMARY  Tesha Mason is a 38 y.o. old female who presents today for regularly scheduled psychotherapy for interpersonal difficulties and self-esteem.      Symptoms currently being addressed in therapy: anxiety, behavioral dysregulation, social anxiety, interpersonal difficulty, poor insight, impulsivity, and poor distress tolerance    Therapeutic Intervention(s): Communication skills, Conflict clarification, Conflict resolution skills, Establish rapport, Goal-setting, Interpersonal effectiveness skills, Maladaptive behavior addressed, Parenting/familial roles addressed, Stressors assessed, Supportive psychotherapy, and Therapeutic storytelling    CURRENT RISK ASSESSMENT       Suicide: Not applicable       Homicide: Not applicable       Self-Harm: Not applicable       Relapse: Not applicable       Crisis Safety Plan Reviewed Not Indicated    DIAGNOSES/PLAN  Problem List Items Addressed This Visit          Psychiatry Problems    Attention deficit hyperactivity disorder (ADHD)    Posttraumatic stress disorder       Other    Interpersonal problem         Treatment Goal(s)/Objective(s) addressed: Tx plan:  Increase awareness of social issues by recognizing early symptoms of conflicts  Increase awareness of anxiety by being more attentive of somatic changes  Learn how to problem solve with daily tasks in life  Increase awareness of other's perspective and how their affect may contribute to  interaction  Be conscious of wants/needs/goals/values to help with decision making.     Progress toward Treatment Goals: No change     Plan:  - Continue individual therapy    Kacey Sawant D.O.

## 2024-09-04 NOTE — PSYCHOTHERAPY
"[facility] PSYCHOTHERAPY NOTE    Today's Date: 08/28/2024  Supervising Attending: Zaki Bishop M.D.    CASE CONCEPTUALIZATION   Therapeutic Intervention(s): Dialectical behavior therapy modalities, Develop/modify treatment plan, Establish rapport, Goal-setting, and Maladaptive behavior addressed     Description of Presenting Problem:    Theory for how/why the problem has arisen (BioPsychoSocial Model)    Barriers:     ASSESSMENT  First sentence could describe how you think pt's symptoms have changed and why. Second sentence could describe what you think pt needs to continue making change.      TREATMENT PLAN  Goal(s) of Treatment:     Plan to Achieve Goal(s):      Progress toward Treatment Goals: Return to baseline     Plan:  - Target for next session: Story telling, playing instead of just doing supportive therapy. Self-identity more. Need to work on labelling emotions.   Maybe bring in  at some point in time.     7/17: Will have meeting with Renee Julio about opening the relationship. Will need to discuss with patient in an egocentric way how that may be difficult and how to set boundaries. Also, what are her boundaries as a way to engage better balance in life and decrease anxiety.    7/10:   When I asked her the goals: she stated 1. Help with self confidence and paranoia about exes. She wants to work on certain things in her relationship, like \"getting back together with Sumanth\" not aware of how it functions with family.   Stated knowing patronus helped, comparing stuff to help realize emotions and situations.   Self confidence means to her: not afraid to speak mind, overly apologetic, feeling good about self, not questioning self.    7/5: ASK her what the goals are for the session. What Helped with Dr. Reyes, what were you guys working on? What did you get from therapy? What do you want to continue to work on? How do you want to work on those things? What does that mean? What's " self-esteem to you? What are good ways to work on that?    Did not get a good idea of what she was supposed to do for her child, unfortunately. She doesn't understand her neglect and the purpose of paying attention to her kid, she thinks there needs to be routine.     7/24: Continue focusing on patient's egocentrism. Focus on wants for self-esteem and relationship with spouse but maybe Sumanth?  Continue to work on how to help with family dynamic through patient's egocentrism and congratulate her on her work when she thinks of plans.    8/14: Focus; is this your plan for her or what she wants for herself? Progress her towards her goals with some confrontations. Supportive therapy only can do so much.    8/28: this may one of last visits. Need to talk to patient about reason for starting therapy in the first place. Need to figure what to do forthward, may need to refer elsewhere.

## 2024-09-11 ENCOUNTER — OFFICE VISIT (OUTPATIENT)
Dept: BEHAVIORAL HEALTH | Facility: PSYCHIATRIC FACILITY | Age: 39
End: 2024-09-11
Payer: COMMERCIAL

## 2024-09-11 DIAGNOSIS — F43.10 POSTTRAUMATIC STRESS DISORDER: ICD-10-CM

## 2024-09-11 DIAGNOSIS — F60.9 CLUSTER B PERSONALITY DISORDER IN ADULT (HCC): ICD-10-CM

## 2024-09-11 DIAGNOSIS — F90.2 ATTENTION DEFICIT HYPERACTIVITY DISORDER (ADHD), COMBINED TYPE: ICD-10-CM

## 2024-09-11 DIAGNOSIS — F41.1 GENERALIZED ANXIETY DISORDER: ICD-10-CM

## 2024-09-11 PROCEDURE — 90834 PSYTX W PT 45 MINUTES: CPT

## 2024-09-15 PROBLEM — F60.9 CLUSTER B PERSONALITY DISORDER IN ADULT (HCC): Status: ACTIVE | Noted: 2024-09-15

## 2024-09-16 NOTE — PSYCHOTHERAPY
"[facility] PSYCHOTHERAPY NOTE    Today's Date: 09/11/2024  Supervising Attending: Zaki Bishop M.D.    CASE CONCEPTUALIZATION   Therapeutic Intervention(s): Dialectical behavior therapy modalities, Develop/modify treatment plan, Establish rapport, Goal-setting, and Maladaptive behavior addressed     Description of Presenting Problem:    Theory for how/why the problem has arisen (BioPsychoSocial Model)    Barriers:     ASSESSMENT  First sentence could describe how you think pt's symptoms have changed and why. Second sentence could describe what you think pt needs to continue making change.      TREATMENT PLAN  Goal(s) of Treatment:     Plan to Achieve Goal(s):      Progress toward Treatment Goals: Return to baseline     Plan:  - Target for next session: Story telling, playing instead of just doing supportive therapy. Self-identity more. Need to work on labelling emotions.   Maybe bring in  at some point in time.     7/17: Will have meeting with Renee Julio about opening the relationship. Will need to discuss with patient in an egocentric way how that may be difficult and how to set boundaries. Also, what are her boundaries as a way to engage better balance in life and decrease anxiety.    7/10:   When I asked her the goals: she stated 1. Help with self confidence and paranoia about exes. She wants to work on certain things in her relationship, like \"getting back together with Sumanth\" not aware of how it functions with family.   Stated knowing patronus helped, comparing stuff to help realize emotions and situations.   Self confidence means to her: not afraid to speak mind, overly apologetic, feeling good about self, not questioning self.    7/5: ASK her what the goals are for the session. What Helped with Dr. Reyes, what were you guys working on? What did you get from therapy? What do you want to continue to work on? How do you want to work on those things? What does that mean? What's " "self-esteem to you? What are good ways to work on that?    Did not get a good idea of what she was supposed to do for her child, unfortunately. She doesn't understand her neglect and the purpose of paying attention to her kid, she thinks there needs to be routine.     7/24: Continue focusing on patient's egocentrism. Focus on wants for self-esteem and relationship with spouse but maybe Sumanth?  Continue to work on how to help with family dynamic through patient's egocentrism and congratulate her on her work when she thinks of plans.    8/14: Focus; is this your plan for her or what she wants for herself? Progress her towards her goals with some confrontations. Supportive therapy only can do so much.    8/28: this may one of last visits. Need to talk to patient about reason for starting therapy in the first place. Need to figure what to do forthward, may need to refer elsewhere.    9/11: Problems with others and speculates not easily; need to think about the other side and I do it too much for her by leading her. Need to work out how she can use these skills outside of a therapy session. Maybe she can write things down?   I used the term \"being present\" earlier; maybe that's not appropriate, We need to figure out ways to record so she remembers. Need to figure out the functionality of therapy. Need to do some journaling maybe. Work on ONE thing at a time.  Also need to ask myself what's a reasonable timeline?  "

## 2024-09-16 NOTE — PROGRESS NOTES
[facility]  Psychotherapy Summary Note    Full therapy note has been documented and is under restricted viewing.  Please see below for summary of today's session.     Date of Service: 09/11/2024  Appointment type: in-office appointment.  Attending:  Zaki Bishop M.D.    Type of session:Individual psychotherapy  Session start time: 4:05pm  Session stop time: 4:55pm  Length of time spent face to face with patient: 50 minutes  Persons in attendance: Patient    SI reported: no  HI reported: no    SUMMARY  Tesha Mason is a 38 y.o. old female who presents today for regularly scheduled psychotherapy for self-esteem and interpersonal difficulties.      Symptoms currently being addressed in therapy: behavioral dysregulation, perspective taking, social anxiety, interpersonal difficulty, poor insight, and impulsivity    Therapeutic Intervention(s): Communication skills, Conflict clarification, Develop/modify treatment plan, Establish rapport, and Goal-setting    CURRENT RISK ASSESSMENT       Suicide: Not applicable       Homicide: Not applicable       Self-Harm: Not applicable       Relapse: Not applicable       Crisis Safety Plan Reviewed Not Indicated    DIAGNOSES/PLAN  Problem List Items Addressed This Visit          Psychiatry Problems    Attention deficit hyperactivity disorder (ADHD)    Posttraumatic stress disorder    Generalized anxiety disorder    Cluster B personality disorder in adult (HCC)         Treatment Goal(s)/Objective(s) addressed: Tx plan:  Utilize learned skills to manage mood and emotional suffering more effectively  Learn to successfully challenge & change distortions in thinking  Learn to ameliorate effects of anxiety on life and functioning  Increase behaviors of self-compassion and self-care   Increase awareness during interpersonal interactions and potential causes for difficulties  Increase ability to perspective take without the prompting of another individual  Work out reminders  to figure out how to better utilize skills worked in therapy  Figure out timeline on progress towards goals and expectations for further therapy work.     Progress toward Treatment Goals: No change     Plan:  - Continue individual therapy    Kacey Sawant D.O.

## 2024-09-18 ENCOUNTER — OFFICE VISIT (OUTPATIENT)
Dept: BEHAVIORAL HEALTH | Facility: PSYCHIATRIC FACILITY | Age: 39
End: 2024-09-18
Payer: COMMERCIAL

## 2024-09-18 DIAGNOSIS — F90.2 ATTENTION DEFICIT HYPERACTIVITY DISORDER (ADHD), COMBINED TYPE: ICD-10-CM

## 2024-09-18 DIAGNOSIS — F60.9 CLUSTER B PERSONALITY DISORDER IN ADULT (HCC): ICD-10-CM

## 2024-09-18 PROCEDURE — 90832 PSYTX W PT 30 MINUTES: CPT

## 2024-09-23 NOTE — PSYCHOTHERAPY
9/18: We switched patient to AM clinic and she seemed to take this well. We will be doing a closer follow up and what would be beneficial to figure out behavioral tactics to improve adherence.

## 2024-09-23 NOTE — PROGRESS NOTES
[facility]  Psychotherapy Summary Note    Full therapy note has been documented and is under restricted viewing.  Please see below for summary of today's session.     Date of Service: 09/18/2024  Appointment type: in-office appointment.  Attending:  Zaki Bishop M.D.    Type of session:Medication management with psychotherapy  Session start time: 4:02pm  Session stop time: 4:35pm  Length of time spent face to face with patient: 33 minutes  Persons in attendance: Patient    SI reported: no  HI reported: no    SUMMARY  Tesha Mason is a 38 y.o. old female who presents today for regularly scheduled psychotherapy for marital issues and self-esteem.  She states she has been taking the medications everyday for the past week but wasn't consistent before that, she notices the medications working as it can help her organize tasks at work and makes her less labile in emotion/less irritable. Would probably benefit from increasing but has difficulties taking regularly, is agreeable to moving to AM clinic and monthly checking in with titration for symptom relief.      Symptoms currently being addressed in therapy: anxiety, behavioral dysregulation, interpersonal difficulty, poor insight, and impulsivity    Therapeutic Intervention(s): Communication skills, Conflict clarification, and Develop/modify treatment plan    CURRENT RISK ASSESSMENT       Suicide: Not applicable       Homicide: Not applicable       Self-Harm: Not applicable       Relapse: Not applicable       Crisis Safety Plan Reviewed Not Indicated    DIAGNOSES/PLAN  Problem List Items Addressed This Visit          Psychiatry Problems    Attention deficit hyperactivity disorder (ADHD)    Cluster B personality disorder in adult (HCC)         Treatment Goal(s)/Objective(s) addressed: Tx plan:  Utilize learned skills to manage mood and emotional suffering more effectively  Learn to successfully challenge & change distortions in thinking  Learn to  ameliorate effects of anxiety on life and functioning  Increase behaviors of self-compassion and self-care  Increase awareness of surroundings with decreasing avoidant tendencies  Moving to AM clinic for follow up on medication management and finding out ways to increase adherence through behavioral tactics.     Progress toward Treatment Goals: Return to baseline     Plan:  - Continue individual therapy    Kacey Sawant D.O.

## 2024-09-30 DIAGNOSIS — F90.2 ADHD (ATTENTION DEFICIT HYPERACTIVITY DISORDER), COMBINED TYPE: ICD-10-CM

## 2024-10-02 RX ORDER — DEXTROAMPHETAMINE SACCHARATE, AMPHETAMINE ASPARTATE MONOHYDRATE, DEXTROAMPHETAMINE SULFATE AND AMPHETAMINE SULFATE 5; 5; 5; 5 MG/1; MG/1; MG/1; MG/1
20 CAPSULE, EXTENDED RELEASE ORAL EVERY MORNING
Qty: 30 CAPSULE | Refills: 0 | Status: SHIPPED | OUTPATIENT
Start: 2024-10-02 | End: 2024-10-23 | Stop reason: DRUGHIGH

## 2024-10-08 PROCEDURE — RXMED WILLOW AMBULATORY MEDICATION CHARGE: Performed by: PSYCHIATRY & NEUROLOGY

## 2024-10-17 ENCOUNTER — PHARMACY VISIT (OUTPATIENT)
Dept: PHARMACY | Facility: MEDICAL CENTER | Age: 39
End: 2024-10-17
Payer: MEDICARE

## 2024-10-23 ENCOUNTER — OFFICE VISIT (OUTPATIENT)
Dept: BEHAVIORAL HEALTH | Facility: PSYCHIATRIC FACILITY | Age: 39
End: 2024-10-23
Payer: COMMERCIAL

## 2024-10-23 VITALS
BODY MASS INDEX: 28.63 KG/M2 | DIASTOLIC BLOOD PRESSURE: 70 MMHG | OXYGEN SATURATION: 98 % | HEIGHT: 59 IN | WEIGHT: 142 LBS | HEART RATE: 114 BPM | SYSTOLIC BLOOD PRESSURE: 112 MMHG

## 2024-10-23 DIAGNOSIS — F90.2 ADHD (ATTENTION DEFICIT HYPERACTIVITY DISORDER), COMBINED TYPE: ICD-10-CM

## 2024-10-23 DIAGNOSIS — F10.20 ALCOHOL USE DISORDER, MODERATE, DEPENDENCE (HCC): ICD-10-CM

## 2024-10-23 DIAGNOSIS — F43.29 ADJUSTMENT DISORDER WITH DISTURBANCE OF EMOTION: ICD-10-CM

## 2024-10-23 DIAGNOSIS — F43.10 POSTTRAUMATIC STRESS DISORDER: ICD-10-CM

## 2024-10-23 DIAGNOSIS — F90.2 ATTENTION DEFICIT HYPERACTIVITY DISORDER (ADHD), COMBINED TYPE: ICD-10-CM

## 2024-10-23 PROCEDURE — 3074F SYST BP LT 130 MM HG: CPT

## 2024-10-23 PROCEDURE — 3078F DIAST BP <80 MM HG: CPT

## 2024-10-23 PROCEDURE — 99214 OFFICE O/P EST MOD 30 MIN: CPT | Mod: 25

## 2024-10-23 RX ORDER — DEXTROAMPHETAMINE SACCHARATE, AMPHETAMINE ASPARTATE MONOHYDRATE, DEXTROAMPHETAMINE SULFATE AND AMPHETAMINE SULFATE 7.5; 7.5; 7.5; 7.5 MG/1; MG/1; MG/1; MG/1
30 CAPSULE, EXTENDED RELEASE ORAL EVERY MORNING
Qty: 30 CAPSULE | Refills: 0 | Status: SHIPPED | OUTPATIENT
Start: 2024-11-27 | End: 2024-12-27

## 2024-10-23 RX ORDER — DEXTROAMPHETAMINE SACCHARATE, AMPHETAMINE ASPARTATE MONOHYDRATE, DEXTROAMPHETAMINE SULFATE AND AMPHETAMINE SULFATE 7.5; 7.5; 7.5; 7.5 MG/1; MG/1; MG/1; MG/1
30 CAPSULE, EXTENDED RELEASE ORAL EVERY MORNING
Qty: 30 CAPSULE | Refills: 0 | Status: SHIPPED | OUTPATIENT
Start: 2024-10-23 | End: 2024-11-22

## 2024-10-23 RX ORDER — DEXTROAMPHETAMINE SACCHARATE, AMPHETAMINE ASPARTATE MONOHYDRATE, DEXTROAMPHETAMINE SULFATE AND AMPHETAMINE SULFATE 7.5; 7.5; 7.5; 7.5 MG/1; MG/1; MG/1; MG/1
30 CAPSULE, EXTENDED RELEASE ORAL EVERY MORNING
Qty: 30 CAPSULE | Refills: 0 | Status: SHIPPED | OUTPATIENT
Start: 2024-11-06 | End: 2024-12-06

## 2024-10-23 ASSESSMENT — ENCOUNTER SYMPTOMS
VOMITING: 0
ABDOMINAL PAIN: 0
NAUSEA: 0
HEADACHES: 0
DIARRHEA: 0
CONSTIPATION: 0
DEPRESSION: 0

## 2024-10-23 ASSESSMENT — FIBROSIS 4 INDEX: FIB4 SCORE: 0.69

## 2024-11-07 DIAGNOSIS — G43.719 INTRACTABLE CHRONIC MIGRAINE WITHOUT AURA AND WITHOUT STATUS MIGRAINOSUS: ICD-10-CM

## 2024-11-07 PROCEDURE — RXMED WILLOW AMBULATORY MEDICATION CHARGE: Performed by: PSYCHIATRY & NEUROLOGY

## 2024-11-08 RX ORDER — RIMEGEPANT SULFATE 75 MG/75MG
75 TABLET, ORALLY DISINTEGRATING ORAL
Qty: 8 TABLET | Refills: 3 | OUTPATIENT
Start: 2024-11-08 | End: 2024-12-08

## 2024-11-26 ENCOUNTER — DOCUMENTATION (OUTPATIENT)
Dept: PHARMACY | Facility: MEDICAL CENTER | Age: 39
End: 2024-11-26
Payer: COMMERCIAL

## 2024-11-26 NOTE — PROGRESS NOTES
Follow Up Assessment   Dx: Intractable chronic migraine without aura and without status migrainosus [G43.719]      Tx prescribed: Ajovy 225mg/1.5mLs syringe injecting 1.5mLs (225mg) under the skin every 30 days   - Administration:  injecting Ajovy #1 syringe into back of her arm; rotating arms; no issues with administration    Adherence: trying to inject on the 12th of each month; has been late in prior months up to ~1 week late however normally is on track injecting on the 12th    - Missed dose mgmt: Reviewed if you forget to take Ajovy or are not able to take the dose at the regular time, take your missed dose as soon as you remember. After that, you can continue to take Ajovy 30 days from the date of your last dose       Current SE: none    - Mitigation/Mgmt: N/A as no SE reported       List Changes to Allergies, Diagnoses: sinus congestion (self-managed) + menstrual cycle changes      Current S/Sx: head pain + pressure + tension, nausea; some light/sound sensitivity   How many migraine days in the past month? 3  Pain severity in the past month: always   Avg duration of migraine in past month: < 4 hours   Clinically Relevant, Abnormal Labs: no new labs      - BP/HR: WNL (8/12/24)    Wellness/Lifestyle Counseling: eating regularly every few hours to stabilize blood sugars; well hydrated intaking 3-4x19oz bottles    - Support/QOL: 1 missed day of activities with migraine where cancelled plans with friends    - Immunizations: declined yearly flu + COVID booster       Med Rec/Updated drug list:   - EMR accurate, no medication list inaccuracies. Reviewed with patient.   DI Check: no clinically significant DDIs identified          Goals of Therapy:    - Promote or maintain optimal therapy administration and adherence   - Mitigation of side effects   - Reduce migraine frequency, duration, and/or severity    - Improve acute medication responsiveness and reduce need for acute attacks    - Identify and modify  migraine triggers   - Maintain function and ability to perform Activities of Daily Living (ADLs)  Progression toward goals - past late doses however she denied any missed/late doses over the last month; decreased migraines from prior assessment down to 3/month; continues responding well to Nurtec as needed with duration lasting < 4 hours; loud noises are a trigger where she will distance herself from loud environments and wear ear plugs; drinking decaf coffee daily and only having caffeinated soda with extreme migraines or if migraines occur while at work; 1 missed ADL over the past month    - Patient has agreed/understands to goals of therapy during education/counseling       Additional: I had a pleasant conversation with Tesha for annual check-in regarding her response to Ajovy. She has remained very pleased with her response in overall reduction in freuqency and severity of migraine attacks. 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 She will reserve caffeine consumption for rare use if she is working and cannot get away from work and will use ear plugs to reduce noise impact. She noted stopping her allergy shots temporarily as she has a lot going on at the moment. She continues with Allegra daily as needed for current allergy season. She asked for an update on her Nurtec prescription where I noted she needs to be seen by her neurology provider before a refill is issued. She acknowledged she is overdue for an appointment where she will call to schedule to limit therapy delays. She is also working to find a new PCP to establish care and has a list of providers to choose from. Tesha had no further questions during the call and expressed appreciation for the support services.

## 2024-12-18 ENCOUNTER — PHARMACY VISIT (OUTPATIENT)
Dept: PHARMACY | Facility: MEDICAL CENTER | Age: 39
End: 2024-12-18
Payer: MEDICARE

## 2024-12-18 ENCOUNTER — OFFICE VISIT (OUTPATIENT)
Dept: BEHAVIORAL HEALTH | Facility: PSYCHIATRIC FACILITY | Age: 39
End: 2024-12-18
Payer: COMMERCIAL

## 2024-12-18 VITALS
SYSTOLIC BLOOD PRESSURE: 112 MMHG | OXYGEN SATURATION: 98 % | HEIGHT: 59 IN | BODY MASS INDEX: 28.68 KG/M2 | DIASTOLIC BLOOD PRESSURE: 72 MMHG | HEART RATE: 95 BPM

## 2024-12-18 DIAGNOSIS — F10.20 ALCOHOL USE DISORDER, MODERATE, DEPENDENCE (HCC): ICD-10-CM

## 2024-12-18 DIAGNOSIS — F90.2 ADHD (ATTENTION DEFICIT HYPERACTIVITY DISORDER), COMBINED TYPE: ICD-10-CM

## 2024-12-18 DIAGNOSIS — F43.10 POSTTRAUMATIC STRESS DISORDER: ICD-10-CM

## 2024-12-18 DIAGNOSIS — F60.9 CLUSTER B PERSONALITY DISORDER IN ADULT (HCC): ICD-10-CM

## 2024-12-18 DIAGNOSIS — F12.20 CANNABIS USE DISORDER, MODERATE, DEPENDENCE (HCC): ICD-10-CM

## 2024-12-18 DIAGNOSIS — F41.1 GENERALIZED ANXIETY DISORDER: ICD-10-CM

## 2024-12-18 DIAGNOSIS — G43.719 INTRACTABLE CHRONIC MIGRAINE WITHOUT AURA AND WITHOUT STATUS MIGRAINOSUS: ICD-10-CM

## 2024-12-18 DIAGNOSIS — F90.2 ATTENTION DEFICIT HYPERACTIVITY DISORDER (ADHD), COMBINED TYPE: ICD-10-CM

## 2024-12-18 PROCEDURE — RXMED WILLOW AMBULATORY MEDICATION CHARGE: Performed by: PSYCHIATRY & NEUROLOGY

## 2024-12-18 PROCEDURE — 90833 PSYTX W PT W E/M 30 MIN: CPT

## 2024-12-18 PROCEDURE — 99214 OFFICE O/P EST MOD 30 MIN: CPT

## 2024-12-18 PROCEDURE — 3078F DIAST BP <80 MM HG: CPT

## 2024-12-18 PROCEDURE — 3074F SYST BP LT 130 MM HG: CPT

## 2024-12-18 PROCEDURE — 96127 BRIEF EMOTIONAL/BEHAV ASSMT: CPT

## 2024-12-18 RX ORDER — DEXTROAMPHETAMINE SACCHARATE, AMPHETAMINE ASPARTATE MONOHYDRATE, DEXTROAMPHETAMINE SULFATE AND AMPHETAMINE SULFATE 7.5; 7.5; 7.5; 7.5 MG/1; MG/1; MG/1; MG/1
30 CAPSULE, EXTENDED RELEASE ORAL EVERY MORNING
Qty: 30 CAPSULE | Refills: 0 | Status: SHIPPED | OUTPATIENT
Start: 2025-01-29 | End: 2025-02-28

## 2024-12-18 RX ORDER — DEXTROAMPHETAMINE SACCHARATE, AMPHETAMINE ASPARTATE MONOHYDRATE, DEXTROAMPHETAMINE SULFATE AND AMPHETAMINE SULFATE 7.5; 7.5; 7.5; 7.5 MG/1; MG/1; MG/1; MG/1
30 CAPSULE, EXTENDED RELEASE ORAL EVERY MORNING
Qty: 30 CAPSULE | Refills: 0 | Status: SHIPPED | OUTPATIENT
Start: 2024-12-29 | End: 2025-01-28

## 2024-12-18 RX ORDER — DEXTROAMPHETAMINE SACCHARATE, AMPHETAMINE ASPARTATE MONOHYDRATE, DEXTROAMPHETAMINE SULFATE AND AMPHETAMINE SULFATE 7.5; 7.5; 7.5; 7.5 MG/1; MG/1; MG/1; MG/1
30 CAPSULE, EXTENDED RELEASE ORAL EVERY MORNING
Qty: 30 CAPSULE | Refills: 0 | Status: SHIPPED | OUTPATIENT
Start: 2025-02-25 | End: 2025-03-27

## 2024-12-18 RX ORDER — RIMEGEPANT SULFATE 75 MG/75MG
75 TABLET, ORALLY DISINTEGRATING ORAL
Qty: 8 TABLET | Refills: 3 | OUTPATIENT
Start: 2024-12-18 | End: 2025-01-17

## 2024-12-18 ASSESSMENT — ANXIETY QUESTIONNAIRES
5. BEING SO RESTLESS THAT IT IS HARD TO SIT STILL: NEARLY EVERY DAY
2. NOT BEING ABLE TO STOP OR CONTROL WORRYING: MORE THAN HALF THE DAYS
3. WORRYING TOO MUCH ABOUT DIFFERENT THINGS: SEVERAL DAYS
IF YOU CHECKED OFF ANY PROBLEMS ON THIS QUESTIONNAIRE, HOW DIFFICULT HAVE THESE PROBLEMS MADE IT FOR YOU TO DO YOUR WORK, TAKE CARE OF THINGS AT HOME, OR GET ALONG WITH OTHER PEOPLE: SOMEWHAT DIFFICULT
1. FEELING NERVOUS, ANXIOUS, OR ON EDGE: SEVERAL DAYS
6. BECOMING EASILY ANNOYED OR IRRITABLE: SEVERAL DAYS
4. TROUBLE RELAXING: MORE THAN HALF THE DAYS
GAD7 TOTAL SCORE: 12
7. FEELING AFRAID AS IF SOMETHING AWFUL MIGHT HAPPEN: MORE THAN HALF THE DAYS

## 2024-12-18 ASSESSMENT — ENCOUNTER SYMPTOMS
DIZZINESS: 1
HALLUCINATIONS: 0
NERVOUS/ANXIOUS: 1
HEADACHES: 1

## 2024-12-18 ASSESSMENT — PATIENT HEALTH QUESTIONNAIRE - PHQ9
CLINICAL INTERPRETATION OF PHQ2 SCORE: 3
SUM OF ALL RESPONSES TO PHQ QUESTIONS 1-9: 11
5. POOR APPETITE OR OVEREATING: 3 - NEARLY EVERY DAY

## 2024-12-18 NOTE — ASSESSMENT & PLAN NOTE
Problem type: Chronic Illness, Stable    Assessment: Patient currently taking 200 mg sertraline bypass provider, has extensive traumatic history with current hypervigilance, mood/irritability, difficulty concentration, poor self worth, paranoia.  Some of the symptoms are compounded by ADHD that was not treated in a organized regiment.  Will continue to monitor, patient last filled of sertraline 200 mg in May 2024.    Plan  Medication: Sertraline 200 mg    Therapy: N/A    Other Orders: N/A

## 2024-12-18 NOTE — ASSESSMENT & PLAN NOTE
"Problem type: Chronic Illness with exacerbation, progression, or side effects of treatment    Assessment: Has been using more for sleep every night, \"a quarter or half of a gummy.\" Due to recent passing of family members.     Plan  Medication: n/a    Therapy:  -Counseled for alternative sleep modalities    Other Orders: n/a   "

## 2024-12-18 NOTE — ASSESSMENT & PLAN NOTE
Problem type: Chronic Illness with exacerbation, progression, or side effects of treatment     Assessment: Patient diagnosed with ADHD, combined type with past provider. Efficacy seen with ADDERAL XR 20mg.  Patient has been steadily taking her medications every single day with correlation to refill times and  times.  Patient states that her medication was completely off by 5 PM and she takes it around 8 AM, although this is appropriate manner time for it to last, patient most likely only has efficacy into the early afternoon(1-2 o'clock) as her productivity diminishes after this, and states she receives probably 70% efficacy.  From 20mg Dosing    No side effects, increased 30mg last visit in October. States works until 7-8pm.      Plan  Medication:   -Continue 30mg Adderall XR daily     Therapy: n/a     Other Orders: n/a

## 2024-12-18 NOTE — PROGRESS NOTES
"[facility] Outpatient Psychiatric Follow Up Note  Evaluation completed by: Kacey Sawant D.O.   Date of Service: 12/18/2024  Appointment type: in-office appointment.  Attending:  Teresa Benz M.D.  Information below was collected from: patient    CHIEF COMPLIANT:  \"I think it's been a bad couple of months\"        HPI:   Tesha Mason is a 39 y.o. old female who presents today for regularly scheduled follow up for assessment of \"I don't know, but the meds are good.\"    Denies mood log, takes adderall xr everyday, takes sertraline everyday (improvement) from last session, misses guanfacine 2days/week improvement.  Describes hard few months due to recent passings of people in family and friends: 3 drinks a week compared to 1-2 a week, more use of cannabis to \"numb myself,\" ~\"a quarter to half of a cbd,\" increases in nightmares about deaths and ghosts,      Describes ink therapy and working more to help with coping of low mood.      THERAPY  Type of session:Medication management with psychotherapy  Session start time: 10:44am  Session stop time: 11:10 am  Length of time spent face to face with patient: 24 minutes  Persons in attendance:Patient    Therapeutic Intervention(s): Develop/modify treatment plan, Distress tolerance skills, Establish rapport, Goal-setting, Maladaptive behavior addressed, Psychoeducation regarding cannabis/cbd use, Supportive psychotherapy, Therapeutic storytelling, and intentional actions to help with coping of loss     Treatment Goal(s)/Objective(s) addressed: Tx plan:  Utilize learned skills to manage mood and emotional suffering more effectively  Learn to successfully be intentional in adaptive strategies  Learn to recognize maladaptive traits  Increase behaviors of self-compassion and self-care    Progress toward Treatment Goals: No change       PSYCHIATRIC REVIEW OF SYSTEMS:current symptoms as reported by pt.  Depression: Depressed mood, Difficulty sleeping, Sense " "of hopelessness, and Passive thoughts of death  [place]: Patient denies any change in mood, increased energy, or marked irritability  Anxiety/Panic Attacks: insomnia, racing thoughts, feelings of losing control, difficulty concentrating  Trauma: nightmares, negative beliefs of self/others/world, and reckless/self destructive behavior  Psychosis: Patient reports no signs or symptoms indicative of psychosis      CURRENT MEDICATIONS    Current Outpatient Medications:     [START ON 12/29/2024] amphetamine-dextroamphetamine ER (ADDERALL XR) 30 MG XR capsule, Take 1 Capsule by mouth every morning for 30 days., Disp: 30 Capsule, Rfl: 0    [START ON 1/29/2025] amphetamine-dextroamphetamine ER (ADDERALL XR) 30 MG XR capsule, Take 1 Capsule by mouth every morning for 30 days., Disp: 30 Capsule, Rfl: 0    [START ON 2/25/2025] amphetamine-dextroamphetamine ER (ADDERALL XR) 30 MG XR capsule, Take 1 Capsule by mouth every morning for 30 days., Disp: 30 Capsule, Rfl: 0    guanFACINE (TENEX) 1 MG Tab, Take 1 Tablet by mouth at bedtime., Disp: 90 Tablet, Rfl: 1    sertraline (ZOLOFT) 100 MG Tab, Take 2 Tablets by mouth every day., Disp: 180 Tablet, Rfl: 1    Fremanezumab-vfrm (AJOVY) 225 MG/1.5ML Solution Prefilled Syringe, Inject 225 mg under the skin every 30 (thirty) days for 360 days., Disp: 1.5 mL, Rfl: 11    albuterol 108 (90 Base) MCG/ACT Aero Soln inhalation aerosol, INHALE 2 (TWO) PUFFS INTO THE LUNGS EVERY 4-6 HOURS AS NEEDED, Disp: , Rfl:     fluticasone (FLONASE) 50 MCG/ACT nasal spray, Spray 1 Spray in nose every day., Disp: , Rfl:     NON SPECIFIED, 1 Each by Injection route 2X A WEEK. Allergy shot 2x per week , Disp: , Rfl:     fexofenadine (ALLEGRA) 180 MG tablet, Take 180 mg by mouth Once., Disp: , Rfl:      REVIEW OF SYSTEMS   Review of Systems   Neurological:  Positive for dizziness (\"minor the last few days, but I'm fully recooping from planes and boats\") and headaches (\"a little one earlier but nothing major\"). " "  Psychiatric/Behavioral:  Positive for suicidal ideas (chronic \"just about the same, a little higher recently\"). Negative for hallucinations. The patient is nervous/anxious.      Neurologic: no tics, tremors, dyskinesias. The patient denies dizziness, syncope, falls. Ambulates independently    PAST MEDICAL HISTORY  Past Medical History:   Diagnosis Date    Asthma     prn inhaler    Bronchitis     2009    Head ache      Allergies   Allergen Reactions    Hydrocodone Unspecified     Grinding teeth and eyes rolled back.     Past Surgical History:   Procedure Laterality Date    [place] LAP,DIAGNOSTIC ABDOMEN  12/5/2019    Procedure: PELVISCOPY;  Surgeon: Celso Burnett M.D.;  Location: SURGERY [facility];  Service: Gynecology    [place] REMOVE INTRAUTERINE DEVICE  12/5/2019    Procedure: REMOVAL, INTRAUTERINE DEVICE;  Surgeon: Celso Burnett M.D.;  Location: [facility];  Service: Gynecology    SALPINGECTOMY Bilateral 12/5/2019    Procedure: SALPINGECTOMY;  Surgeon: Celso Burnett M.D.;  Location: [facility];  Service: Gynecology    GYN SURGERY  2010, 2012    laparoscopy    OTHER      tonsillectomy    OTHER      wisdom teeth removal 2006      No family history on file.  Social History     Socioeconomic History    Marital status:    Tobacco Use    Smoking status: Never    Smokeless tobacco: Never   Vaping Use    Vaping status: Never Used   Substance and Sexual Activity    Alcohol use: Yes     Alcohol/week: 0.6 oz     Types: 1 Shots of liquor per week    Drug use: Not Currently     Types: Marijuana     Comment: marijuana 1x per week     Past Surgical History:   Procedure Laterality Date    KY LAP,DIAGNOSTIC ABDOMEN  12/5/2019    Procedure: PELVISCOPY;  Surgeon: Celso Burnett M.D.;  Location: [facility];  Service: Gynecology    [place] REMOVE INTRAUTERINE DEVICE  12/5/2019    Procedure: REMOVAL, INTRAUTERINE DEVICE;  Surgeon: Celso Burnett M.D.;  Location: SURGERY " "[facility];  Service: Gynecology    SALPINGECTOMY Bilateral 12/5/2019    Procedure: SALPINGECTOMY;  Surgeon: Celso Burnett M.D.;  Location: SURGERY [facility];  Service: Gynecology    GYN SURGERY  2010, 2012    laparoscopy    OTHER      tonsillectomy    OTHER      wisdom teeth removal 2006       PSYCHIATRIC EXAMINATION   /72 (BP Location: Left arm, Patient Position: Sitting, BP Cuff Size: Adult)   Pulse 95   Ht 1.499 m (4' 11\")   SpO2 98%   BMI 28.68 kg/m²   Musculoskeletal: No abnormal movements noted  Appearance: well-developed, well-nourished, and appears stated age, cooperative, engaged, friendly, and pleasant  Thought Process:  linear and coherent  Abnormal or Psychotic Thoughts: No evidence of auditory or visual hallucinations, and no overt delusions noted  Speech: regular rate, rhythm, volume, tone, and syntax  Mood: \"ok\"  Affect: euthymic and restricted  SI/HI: Denies SI and HI  Orientation: alert and oriented  Recent and Remote Memory: no gross impairment in immediate, recent, or remote memory  Attention Span and Concentration: Appropriate for conversation  Insight/Judgement into symptoms: fair  Neurological Testing (MSSE Score and/or clock drawing): MMSE not performed during this encounter    SCREENINGS:      2/21/2023     7:20 AM 2/27/2024     3:06 PM 12/18/2024    10:30 AM   Depression Screen (PHQ-2/PHQ-9)   PHQ-2 Total Score  3    PHQ-2 Total Score 0  3   PHQ-9 Total Score  16    PHQ-9 Total Score   11         12/18/2024     3:32 PM    JENN-7 ANXIETY SCALE FLOWSHEET   Feeling nervous, anxious, or on edge 1   Not being able to stop or control worrying 2   Worrying too much about different things 1   Trouble relaxing 2   Being so restless that it is hard to sit still 3   Becoming easily annoyed or irritable 1   Feeling afraid as if something awful might happen 2   JENN-7 Total Score 12   How difficult have these problems made it for you to do your work, take care of things at " home, or get along with other people? Somewhat difficult         Never Rarely Sometimes Often Very often   1.. How often do you have trouble wrapping up the final details of a project,  once the challenging parts have been done? [] [x] [] [] []   2.How often do you have difficulty getting things in order when you have to do  a task that requires organization? [] [] [x] [] []   3.How often do you have problems remembering appointments or obligations? [] [x] [] [] []   4.When you have a task that requires a lot of thought, how often do you avoid  or delay getting started? [] [x] [] [] []   5. How often do you fidget or squirm with your hands or feet when you have  to sit down for a long time? [] [] [] [] [x]   6.How often do you feel overly active and compelled to do things, like you  were driven by a motor? [] [] [x] [] []   Part A        7.How often do you make careless mistakes when you have to work on a boring or  difficult project? [] [x] [] [] []   8.How often do you have difficulty keeping your attention when you are doing boring  or repetitive work? [] [] [x] [] []   9.How often do you have difficulty concentrating on what people say to you,  even when they are speaking to you directly? [] [] [] [x] []   10.How often do you misplace or have difficulty finding things at home or at work? [] [x] [] [] []   11.How often are you distracted by activity or noise around you? [] [] [] [x] []   12.How often do you leave your seat in meetings or other situations in which  you are expected to remain seated? [x] [] [] [] []   13.How often do you feel restless or fidgety? [] [] [] [] [x]   14.How often do you have difficulty unwinding and relaxing when you have time to yourself? [] [] [] [x] []   15. How often do you find yourself talking too much when you are in social situations? [] [] [x] [] []   16. When you’re in a conversation, how often do you find yourself finishing  the sentences of the people you are talking to,  before they can finish  them themselves? [] [] [] [x] []   17.How often do you have difficulty waiting your turn in situations when  turn taking is required? [] [x] [] [] []   18.How often do you interrupt others when they are busy? [] [] [x] [] []   Part B          ------------Last ADHD Checklists-------------------     PREVENTATIVE CARE  Medication Monitoring: Stimulants: Reviewed height, weight, blood pressure, and pulse.      [place]  records   reviewed.  No concerns about misuse of controlled substance.    CURRENT RISK ASSESSMENT       Suicide: Low       Homicide: Not applicable       Self-Harm: Not applicable       Relapse: Not applicable       Crisis Safety Plan Reviewed Not Indicated    ASSESSMENT/DIAGNOSES/PLAN  Problem List Items Addressed This Visit          Psychiatry Problems    Attention deficit hyperactivity disorder (ADHD)     Problem type: Chronic Illness with exacerbation, progression, or side effects of treatment     Assessment: Patient diagnosed with ADHD, combined type with past provider. Efficacy seen with ADDERAL XR 20mg.  Patient has been steadily taking her medications every single day with correlation to refill times and  times.  Patient states that her medication was completely off by 5 PM and she takes it around 8 AM, although this is appropriate manner time for it to last, patient most likely only has efficacy into the early afternoon(1-2 o'clock) as her productivity diminishes after this, and states she receives probably 70% efficacy.  From 20mg Dosing    No side effects, increased 30mg last visit in October. States works until 7-8pm.      Plan  Medication:   -Continue 30mg Adderall XR daily     Therapy: n/a     Other Orders: n/a          Posttraumatic stress disorder     Problem type: Chronic Illness, Stable    Assessment: Patient currently taking 200 mg sertraline bypass provider, has extensive traumatic history with current hypervigilance, mood/irritability, difficulty  "concentration, poor self worth, paranoia.  Some of the symptoms are compounded by ADHD that was not treated in a organized regiment.  Will continue to monitor, patient last filled of sertraline 200 mg in May 2024.    Plan  Medication: Sertraline 200 mg    Therapy: N/A    Other Orders: N/A         Generalized anxiety disorder         Problem type: Chronic Illness, Stable    Assessment: Adderall XR 30mg daily, is effective and lasts until \"7-8pm\" when patient takes ~10am. States no irritability, just has been inconsistent at taking medications, has been progressing more with that. Guanfacine added for sleep with adhd.    Plan  Medication:   -Adderall XR 30mg daily  -Guanfacine 1mg at bedtime    Therapy: n/a    Other Orders: n/a          Relevant Medications    amphetamine-dextroamphetamine ER (ADDERALL XR) 30 MG XR capsule (Start on 12/29/2024)    amphetamine-dextroamphetamine ER (ADDERALL XR) 30 MG XR capsule (Start on 1/29/2025)    amphetamine-dextroamphetamine ER (ADDERALL XR) 30 MG XR capsule (Start on 2/25/2025)    RESOLVED: Cluster B personality disorder in adult (HCC)       Other    Alcohol use disorder, moderate, dependence (HCC)     Problem type: Chronic Illness, Stable    Assessment:she used to drink up to 12 drinks a week (by weight of shots), she states that she feels it is a compulsion, she has cravings, she has increased her intake throughout the years, and sometimes she feels like she can control herself when she drinks.  She drinks due to it \"to dulling her emotions\" and helps her sleep.  Currently she is using CBD instead at night for sleep.  Will need to assess for anxiety after maximizing ADHD symptom-relief, patient may benefit from SSRI.  Prior trials of going through for therapy, already established.  Will continue to monitor    Currently drinks \"3-4 drinks a week\"    Plan  Medication: Not applicable    Therapy: Not applicable    Other Orders: Not applicable         Cannabis use disorder, " "moderate, dependence (HCC)     Problem type: Chronic Illness with exacerbation, progression, or side effects of treatment    Assessment: Has been using more for sleep every night, \"a quarter or half of a gummy.\" Due to recent passing of family members.     Plan  Medication: n/a    Therapy:  -Counseled for alternative sleep modalities    Other Orders: n/a                Medication options, alternatives (including no medications) and medication risks/benefits/side effects were discussed in detail.  The patient was advised to call, message clinician on [employer], or come in to the clinic if symptoms worsen or if questions/issues regarding their medications arise.  The patient verbalized understanding and agreement.    The patient was educated to call 911, call the suicide hotline, or go to the local ER if having thoughts of suicide or homicide.  The patient verbalized understanding and agreement.   The proposed treatment plan was discussed with the patient who was provided the opportunity to ask questions and make suggestions regarding alternative treatment. Patient verbalized understanding and expressed agreement with the plan.      Follow up in ~6 weeks for medication management and sleep .       This appointment was supervised by attending psychiatrist, Teresa Benz M.D., who agrees with assessment and treatment plan.  See attending attestation for more details.   "

## 2024-12-18 NOTE — ASSESSMENT & PLAN NOTE
"Problem type: Chronic Illness, Stable    Assessment:she used to drink up to 12 drinks a week (by weight of shots), she states that she feels it is a compulsion, she has cravings, she has increased her intake throughout the years, and sometimes she feels like she can control herself when she drinks.  She drinks due to it \"to dulling her emotions\" and helps her sleep.  Currently she is using CBD instead at night for sleep.  Will need to assess for anxiety after maximizing ADHD symptom-relief, patient may benefit from SSRI.  Prior trials of going through for therapy, already established.  Will continue to monitor    Currently drinks \"3-4 drinks a week\"    Plan  Medication: Not applicable    Therapy: Not applicable    Other Orders: Not applicable  "

## 2024-12-18 NOTE — ASSESSMENT & PLAN NOTE
"Problem type: Chronic Illness, Stable    Assessment: Adderall XR 30mg daily, is effective and lasts until \"7-8pm\" when patient takes ~10am. States no irritability, just has been inconsistent at taking medications, has been progressing more with that. Guanfacine added for sleep with adhd.    Plan  Medication:   -Adderall XR 30mg daily  -Guanfacine 1mg at bedtime    Therapy: n/a    Other Orders: n/a   "

## 2025-01-08 RX ORDER — SERTRALINE HYDROCHLORIDE 100 MG/1
200 TABLET, FILM COATED ORAL DAILY
Qty: 180 TABLET | Refills: 1 | Status: SHIPPED | OUTPATIENT
Start: 2025-01-08

## 2025-01-13 DIAGNOSIS — G43.719 INTRACTABLE CHRONIC MIGRAINE WITHOUT AURA AND WITHOUT STATUS MIGRAINOSUS: ICD-10-CM

## 2025-01-13 RX ORDER — RIMEGEPANT SULFATE 75 MG/75MG
75 TABLET, ORALLY DISINTEGRATING ORAL
Qty: 8 TABLET | Refills: 3 | OUTPATIENT
Start: 2025-01-13 | End: 2025-02-12

## 2025-01-23 ENCOUNTER — PHARMACY VISIT (OUTPATIENT)
Dept: PHARMACY | Facility: MEDICAL CENTER | Age: 40
End: 2025-01-23
Payer: MEDICARE

## 2025-01-29 ENCOUNTER — TELEPHONE (OUTPATIENT)
Dept: PHARMACY | Facility: MEDICAL CENTER | Age: 40
End: 2025-01-29
Payer: COMMERCIAL

## 2025-01-29 DIAGNOSIS — G43.719 INTRACTABLE CHRONIC MIGRAINE WITHOUT AURA AND WITHOUT STATUS MIGRAINOSUS: ICD-10-CM

## 2025-01-29 RX ORDER — RIMEGEPANT SULFATE 75 MG/75MG
75 TABLET, ORALLY DISINTEGRATING ORAL
Qty: 8 TABLET | Refills: 3 | OUTPATIENT
Start: 2025-01-29 | End: 2025-02-28

## 2025-01-29 NOTE — TELEPHONE ENCOUNTER
Received request via: Pharmacy    Medication Name/Dosage Ajovy 225mg/ 1.5mL AND Nurtec 75mg    When was medication last prescribed 1/8/24, 2/5/24    How many refills were previously provided 11, 3    How many Refills does he patient have left from last prescription 0    Was the patient seen in the last year in this department? No   Date of last office visit 2/21/23     Per last Neurology Office Visit, when was the date of next follow up visit set for?                            Date of office visit follow up request 6/21/23     Does the patient have an upcoming appointment? Yes   If yes, when 3/7/25             If no, schedule appointment -    Does the patient have half-way Plus and need 100 day supply (blood pressure, diabetes and cholesterol meds only)? Patient does not have SCP

## 2025-02-03 RX ORDER — FREMANEZUMAB-VFRM 225 MG/1.5ML
225 INJECTION SUBCUTANEOUS
Qty: 1.5 ML | Refills: 11 | OUTPATIENT
Start: 2025-02-03 | End: 2026-01-29

## 2025-02-03 RX ORDER — RIMEGEPANT SULFATE 75 MG/75MG
75 TABLET, ORALLY DISINTEGRATING ORAL
Qty: 8 TABLET | Refills: 3 | OUTPATIENT
Start: 2025-02-03 | End: 2025-03-05

## 2025-03-07 ENCOUNTER — APPOINTMENT (OUTPATIENT)
Dept: NEUROLOGY | Facility: MEDICAL CENTER | Age: 40
End: 2025-03-07
Attending: PSYCHIATRY & NEUROLOGY
Payer: COMMERCIAL

## 2025-03-17 DIAGNOSIS — G43.719 INTRACTABLE CHRONIC MIGRAINE WITHOUT AURA AND WITHOUT STATUS MIGRAINOSUS: Primary | ICD-10-CM

## 2025-03-17 RX ORDER — RIMEGEPANT SULFATE 75 MG/75MG
75 TABLET, ORALLY DISINTEGRATING ORAL
Qty: 8 TABLET | Refills: 11 | Status: SHIPPED | OUTPATIENT
Start: 2025-03-17 | End: 2026-03-17

## 2025-03-17 RX ORDER — FREMANEZUMAB-VFRM 225 MG/1.5ML
1.5 INJECTION SUBCUTANEOUS
Qty: 1.5 ML | Refills: 11 | Status: SHIPPED | OUTPATIENT
Start: 2025-03-17 | End: 2026-03-17

## 2025-03-18 ENCOUNTER — PHARMACY VISIT (OUTPATIENT)
Dept: PHARMACY | Facility: MEDICAL CENTER | Age: 40
End: 2025-03-18
Payer: MEDICARE

## 2025-03-18 PROCEDURE — RXMED WILLOW AMBULATORY MEDICATION CHARGE: Performed by: PSYCHIATRY & NEUROLOGY

## 2025-03-19 ENCOUNTER — OFFICE VISIT (OUTPATIENT)
Dept: BEHAVIORAL HEALTH | Facility: PSYCHIATRIC FACILITY | Age: 40
End: 2025-03-19
Payer: COMMERCIAL

## 2025-03-19 VITALS
DIASTOLIC BLOOD PRESSURE: 74 MMHG | BODY MASS INDEX: 27.09 KG/M2 | HEART RATE: 90 BPM | HEIGHT: 59 IN | SYSTOLIC BLOOD PRESSURE: 116 MMHG | WEIGHT: 134.4 LBS | OXYGEN SATURATION: 94 %

## 2025-03-19 DIAGNOSIS — F90.2 ADHD (ATTENTION DEFICIT HYPERACTIVITY DISORDER), COMBINED TYPE: ICD-10-CM

## 2025-03-19 DIAGNOSIS — F12.20 CANNABIS USE DISORDER, MODERATE, DEPENDENCE (HCC): ICD-10-CM

## 2025-03-19 DIAGNOSIS — G47.09 OTHER INSOMNIA: ICD-10-CM

## 2025-03-19 DIAGNOSIS — F10.20 ALCOHOL USE DISORDER, MODERATE, DEPENDENCE (HCC): ICD-10-CM

## 2025-03-19 DIAGNOSIS — F90.2 ATTENTION DEFICIT HYPERACTIVITY DISORDER (ADHD), COMBINED TYPE: ICD-10-CM

## 2025-03-19 DIAGNOSIS — F33.0 MILD EPISODE OF RECURRENT MAJOR DEPRESSIVE DISORDER (HCC): ICD-10-CM

## 2025-03-19 DIAGNOSIS — F41.1 GENERALIZED ANXIETY DISORDER: ICD-10-CM

## 2025-03-19 PROCEDURE — 99214 OFFICE O/P EST MOD 30 MIN: CPT | Mod: GC

## 2025-03-19 PROCEDURE — 96127 BRIEF EMOTIONAL/BEHAV ASSMT: CPT | Mod: GC

## 2025-03-19 PROCEDURE — 3078F DIAST BP <80 MM HG: CPT

## 2025-03-19 PROCEDURE — 3074F SYST BP LT 130 MM HG: CPT

## 2025-03-19 RX ORDER — DEXTROAMPHETAMINE SACCHARATE, AMPHETAMINE ASPARTATE MONOHYDRATE, DEXTROAMPHETAMINE SULFATE AND AMPHETAMINE SULFATE 7.5; 7.5; 7.5; 7.5 MG/1; MG/1; MG/1; MG/1
30 CAPSULE, EXTENDED RELEASE ORAL EVERY MORNING
Qty: 30 CAPSULE | Refills: 0 | Status: SHIPPED | OUTPATIENT
Start: 2025-05-12 | End: 2025-06-11

## 2025-03-19 RX ORDER — DEXTROAMPHETAMINE SACCHARATE, AMPHETAMINE ASPARTATE MONOHYDRATE, DEXTROAMPHETAMINE SULFATE AND AMPHETAMINE SULFATE 7.5; 7.5; 7.5; 7.5 MG/1; MG/1; MG/1; MG/1
30 CAPSULE, EXTENDED RELEASE ORAL EVERY MORNING
Qty: 30 CAPSULE | Refills: 0 | Status: SHIPPED | OUTPATIENT
Start: 2025-04-11 | End: 2025-05-11

## 2025-03-19 RX ORDER — GUANFACINE 1 MG/1
1 TABLET ORAL
Qty: 30 TABLET | Refills: 2 | Status: SHIPPED | OUTPATIENT
Start: 2025-03-19 | End: 2025-04-18

## 2025-03-19 RX ORDER — SERTRALINE HYDROCHLORIDE 100 MG/1
200 TABLET, FILM COATED ORAL DAILY
Qty: 180 TABLET | Refills: 1 | Status: SHIPPED | OUTPATIENT
Start: 2025-03-19 | End: 2025-04-18

## 2025-03-19 RX ORDER — DEXTROAMPHETAMINE SACCHARATE, AMPHETAMINE ASPARTATE MONOHYDRATE, DEXTROAMPHETAMINE SULFATE AND AMPHETAMINE SULFATE 7.5; 7.5; 7.5; 7.5 MG/1; MG/1; MG/1; MG/1
30 CAPSULE, EXTENDED RELEASE ORAL EVERY MORNING
Qty: 30 CAPSULE | Refills: 0 | Status: SHIPPED | OUTPATIENT
Start: 2025-03-19 | End: 2025-04-18

## 2025-03-19 ASSESSMENT — ANXIETY QUESTIONNAIRES
5. BEING SO RESTLESS THAT IT IS HARD TO SIT STILL: NEARLY EVERY DAY
2. NOT BEING ABLE TO STOP OR CONTROL WORRYING: NEARLY EVERY DAY
GAD7 TOTAL SCORE: 18
4. TROUBLE RELAXING: NEARLY EVERY DAY
1. FEELING NERVOUS, ANXIOUS, OR ON EDGE: NEARLY EVERY DAY
7. FEELING AFRAID AS IF SOMETHING AWFUL MIGHT HAPPEN: MORE THAN HALF THE DAYS
3. WORRYING TOO MUCH ABOUT DIFFERENT THINGS: MORE THAN HALF THE DAYS
6. BECOMING EASILY ANNOYED OR IRRITABLE: MORE THAN HALF THE DAYS

## 2025-03-19 ASSESSMENT — ENCOUNTER SYMPTOMS
HEADACHES: 1
CONSTIPATION: 1
INSOMNIA: 1
NERVOUS/ANXIOUS: 1
NAUSEA: 1
DIARRHEA: 0
ABDOMINAL PAIN: 1
TREMORS: 0
BLOOD IN STOOL: 0
DEPRESSION: 1

## 2025-03-19 ASSESSMENT — PATIENT HEALTH QUESTIONNAIRE - PHQ9
5. POOR APPETITE OR OVEREATING: 3 - NEARLY EVERY DAY
SUM OF ALL RESPONSES TO PHQ QUESTIONS 1-9: 16
CLINICAL INTERPRETATION OF PHQ2 SCORE: 3

## 2025-03-19 ASSESSMENT — FIBROSIS 4 INDEX: FIB4 SCORE: 0.71
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
## 2025-03-19 NOTE — PSYCHOTHERAPY
"Continue to promote sleep and log journaling.  \"Maybe the next step would be to stop his medication if is not helpful.\"  \"You know, it is very tough to take these medications.  When he thinks about stopping this 1 medication, because alcohol use and cannabis use can really worsen sleep too.\"  Talking to the phone to write notes can function.  "

## 2025-03-19 NOTE — ASSESSMENT & PLAN NOTE
"Problem type: Chronic Illness, Stable    Assessment: Has been using nightly for sleep every night, \"a quarter or half of a gummy.\"  States a dependence, increased tolerance, trial and want to stop usage however failed due to insomnia, insomnia only symptom of withdrawal if withdrawing from cannabis scene.     Plan  Medication: n/a    Therapy:  -Counseled for alternative sleep modalities; including sleep hygiene.  Appears patient is improving for history, with sleep efficacy by taking 15-20 minutes to fall asleep with relaxation techniques.    Other Orders: n/a   "

## 2025-03-19 NOTE — ASSESSMENT & PLAN NOTE
"Problem type: Chronic Illness, Stable    Assessment:she used to drink up to 12 drinks a week (by weight of shots), she states that she feels it is a compulsion, she has cravings, she has increased her intake throughout the years, and sometimes she feels like she can't control herself when she drinks.  She drinks due to it \"to dulling her emotions\" and helps her sleep.  Currently she is using CBD instead at night for sleep.  Hard to gauge current mood symptoms, with concurrent use of cannabis and alcohol.    Currently drinks \"3-4 drinks a week\"    Plan  Medication: Not applicable    Therapy: Not applicable    Other Orders: Not applicable  "

## 2025-03-19 NOTE — ASSESSMENT & PLAN NOTE
"Problem type: Chronic Illness, Stable    Assessment: States approximately 6.5 hours of sleep.  Stop-BANG score 1, +DAYTIME sleepiness; Not referring to sleep at this time.  Spoke about sleep hygiene.  Dependent on alcohol, cannabis.  Guanfacine initiated in January 2025, at 1 mg at night to help with sleep and ADHD.  However, due to patient's mood/substance use, not keeping a sleep journal with indeterminant thoughts of how guanfacine is helpful \"I do not know it makes me drowsy though\" hard to treat sleep.  Not increasing at this time, promoting sleep journal, sleep hygiene, mood journaling.  Consideration for cessation of guanfacine to minimize polypharmacy and indeterminate helpfulness of medication depending on next visit.    Plan  Medication:   -Continue guanfacine 1 mg at night for sleep in ADHD    Therapy:   -Sleep hygiene  -Promoting sleep journal  -Promoting mood log  -Continue meditation and relaxing techniques prior to sleep.     Other Orders: Not referring to sleep medicine at this time  "

## 2025-03-19 NOTE — ASSESSMENT & PLAN NOTE
Problem type: Chronic Illness, Stable       Assessment: Patient diagnosed with ADHD, combined type with past provider. Efficacy seen with ADDERAL XR 20mg.  Patient has been steadily taking her medications every single day with correlation to refill times and  times.  Patient states that her medication was completely off by 5 PM and she takes it around 8 AM.  Continuously has state now that he is working parameters.  Last increase from 20 mg Adderall XR to 30 mg on October 2024.  No side effects, tolerating.  With her current work schedule, does not require interim dose of immediate release.  Will continue to monitor, and reevaluate.  Ordered 3 months scripts before next visit.       Plan  Medication:   -Continue 30mg Adderall XR daily     Therapy: n/a     Other Orders: n/a

## 2025-03-27 ENCOUNTER — OFFICE VISIT (OUTPATIENT)
Dept: MEDICAL GROUP | Facility: PHYSICIAN GROUP | Age: 40
End: 2025-03-27
Payer: COMMERCIAL

## 2025-03-27 VITALS
SYSTOLIC BLOOD PRESSURE: 100 MMHG | BODY MASS INDEX: 26.98 KG/M2 | HEIGHT: 59 IN | DIASTOLIC BLOOD PRESSURE: 70 MMHG | WEIGHT: 133.82 LBS | HEART RATE: 99 BPM | RESPIRATION RATE: 14 BRPM | OXYGEN SATURATION: 96 % | TEMPERATURE: 98.2 F

## 2025-03-27 DIAGNOSIS — Z00.00 ANNUAL PHYSICAL EXAM: ICD-10-CM

## 2025-03-27 DIAGNOSIS — Z23 NEED FOR VACCINATION: ICD-10-CM

## 2025-03-27 DIAGNOSIS — G43.719 INTRACTABLE CHRONIC MIGRAINE WITHOUT AURA AND WITHOUT STATUS MIGRAINOSUS: ICD-10-CM

## 2025-03-27 DIAGNOSIS — G25.81 RESTLESS LEG SYNDROME: ICD-10-CM

## 2025-03-27 DIAGNOSIS — F90.2 ATTENTION DEFICIT HYPERACTIVITY DISORDER (ADHD), COMBINED TYPE: ICD-10-CM

## 2025-03-27 DIAGNOSIS — G43.119 INTRACTABLE MIGRAINE WITH AURA WITHOUT STATUS MIGRAINOSUS: ICD-10-CM

## 2025-03-27 DIAGNOSIS — F43.10 POSTTRAUMATIC STRESS DISORDER: ICD-10-CM

## 2025-03-27 DIAGNOSIS — F33.0 MILD EPISODE OF RECURRENT MAJOR DEPRESSIVE DISORDER (HCC): ICD-10-CM

## 2025-03-27 DIAGNOSIS — F41.1 GENERALIZED ANXIETY DISORDER: ICD-10-CM

## 2025-03-27 PROCEDURE — 3078F DIAST BP <80 MM HG: CPT | Performed by: STUDENT IN AN ORGANIZED HEALTH CARE EDUCATION/TRAINING PROGRAM

## 2025-03-27 PROCEDURE — 90677 PCV20 VACCINE IM: CPT | Performed by: STUDENT IN AN ORGANIZED HEALTH CARE EDUCATION/TRAINING PROGRAM

## 2025-03-27 PROCEDURE — 99395 PREV VISIT EST AGE 18-39: CPT | Mod: 25 | Performed by: STUDENT IN AN ORGANIZED HEALTH CARE EDUCATION/TRAINING PROGRAM

## 2025-03-27 PROCEDURE — 90471 IMMUNIZATION ADMIN: CPT | Performed by: STUDENT IN AN ORGANIZED HEALTH CARE EDUCATION/TRAINING PROGRAM

## 2025-03-27 PROCEDURE — 3074F SYST BP LT 130 MM HG: CPT | Performed by: STUDENT IN AN ORGANIZED HEALTH CARE EDUCATION/TRAINING PROGRAM

## 2025-03-27 RX ORDER — RIMEGEPANT SULFATE 75 MG/75MG
75 TABLET, ORALLY DISINTEGRATING ORAL
Qty: 8 TABLET | Refills: 11 | Status: CANCELLED | OUTPATIENT
Start: 2025-03-27 | End: 2026-03-27

## 2025-03-27 RX ORDER — FREMANEZUMAB-VFRM 225 MG/1.5ML
1.5 INJECTION SUBCUTANEOUS
Qty: 1.5 ML | Refills: 11 | Status: CANCELLED | OUTPATIENT
Start: 2025-03-27 | End: 2026-03-27

## 2025-03-27 ASSESSMENT — FIBROSIS 4 INDEX: FIB4 SCORE: 0.71

## 2025-03-27 NOTE — PROGRESS NOTES
Subjective:   HISTORY OF THE PRESENT ILLNESS: Patient is a 39 y.o. female here today to establish care. His/her prior PCP was Alexei Clinton      History of Present Illness  39-year-old female presents to establish care.    Diagnosed with ADHD, previously under psychiatric care at [facility], seeking new psychiatrist due to insurance issues. On Adderall 30 mg AM.    History of PTSD, anxiety, and depression, previously in therapy, now discontinued.    Asthma stable, discontinued allergy injections due to financial constraints, resulting in intermittent sinus congestion and coughing. On albuterol inhaler, ProAir, and plans to resume daily steroid inhaler.    Migraines with visual auras, less frequent on medication. Previously under neurologist Dr. Dorian Tadeo, advised to consult PCP for Nurtec and Ajovy prescriptions. On Ajovy injection monthly and Nurtec PRN.    Consumes alcohol almost daily, 3-4 times/week, uses CBD for severe muscle spasms at night affecting legs and shoulders, disrupting sleep. Spasms not believed to be Adderall side effect.     , employed as . One pregnancy, vaginal delivery. Overdue for Pap smear, does not regularly see gynecologist, not on birth control, not attempting to conceive, no history of abnormal Pap smears. Does not regularly see dentist. Declines influenza vaccine.    SOCIAL HISTORY  - Drinks alcohol almost daily, 3-4 times/week  - Does not smoke  - Tried vaping without nicotine briefly  - Uses CBD for muscle spasms    FAMILY HISTORY  - Adopted, no information on biological family's medical history    MEDICATIONS  Adderall, albuterol inhaler, ProAir, Ajovy, Nurtec, sertraline    .    Problem   Intractable Migraine With Aura Without Status Migrainosus          Review of systems:  Per HPI    Patient Active Problem List    Diagnosis Date Noted    Intractable migraine with aura without status migrainosus 03/27/2025    Other insomnia 03/19/2025    Cannabis use disorder, moderate,  dependence (HCC) 12/18/2024    Adjustment disorder with disturbance of emotion 10/23/2024    Alcohol use disorder, moderate, dependence ([facility]) 10/23/2024    Interpersonal problem 07/05/2024    Peripheral vertigo of both ears 08/11/2022    Mild intermittent asthma, uncomplicated 08/11/2022    Mild episode of recurrent major depressive disorder (HCC) 08/01/2022    STD (female) 06/06/2022    Attention deficit hyperactivity disorder (ADHD) 01/11/2021    Posttraumatic stress disorder 01/11/2021    Generalized anxiety disorder 01/11/2021    Asthma 12/05/2019     Past Surgical History:   Procedure Laterality Date    NE LAP,DIAGNOSTIC ABDOMEN  12/5/2019    Procedure: PELVISCOPY;  Surgeon: Celso Burnett M.D.;  Location: SURGERY [facility];  Service: Gynecology    NE REMOVE INTRAUTERINE DEVICE  12/5/2019    Procedure: REMOVAL, INTRAUTERINE DEVICE;  Surgeon: Celso Burnett M.D.;  Location: SURGERY [facility];  Service: Gynecology    SALPINGECTOMY Bilateral 12/5/2019    Procedure: SALPINGECTOMY;  Surgeon: Celso Burnett M.D.;  Location: SURGERY [facility];  Service: Gynecology    GYN SURGERY  2010, 2012    laparoscopy    OTHER      tonsillectomy    OTHER      wisdom teeth removal 2006     Social History     Tobacco Use    Smoking status: Never    Smokeless tobacco: Never   Vaping Use    Vaping status: Never Used   Substance Use Topics    Alcohol use: Yes     Alcohol/week: 0.6 oz     Types: 1 Shots of liquor per week    Drug use: Not Currently     Types: Marijuana     Comment: marijuana 1x per week      No family history on file.  Current Outpatient Medications   Medication Sig Dispense Refill    amphetamine-dextroamphetamine ER (ADDERALL XR) 30 MG XR capsule Take 1 Capsule by mouth every morning for 30 days. 30 Capsule 0    guanFACINE (TENEX) 1 MG Tab Take 1 Tablet by mouth at bedtime for 30 days. 30 Tablet 2    sertraline (ZOLOFT) 100 MG Tab Take 2 Tablets by mouth every day for 30 days. 180 Tablet 1  "   Fremanezumab-vfrm (AJOVY) 225 MG/1.5ML Solution Auto-injector Inject 1.5 mL under the skin every 30 (thirty) days. 1.5 mL 11    Rimegepant Sulfate (NURTEC) 75 MG TABLET DISPERSIBLE Take 1 Tablet by mouth 1 time a day as needed (migraine). 8 Tablet 11    albuterol 108 (90 Base) MCG/ACT Aero Soln inhalation aerosol INHALE 2 (TWO) PUFFS INTO THE LUNGS EVERY 4-6 HOURS AS NEEDED      fluticasone (FLONASE) 50 MCG/ACT nasal spray Spray 1 Spray in nose every day.      fexofenadine (ALLEGRA) 180 MG tablet Take 180 mg by mouth Once.      [START ON 4/11/2025] amphetamine-dextroamphetamine ER (ADDERALL XR) 30 MG XR capsule Take 1 Capsule by mouth every morning for 30 days. (Patient not taking: Reported on 3/27/2025) 30 Capsule 0    [START ON 5/12/2025] amphetamine-dextroamphetamine ER (ADDERALL XR) 30 MG XR capsule Take 1 Capsule by mouth every morning for 30 days. (Patient not taking: Reported on 3/27/2025) 30 Capsule 0     No current facility-administered medications for this visit.       Allergies:  Allergies   Allergen Reactions    Hydrocodone Unspecified     Grinding teeth and eyes rolled back.       Allergies, past medical history, past surgical history, family history, social history reviewed and updated.    Objective:    /70   Pulse 99   Temp 36.8 °C (98.2 °F) (Temporal)   Resp 14   Ht 1.499 m (4' 11\")   Wt 60.7 kg (133 lb 13.1 oz)   SpO2 96%   BMI 27.03 kg/m²    Body mass index is 27.03 kg/m².    Physical exam:  General: Normal appearance, no acute distress, not ill-appearing  HEENT: EOM intact, conjunctiva normal limits, negative right/left eye discharge.  Sclera anicteric, tm wnl bilat  Cardiovascular: Normal rate and rhythm, no murmurs  Pulmonary: No respiratory distress, no wheezing, no rales, breath sounds normal.  Abdomen: Bowel sounds normal, flat, soft.  Musculoskeletal: No edema bilaterally  Skin: Warm, dry, no lesions  Neurological: No focal deficits, normal gait  Psychiatric: Mood within " normal limits    Assessment/Plan:    Patient here for a preventive medicine visit today and to establish care.  -Reviewed all past medical history, family history, social history    -Diet and exercise appropriate counseling given  -Social determinants of health reviewed  -Tobacco, alcohol, recreational drug use: discussed alcohol use   -Occupation:   -Cholesterol screening: ordered  -Diabetes screening: ordered    Immunizations/Infectious disease:  STI screening:dec  Safe sex practices discusssed  HIV screening: dec  Immunizations: pna today, declines influenza    Cancer screenings:  Colorectal cancer screening: adopted   Cervical Cancer Screening: years ago scheduled   Breast Cancer Screening:          ----BRCA SCREENING: FHS-7 score: adopted       Ob-Gyn/ History:   /Para:   Hx of abnormal Pap smears: none  Gyn Surgery: btl  Current Contraceptive Method: none  Last menstrual period:  Periods regular    Assessment & Plan  1. ADHD: Stable. On Adderall 30 mg AM.  - Referral to psychiatry for further management    2. PTSD: Stable.  - Referral to therapy    3. Anxiety: Stable.  - Referral to therapy    4. Depression: Stable.  - Referral to therapy    5. Asthma: Stable.  - Resume daily steroid inhaler  - Currently using albuterol inhaler  and a daily steroid inhaler    6. Migraine: Stable. On Ajovy monthly and Nurtec PRN.  - Referral to neurologist for further management and refills of this injection    7. Alcohol use disorder: Stable.  - Advised to reduce alcohol consumption  - Therapy may help    8. Muscle spasms: Stable.  - Order lab tests to investigate cause  - Advised to try magnesium at night    9. Health maintenance: Stable.  - Advised dental check-ups twice a year  - Pap smear during visit  - Pneumonia vaccine today    Follow-up in 1 month.    PROCEDURE  Tubal ligation and abdominal surgery in the past.        Problem List Items Addressed This Visit       Attention deficit hyperactivity  disorder (ADHD)    Relevant Orders    Referral to Behavioral Health    Referral to Behavioral Health    Posttraumatic stress disorder    Relevant Orders    Referral to Behavioral Health    Generalized anxiety disorder    Relevant Orders    Referral to Behavioral Health    Mild episode of recurrent major depressive disorder (HCC)    Relevant Orders    Referral to Behavioral Health    Intractable migraine with aura without status migrainosus    Relevant Orders    Referral to Neurology     Other Visit Diagnoses         Intractable chronic migraine without aura and without status migrainosus          Need for vaccination        Relevant Orders    Pneumococcal Conjugate Vaccine 20-Valent (6 wks+)      Annual physical exam        Relevant Orders    CBC WITHOUT DIFFERENTIAL    Comp Metabolic Panel    VITAMIN D,25 HYDROXY (DEFICIENCY)      Restless leg syndrome        Relevant Orders    VITAMIN B12    TSH WITH REFLEX TO FT4             Patient Counseling:  --Discussed moderation in caloric intake, sufficient fresh fruits/vegetables, fiber, iron  --Discussed brushing, flossing, and dental visits.   --Encouraged regular exercise.   --Discussed tobacco, alcohol, or other drug use.  --Discussed sexually transmitted infections, partner selection, use of condoms, avoidance of unintended pregnancy and contraceptive alternatives.  --Injury prevention: Discussed     No follow-ups on file.

## 2025-03-31 NOTE — Clinical Note
REFERRAL APPROVAL NOTICE         Sent on March 31, 2025                   Tesha Mason  [address]  [place] [address]                   Dear Ms. Mason,    After a careful review of the medical information and benefit coverage, [facility] has processed your referral. See below for additional details.    If applicable, you must be actively enrolled with your insurance for coverage of the authorized service. If you have any questions regarding your coverage, please contact your insurance directly.    REFERRAL INFORMATION   Referral #:  [id number]  Referred-To Provider    Referred-By Provider:  Behavioral Health    [facility]      [address]  [place] [address]  [phone] [address]  [facility] [address]  [phone]    Referral Start Date:  03/27/2025  Referral End Date:   03/27/2026             SCHEDULING  If you do not already have an appointment, please call [phone] to make an appointment.     MORE INFORMATION  If you do not already have a [employer] account, sign up at: [email or web address]  You can access your medical information, make appointments, see lab results, billing information, and more.  If you have questions regarding this referral, please contact  the [facility] Referrals department at:             [phone]. Monday - Friday 8:00AM - 5:00PM.     Sincerely,    [facility]

## 2025-03-31 NOTE — Clinical Note
REFERRAL APPROVAL NOTICE         Sent on March 31, 2025                   Tesha Mason  [address]  [place] [address]                   Dear Ms. Mason,    After a careful review of the medical information and benefit coverage, [facility] has processed your referral. See below for additional details.    If applicable, you must be actively enrolled with your insurance for coverage of the authorized service. If you have any questions regarding your coverage, please contact your insurance directly.    REFERRAL INFORMATION   Referral #:  [id number]  Referred-To Provider    Referred-By Provider:  Behavioral Health    [facility]      [address]  [address]  [place] [address]  [phone] [address]  [facility] [address]  [phone]    Referral Start Date:  03/27/2025  Referral End Date:   03/27/2026             SCHEDULING  If you do not already have an appointment, please call [phone] to make an appointment.     MORE INFORMATION  If you do not already have a [employer] account, sign up at: [email or web address]  You can access your medical information, make appointments, see lab results, billing information, and more.  If you have questions regarding this referral, please contact  the [facility] Referrals department at:             [phone]. Monday - Friday 8:00AM - 5:00PM.     Sincerely,    [facility]

## 2025-03-31 NOTE — Clinical Note
REFERRAL APPROVAL NOTICE         Sent on March 31, 2025                   Tesha Mason  [address]  [place] [address]                   Dear Ms. Mason,    After a careful review of the medical information and benefit coverage, [facility] has processed your referral. See below for additional details.    If applicable, you must be actively enrolled with your insurance for coverage of the authorized service. If you have any questions regarding your coverage, please contact your insurance directly.    REFERRAL INFORMATION   Referral #:  [id number]  Referred-To Department    Referred-By Provider:  Neurology    Aurosis REINALDO Winter   Neurology [facility]      [address] [facility] [address]  [place] [address]  [phone] [address]  [facility] [address] [phone]    Referral Start Date:  03/27/2025  Referral End Date:   03/27/2026           SCHEDULING  If you do not already have an appointment, please call [phone] to make an appointment.   MORE INFORMATION  As a reminder, [facility] ownership has changed, meaning this location is now owned and operated by [facility]. As such, we want to clarify that our patients should expect to receive two separate bills for the services received at [facility] - one representing the [facility] facility fees as the owner of the establishment, and the other to represent the physician's services and subsequent fees. You can speak with your insurance carrier for a pricing estimate by calling the customer service number on the back of your card and ask about charges for a hospital outpatient visit.  If you do not already have a [employer] account, sign up at: [email or web address]  You can access your medical information, make appointments, see lab results, billing information, and more.  If you have questions regarding this referral, please contact  the [facility] Referrals department at:             [phone].  Monday - Friday 7:30AM - 5:00PM.      Sincerely,  [facility]

## 2025-04-08 ENCOUNTER — TELEPHONE (OUTPATIENT)
Dept: HEALTH INFORMATION MANAGEMENT | Facility: OTHER | Age: 40
End: 2025-04-08
Payer: COMMERCIAL

## 2025-04-09 ENCOUNTER — OFFICE VISIT (OUTPATIENT)
Dept: BEHAVIORAL HEALTH | Facility: PSYCHIATRIC FACILITY | Age: 40
End: 2025-04-09
Payer: COMMERCIAL

## 2025-04-09 VITALS
HEIGHT: 59 IN | DIASTOLIC BLOOD PRESSURE: 64 MMHG | OXYGEN SATURATION: 94 % | WEIGHT: 132.8 LBS | SYSTOLIC BLOOD PRESSURE: 106 MMHG | BODY MASS INDEX: 26.77 KG/M2 | HEART RATE: 81 BPM

## 2025-04-09 DIAGNOSIS — F43.29 ADJUSTMENT DISORDER WITH DISTURBANCE OF EMOTION: ICD-10-CM

## 2025-04-09 DIAGNOSIS — F43.10 POSTTRAUMATIC STRESS DISORDER: ICD-10-CM

## 2025-04-09 DIAGNOSIS — F90.2 ATTENTION DEFICIT HYPERACTIVITY DISORDER (ADHD), COMBINED TYPE: ICD-10-CM

## 2025-04-09 DIAGNOSIS — Z65.8 INTERPERSONAL PROBLEM: ICD-10-CM

## 2025-04-09 DIAGNOSIS — F41.1 GENERALIZED ANXIETY DISORDER: ICD-10-CM

## 2025-04-09 PROCEDURE — 99999 PR NO CHARGE: CPT | Mod: 25

## 2025-04-09 ASSESSMENT — LIFESTYLE VARIABLES: SUBSTANCE_ABUSE: 0

## 2025-04-09 ASSESSMENT — ANXIETY QUESTIONNAIRES
3. WORRYING TOO MUCH ABOUT DIFFERENT THINGS: NEARLY EVERY DAY
1. FEELING NERVOUS, ANXIOUS, OR ON EDGE: NEARLY EVERY DAY
2. NOT BEING ABLE TO STOP OR CONTROL WORRYING: NEARLY EVERY DAY
GAD7 TOTAL SCORE: 21
7. FEELING AFRAID AS IF SOMETHING AWFUL MIGHT HAPPEN: NEARLY EVERY DAY
5. BEING SO RESTLESS THAT IT IS HARD TO SIT STILL: NEARLY EVERY DAY
4. TROUBLE RELAXING: NEARLY EVERY DAY
6. BECOMING EASILY ANNOYED OR IRRITABLE: NEARLY EVERY DAY
IF YOU CHECKED OFF ANY PROBLEMS ON THIS QUESTIONNAIRE, HOW DIFFICULT HAVE THESE PROBLEMS MADE IT FOR YOU TO DO YOUR WORK, TAKE CARE OF THINGS AT HOME, OR GET ALONG WITH OTHER PEOPLE: VERY DIFFICULT

## 2025-04-09 ASSESSMENT — PATIENT HEALTH QUESTIONNAIRE - PHQ9
SUM OF ALL RESPONSES TO PHQ QUESTIONS 1-9: 20
CLINICAL INTERPRETATION OF PHQ2 SCORE: 6
5. POOR APPETITE OR OVEREATING: 3 - NEARLY EVERY DAY

## 2025-04-09 ASSESSMENT — ENCOUNTER SYMPTOMS
INSOMNIA: 1
HALLUCINATIONS: 0
DEPRESSION: 1
NERVOUS/ANXIOUS: 1

## 2025-04-09 ASSESSMENT — FIBROSIS 4 INDEX: FIB4 SCORE: 0.71

## 2025-04-09 NOTE — ASSESSMENT & PLAN NOTE
"Problem type: Chronic Illness, Stable    Assessment: See under \"adjustment disorder\"    Plan  Medication: n/a    Therapy: n/a    Other Orders: n/a   "

## 2025-04-09 NOTE — PROGRESS NOTES
"[facility] Outpatient Psychiatric Follow Up Note  Evaluation completed by: Kacey Sawant D.O.   Date of Service: 04/09/2025  Appointment type: in-office appointment.  Attending:  Teresa Benz M.D.  Information below was collected from: patient    CHIEF COMPLIANT:  Follow-Up (\"I'm feeling really down, I can't re-house the animals, I don't know what to do\")        HPI:   Tesha Mason is a 39 y.o. old female who presents today for regularly scheduled follow up for assessment of Follow-Up (\"I'm feeling really down, I can't re-house the animals, I don't know what to do\")    Patient seen in room very tearful originally, states \"he wants to get a divorce.\"  Patient relates increased psychosocial stressors including interpersonal difficulties which has prompted her to revisit early for psychiatric appointment.  She states her sleep has been \"really bad maybe 5 or 6 hours at night, but I am not drinking and taking all of my medications\" by the reassurance of her mother.  She denies any intolerability to the medications, and we spent the majority of the session going over coping strategies and possible ways to help her cope through this new stressor.  Patient was always able to come up with several things such as calling family members, calling friends, listening to music, going out for walks, limiting stressor interactions, going to work.  Patient has had an appointment with PCP which had referral to individual psychotherapy, I gave patient phone number of fax referral for patient to call and monitor.  Patient appeared grateful.    We spoke about how change in situational setting with poor sleep rarely able to be treated with medication.  However, we could increase her guanfacine today.  Patient states \"I think that is okay I do not want to do anything to change the meds.\"      PSYCHIATRIC REVIEW OF SYSTEMS:current symptoms as reported by pt.  Depression: Depressed mood, Difficulty sleeping, Sense " of hopelessness, Low energy, Higher than normal appetite, and Difficulty concentrating all in the setting of new psychosocial stressors.   Anxiety/Panic Attacks: insomnia, racing thoughts, feelings of losing control, difficulty concentrating  Psychosis: Patient reports no signs or symptoms indicative of psychosis  ADHD: fails to give close attention to details or makes careless mistakes in school, has difficulty sustaining attention in tasks or play activities, has difficulty organizing tasks and activities, is easily distracted by extraneous stimuli, fidgets with hands or feet or squirms in seat, talks excessively      CURRENT MEDICATIONS    Current Outpatient Medications:     amphetamine-dextroamphetamine ER (ADDERALL XR) 30 MG XR capsule, Take 1 Capsule by mouth every morning for 30 days., Disp: 30 Capsule, Rfl: 0    [START ON 4/11/2025] amphetamine-dextroamphetamine ER (ADDERALL XR) 30 MG XR capsule, Take 1 Capsule by mouth every morning for 30 days. (Patient not taking: Reported on 3/27/2025), Disp: 30 Capsule, Rfl: 0    [START ON 5/12/2025] amphetamine-dextroamphetamine ER (ADDERALL XR) 30 MG XR capsule, Take 1 Capsule by mouth every morning for 30 days. (Patient not taking: Reported on 3/27/2025), Disp: 30 Capsule, Rfl: 0    guanFACINE (TENEX) 1 MG Tab, Take 1 Tablet by mouth at bedtime for 30 days., Disp: 30 Tablet, Rfl: 2    sertraline (ZOLOFT) 100 MG Tab, Take 2 Tablets by mouth every day for 30 days., Disp: 180 Tablet, Rfl: 1    Fremanezumab-vfrm (AJOVY) 225 MG/1.5ML Solution Auto-injector, Inject 1.5 mL under the skin every 30 (thirty) days., Disp: 1.5 mL, Rfl: 11    Rimegepant Sulfate (NURTEC) 75 MG TABLET DISPERSIBLE, Take 1 Tablet by mouth 1 time a day as needed (migraine)., Disp: 8 Tablet, Rfl: 11    albuterol 108 (90 Base) MCG/ACT Aero Soln inhalation aerosol, INHALE 2 (TWO) PUFFS INTO THE LUNGS EVERY 4-6 HOURS AS NEEDED, Disp: , Rfl:     fluticasone (FLONASE) 50 MCG/ACT nasal spray, Spray 1 Spray  in nose every day., Disp: , Rfl:     fexofenadine (ALLEGRA) 180 MG tablet, Take 180 mg by mouth Once., Disp: , Rfl:      REVIEW OF SYSTEMS   Review of Systems   Psychiatric/Behavioral:  Positive for depression. Negative for hallucinations, substance abuse and suicidal ideas. The patient is nervous/anxious and has insomnia.      Neurologic: no tics, tremors, dyskinesias. The patient denies dizziness, syncope, falls. Ambulates independently    PAST MEDICAL HISTORY  Past Medical History:   Diagnosis Date    Asthma     prn inhaler    Bronchitis     2009    Head ache      Allergies   Allergen Reactions    Hydrocodone Unspecified     Grinding teeth and eyes rolled back.     Past Surgical History:   Procedure Laterality Date    NH LAP,DIAGNOSTIC ABDOMEN  12/5/2019    Procedure: PELVISCOPY;  Surgeon: Celso Burnett M.D.;  Location: SURGERY [facility];  Service: Gynecology    NH REMOVE INTRAUTERINE DEVICE  12/5/2019    Procedure: REMOVAL, INTRAUTERINE DEVICE;  Surgeon: Celso Burnett M.D.;  Location: SURGERY [facility];  Service: Gynecology    SALPINGECTOMY Bilateral 12/5/2019    Procedure: SALPINGECTOMY;  Surgeon: Celso Burnett M.D.;  Location: SURGERY [facility];  Service: Gynecology    GYN SURGERY  2010, 2012    laparoscopy    OTHER      tonsillectomy    OTHER      wisdom teeth removal 2006      No family history on file.  Social History     Socioeconomic History    Marital status:    Tobacco Use    Smoking status: Never    Smokeless tobacco: Never   Vaping Use    Vaping status: Never Used   Substance and Sexual Activity    Alcohol use: Yes     Alcohol/week: 0.6 oz     Types: 1 Shots of liquor per week    Drug use: Not Currently     Types: Marijuana     Comment: marijuana 1x per week    Sexual activity: Yes     Partners: Male     Past Surgical History:   Procedure Laterality Date    NH LAP,DIAGNOSTIC ABDOMEN  12/5/2019    Procedure: PELVISCOPY;  Surgeon: Celso Burnett M.D.;   "Location: SURGERY [facility];  Service: Gynecology    CA REMOVE INTRAUTERINE DEVICE  12/5/2019    Procedure: REMOVAL, INTRAUTERINE DEVICE;  Surgeon: Celso Burnett M.D.;  Location: SURGERY [facility];  Service: Gynecology    SALPINGECTOMY Bilateral 12/5/2019    Procedure: SALPINGECTOMY;  Surgeon: Celso Burnett M.D.;  Location: SURGERY [facility];  Service: Gynecology    GYN SURGERY  2010, 2012    laparoscopy    OTHER      tonsillectomy    OTHER      wisdom teeth removal 2006       PSYCHIATRIC EXAMINATION   /64 (BP Location: Left arm, Patient Position: Sitting, BP Cuff Size: Adult)   Pulse 81   Ht 1.499 m (4' 11\")   Wt 60.2 kg (132 lb 12.8 oz)   SpO2 94%   BMI 26.82 kg/m²   Musculoskeletal: No abnormal movements noted  Appearance: well-developed, well-nourished, appears stated age, fair hygiene, and appropriately dressed, engaged, pleasant, disorganized, and good eye contact  Thought Process:  linear, coherent, not goal-oriented, and disorganized  Abnormal or Psychotic Thoughts: No evidence of auditory or visual hallucinations, and no overt delusions noted  Speech: regular rate, rhythm, volume, tone, and syntax and coherent  Mood: \"depressed\"  Affect:  sad and tearful, full range of affect, non-labile, mood congruent  SI/HI: Denies SI and HI  Orientation: alert and oriented  Recent and Remote Memory: no gross impairment in immediate, recent, or remote memory  Attention Span and Concentration:  Insight/Judgement into symptoms:  limited due to current distress  Neurological Testing (MSSE Score and/or clock drawing): MMSE not performed during this encounter    SCREENINGS:      12/18/2024    10:30 AM 3/19/2025     9:45 AM 4/9/2025    11:15 AM   Depression Screen (PHQ-2/PHQ-9)   PHQ-2 Total Score 3 3 6   PHQ-9 Total Score 11 16 20         12/18/2024     3:32 PM 3/19/2025    12:07 PM 4/9/2025    12:11 PM   JENN 7   JENN-7 Total Score 12 18 21       Interpretation of JENN 7 Total Score "   Score Severity:  0-4 No Anxiety   5-9 Mild Anxiety  10-14 Moderate Anxiety  15-21 Severe Anxiety    Interpretation of PHQ-9 Total Score   Score Severity   1-4 No Depression   5-9 Mild Depression   10-14 Moderate Depression   15-19 Moderately Severe Depression   20-27 Severe Depression     [place]  records   reviewed.  No concerns about misuse of controlled substance.    CURRENT RISK ASSESSMENT       Suicide: Not applicable       Homicide: Not applicable       Self-Harm: Not applicable       Relapse: Low       Crisis Safety Plan Reviewed Not Indicated    ASSESSMENT/DIAGNOSES/PLAN  Problem List Items Addressed This Visit          Psychiatry Problems    Attention deficit hyperactivity disorder (ADHD)    Problem type: Chronic Illness, Stable       Assessment: Patient diagnosed with ADHD, combined type with past provider. Efficacy seen with ADDERAL XR 20mg.  Patient has been steadily taking her medications every single day with correlation to refill times and  times.  Patient states that her medication was completely off by 5 PM and she takes it around 8 AM.  Continuously has state now that he is working parameters.  Last increase from 20 mg Adderall XR to 30 mg on October 2024.  No side effects, tolerating.  With her current work schedule, does not require interim dose of immediate release.  Will continue to monitor, and reevaluate.  Ordered 3 months scripts before next visit.       Plan  Medication:   -Continue 30mg Adderall XR daily     Therapy: n/a     Other Orders: n/a          Posttraumatic stress disorder    Problem type: Chronic Illness, Stable    Assessment: Patient currently taking 200 mg sertraline bypass provider, has extensive traumatic history with current hypervigilance, mood/irritability, difficulty concentration, poor self worth, paranoia.  Some of the symptoms are compounded by ADHD that was not treated in a organized regiment.  Will continue to monitor, patient last filled of sertraline 200  "mg in May 2024.    Plan  Medication: Sertraline 200 mg    Therapy: N/A    Other Orders: N/A         Generalized anxiety disorder    Problem type: Chronic Illness, Stable    Assessment: exacerbated due to current interpersonal difficulties. Co-morbid with PTSD, see under PTSD. And co-morbid with Attention Deficit Hyperactivity Disorder.     Plan  Medication: sertraline 200mg daily    Therapy: referral sent by PCP    Other Orders: n/a           Adjustment disorder with disturbance of emotion    Problem type: New Problem    Assessment: new psychosocial stressors which have worsened interpersonal relationships. Patient's mood and insomnia exacerbated by new stressors. Has social support and comfort in family and friends, states does not want changes to medications. Therapy referral sent by PCP on 03/27. Phone number given for follow up    Plan  Medication:   -n/a    Therapy: referral from PCP 03/27    Other Orders: n/a             Other    Interpersonal problem    Problem type: Chronic Illness, Stable    Assessment: See under \"adjustment disorder\"    Plan  Medication: n/a    Therapy: n/a    Other Orders: n/a                Medication options, alternatives (including no medications) and medication risks/benefits/side effects were discussed in detail.  The patient was advised to call, message clinician on [employer], or come in to the clinic if symptoms worsen or if questions/issues regarding their medications arise.  The patient verbalized understanding and agreement.    The patient was educated to call 911, call the suicide hotline, or go to the local ER if having thoughts of suicide or homicide.  The patient verbalized understanding and agreement.   The proposed treatment plan was discussed with the patient who was provided the opportunity to ask questions and make suggestions regarding alternative treatment. Patient verbalized understanding and expressed agreement with the plan.      See ~12 weeks for medication " evaluation, no changes today. Stressors and changed PHQ9 and GAD9 due to psychosocial stressors not to be fixed by medications, patient prefers no changes to medications. Referral sent to individual psychotherapy by PCP, gave patient phone number to f/u.       This appointment was supervised by attending psychiatrist, Teresa Benz M.D., who agrees with assessment and treatment plan.  See attending attestation for more details.

## 2025-04-09 NOTE — ASSESSMENT & PLAN NOTE
Problem type: New Problem    Assessment: new psychosocial stressors which have worsened interpersonal relationships. Patient's mood and insomnia exacerbated by new stressors. Has social support and comfort in family and friends, states does not want changes to medications. Therapy referral sent by PCP on 03/27. Phone number given for follow up    Plan  Medication:   -n/a    Therapy: referral from PCP 03/27    Other Orders: n/a

## 2025-04-09 NOTE — ASSESSMENT & PLAN NOTE
Problem type: Chronic Illness, Stable    Assessment: exacerbated due to current interpersonal difficulties. Co-morbid with PTSD, see under PTSD. And co-morbid with Attention Deficit Hyperactivity Disorder.     Plan  Medication: sertraline 200mg daily    Therapy: referral sent by PCP    Other Orders: n/a

## 2025-04-14 PROCEDURE — RXMED WILLOW AMBULATORY MEDICATION CHARGE: Performed by: PSYCHIATRY & NEUROLOGY

## 2025-04-16 ENCOUNTER — PHARMACY VISIT (OUTPATIENT)
Dept: PHARMACY | Facility: MEDICAL CENTER | Age: 40
End: 2025-04-16
Payer: MEDICARE

## 2025-04-29 ENCOUNTER — APPOINTMENT (OUTPATIENT)
Dept: MEDICAL GROUP | Facility: PHYSICIAN GROUP | Age: 40
End: 2025-04-29
Payer: COMMERCIAL

## 2025-05-13 PROCEDURE — RXMED WILLOW AMBULATORY MEDICATION CHARGE: Performed by: PSYCHIATRY & NEUROLOGY

## 2025-05-14 ENCOUNTER — PHARMACY VISIT (OUTPATIENT)
Dept: PHARMACY | Facility: MEDICAL CENTER | Age: 40
End: 2025-05-14
Payer: MEDICARE
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
## 2025-06-11 ENCOUNTER — TELEPHONE (OUTPATIENT)
Dept: NEUROLOGY | Facility: MEDICAL CENTER | Age: 40
End: 2025-06-11
Payer: COMMERCIAL

## 2025-06-11 PROCEDURE — RXMED WILLOW AMBULATORY MEDICATION CHARGE: Performed by: PSYCHIATRY & NEUROLOGY

## 2025-06-11 NOTE — TELEPHONE ENCOUNTER
Prior Authorization for     AJOVY injection 225MG/1.5ML auto-injectors    (Quantity: 1.5, Days: 28) has been submitted via [id number]    Insurance: [employer]  ID [id number]  [facility] [id number]  PCN ADV  [id number]    Will follow up in 24-48 business hours.

## 2025-06-11 NOTE — TELEPHONE ENCOUNTER
Prior Authorization for NURTEC 75MG has been APPROVED for a quantity of 8 , day supply 30    Prior Authorization reference number: [id number]    Insurance: [employer]  ID [id number]  [facility] [id number]  PCN ADV  [id number]    Effective dates: 6/11/2025-6/11/2026    Copay: $0     Is patient eligible to fill with [facility]? Yes    Next Steps: The prescription has been approved.   I will notify the patient and offer our delivery service from   [facility]    Meryl COY  Rx Coodinator

## 2025-06-11 NOTE — TELEPHONE ENCOUNTER
Prior Authorization for     NURTEC 75MG   (Quantity: 8Days: 30) has been submitted via  [id number]    Insurance: [employer]  ID [id number]  [place] [id number]  PCN ADV  GR [id number]     Will follow up in 24-48 business hours.

## 2025-06-11 NOTE — TELEPHONE ENCOUNTER
Prior Authorization for AJOVY 225MG has been APPROVED for a quantity of 1.5 , day supply 28    Prior Authorization reference number: [id number]    Insurance:  [employer]  ID [id number]  [facility] [id number]  PCN ADV  [id number]    Effective dates: 6/11/2025-6/11/2026    Copay: $0     Is patient eligible to fill with [facility]? Yes    Next Steps: The prescription has been approved.   I will notify the patient and offer our delivery service from   [facility]    Meryl COY  Rx Coodinator

## 2025-06-12 ENCOUNTER — PHARMACY VISIT (OUTPATIENT)
Dept: PHARMACY | Facility: MEDICAL CENTER | Age: 40
End: 2025-06-12
Payer: MEDICARE

## 2025-08-11 ENCOUNTER — SPECIALTY PHARMACY (OUTPATIENT)
Dept: NEUROLOGY | Facility: MEDICAL CENTER | Age: 40
End: 2025-08-11
Payer: COMMERCIAL

## (undated) DEVICE — LACTATED RINGERS INJ 1000 ML - (14EA/CA 60CA/PF)

## (undated) DEVICE — TROCAR 5X100 BLADED Z-THREAD - KII (6/BX)

## (undated) DEVICE — TROCAR Z THREAD 11 X 100 - BLADED (6/BX)

## (undated) DEVICE — SPONGE GAUZESTER. 2X2 4-PL - (2/PK 50PK/BX 30BX/CS)

## (undated) DEVICE — SOD. CHL. INJ. 0.9% 250 ML - (36/CA 50CA/PF)

## (undated) DEVICE — PROTECTOR ULNA NERVE - (36PR/CA)

## (undated) DEVICE — GLOVE BIOGEL PI ORTHO SZ 7 PF LF (40PR/BX)

## (undated) DEVICE — KIT ROOM DECONTAMINATION

## (undated) DEVICE — SLEEVE, VASO, THIGH, MED

## (undated) DEVICE — ELECTRODE 850 FOAM ADHESIVE - HYDROGEL RADIOTRNSPRNT (50/PK)

## (undated) DEVICE — SUTURE 0 VICRYL PLUS CT-2 - 27 INCH (36/BX)

## (undated) DEVICE — TUBING CLEARLINK DUO-VENT - C-FLO (48EA/CA)

## (undated) DEVICE — KIT ANESTHESIA W/CIRCUIT & 3/LT BAG W/FILTER (20EA/CA)

## (undated) DEVICE — SET SUCTION/IRRIGATION WITH DISPOSABLE TIP (6/CA )PART #0250-070-520 IS A SUB

## (undated) DEVICE — TOWELS CLOTH SURGICAL - (4/PK 20PK/CA)

## (undated) DEVICE — DRESSING TRANSPARENT FILM TEGADERM 2.375 X 2.75"  (100EA/BX)"

## (undated) DEVICE — ELECTRODE DUAL RETURN W/ CORD - (50/PK)

## (undated) DEVICE — SUCTION INSTRUMENT YANKAUER BULBOUS TIP W/O VENT (50EA/CA)

## (undated) DEVICE — SENSOR SPO2 NEO LNCS ADHESIVE (20/BX) SEE USER NOTES

## (undated) DEVICE — PACK LAPAROSCOPY - (1/CA)

## (undated) DEVICE — SUTURE 4-0 MONOCRYL PLUS PS-2 - 27 INCH (36/BX)

## (undated) DEVICE — MASK ANESTHESIA ADULT  - (100/CA)

## (undated) DEVICE — DETERGENT RENUZYME PLUS 10 OZ PACKET (50/BX)

## (undated) DEVICE — CANISTER SUCTION 3000ML MECHANICAL FILTER AUTO SHUTOFF MEDI-VAC NONSTERILE LF DISP  (40EA/CA)

## (undated) DEVICE — GOWN WARMING STANDARD FLEX - (30/CA)

## (undated) DEVICE — SET LEADWIRE 5 LEAD BEDSIDE DISPOSABLE ECG (1SET OF 5/EA)

## (undated) DEVICE — NEPTUNE 4 PORT MANIFOLD - (20/PK)

## (undated) DEVICE — GLOVE BIOGEL PI ORTHO SZ 6 1/2 SURGICAL PF LF (40PR/BX)

## (undated) DEVICE — NEEDLE INSUFFLATION FOR STEP - (12/BX)

## (undated) DEVICE — SET EXTENSION WITH 2 PORTS (48EA/CA) ***PART #2C8610 IS A SUBSTITUTE*****

## (undated) DEVICE — SUTURE GENERAL

## (undated) DEVICE — PAD SANITARY 11IN MAXI IND WRAPPED  (12EA/PK 24PK/CA)

## (undated) DEVICE — SODIUM CHL IRRIGATION 0.9% 1000ML (12EA/CA)

## (undated) DEVICE — HEAD HOLDER JUNIOR/ADULT